# Patient Record
Sex: MALE | Race: WHITE | Employment: OTHER | URBAN - METROPOLITAN AREA
[De-identification: names, ages, dates, MRNs, and addresses within clinical notes are randomized per-mention and may not be internally consistent; named-entity substitution may affect disease eponyms.]

---

## 2021-01-01 ENCOUNTER — APPOINTMENT (OUTPATIENT)
Dept: GENERAL RADIOLOGY | Age: 76
DRG: 870 | End: 2021-01-01
Payer: MEDICARE

## 2021-01-01 ENCOUNTER — HOSPITAL ENCOUNTER (INPATIENT)
Age: 76
LOS: 18 days | DRG: 870 | End: 2021-01-26
Attending: EMERGENCY MEDICINE | Admitting: INTERNAL MEDICINE
Payer: MEDICARE

## 2021-01-01 ENCOUNTER — ANESTHESIA (OUTPATIENT)
Dept: INPATIENT UNIT | Age: 76
DRG: 870 | End: 2021-01-01
Payer: MEDICARE

## 2021-01-01 ENCOUNTER — APPOINTMENT (OUTPATIENT)
Dept: ULTRASOUND IMAGING | Age: 76
DRG: 870 | End: 2021-01-01
Payer: MEDICARE

## 2021-01-01 ENCOUNTER — APPOINTMENT (OUTPATIENT)
Dept: INTERVENTIONAL RADIOLOGY/VASCULAR | Age: 76
DRG: 870 | End: 2021-01-01
Payer: MEDICARE

## 2021-01-01 ENCOUNTER — APPOINTMENT (OUTPATIENT)
Dept: CT IMAGING | Age: 76
DRG: 870 | End: 2021-01-01
Payer: MEDICARE

## 2021-01-01 ENCOUNTER — ANESTHESIA EVENT (OUTPATIENT)
Dept: INPATIENT UNIT | Age: 76
DRG: 870 | End: 2021-01-01
Payer: MEDICARE

## 2021-01-01 VITALS
TEMPERATURE: 98.2 F | RESPIRATION RATE: 24 BRPM | HEIGHT: 72 IN | WEIGHT: 286.6 LBS | BODY MASS INDEX: 38.82 KG/M2 | OXYGEN SATURATION: 96 % | HEART RATE: 123 BPM | DIASTOLIC BLOOD PRESSURE: 47 MMHG | SYSTOLIC BLOOD PRESSURE: 75 MMHG

## 2021-01-01 DIAGNOSIS — U07.1 COVID-19: ICD-10-CM

## 2021-01-01 DIAGNOSIS — Z79.4 TYPE 2 DIABETES MELLITUS WITH HYPERGLYCEMIA, WITH LONG-TERM CURRENT USE OF INSULIN (HCC): ICD-10-CM

## 2021-01-01 DIAGNOSIS — E11.65 TYPE 2 DIABETES MELLITUS WITH HYPERGLYCEMIA, WITH LONG-TERM CURRENT USE OF INSULIN (HCC): ICD-10-CM

## 2021-01-01 DIAGNOSIS — N17.9 AKI (ACUTE KIDNEY INJURY) (HCC): ICD-10-CM

## 2021-01-01 DIAGNOSIS — R06.00 DYSPNEA, UNSPECIFIED TYPE: Primary | ICD-10-CM

## 2021-01-01 DIAGNOSIS — J96.01 ACUTE RESPIRATORY FAILURE WITH HYPOXIA (HCC): ICD-10-CM

## 2021-01-01 DIAGNOSIS — R79.89 ELEVATED D-DIMER: ICD-10-CM

## 2021-01-01 LAB
1,3 BETA-D-GLUCAN INTERP: NEGATIVE
1,3 BETA-D-GLUCAN: <31 PG/ML
ABO/RH: NORMAL
ALBUMIN SERPL-MCNC: 1.8 GM/DL (ref 3.4–5)
ALBUMIN SERPL-MCNC: 1.9 GM/DL (ref 3.4–5)
ALBUMIN SERPL-MCNC: 2.1 GM/DL (ref 3.4–5)
ALBUMIN SERPL-MCNC: 2.1 GM/DL (ref 3.4–5)
ALBUMIN SERPL-MCNC: 2.3 GM/DL
ALBUMIN SERPL-MCNC: 2.3 GM/DL (ref 3.4–5)
ALBUMIN SERPL-MCNC: 2.3 GM/DL (ref 3.4–5)
ALBUMIN SERPL-MCNC: 2.4 GM/DL (ref 3.4–5)
ALBUMIN SERPL-MCNC: 2.4 GM/DL (ref 3.4–5)
ALBUMIN SERPL-MCNC: 2.5 GM/DL (ref 3.4–5)
ALBUMIN SERPL-MCNC: 2.5 GM/DL (ref 3.4–5)
ALBUMIN SERPL-MCNC: 2.6 GM/DL (ref 3.4–5)
ALBUMIN SERPL-MCNC: 2.8 GM/DL (ref 3.4–5)
ALBUMIN SERPL-MCNC: 3.2 GM/DL (ref 3.4–5)
ALBUMIN SERPL-MCNC: 3.3 GM/DL (ref 3.4–5)
ALBUMIN SERPL-MCNC: 3.3 GM/DL (ref 3.4–5)
ALBUMIN SERPL-MCNC: 3.4 GM/DL (ref 3.4–5)
ALP BLD-CCNC: 25 IU/L (ref 40–128)
ALP BLD-CCNC: 25 IU/L (ref 40–129)
ALP BLD-CCNC: 31 IU/L (ref 40–128)
ALP BLD-CCNC: 33 IU/L (ref 40–128)
ALP BLD-CCNC: 34 IU/L (ref 40–128)
ALP BLD-CCNC: 37 IU/L (ref 40–129)
ALP BLD-CCNC: 41 IU/L (ref 40–128)
ALP BLD-CCNC: 42 IU/L (ref 40–129)
ALP BLD-CCNC: 44 IU/L (ref 40–128)
ALP BLD-CCNC: 44 IU/L (ref 40–129)
ALP BLD-CCNC: 46 IU/L (ref 40–129)
ALP BLD-CCNC: 49 IU/L
ALP BLD-CCNC: 50 IU/L (ref 40–128)
ALP BLD-CCNC: 50 IU/L (ref 40–129)
ALP BLD-CCNC: 55 IU/L (ref 40–128)
ALP BLD-CCNC: 55 IU/L (ref 40–128)
ALT SERPL-CCNC: 13 U/L (ref 10–40)
ALT SERPL-CCNC: 13 U/L (ref 10–40)
ALT SERPL-CCNC: 14 U/L (ref 10–40)
ALT SERPL-CCNC: 15 U/L (ref 10–40)
ALT SERPL-CCNC: 15 U/L (ref 10–40)
ALT SERPL-CCNC: 18 U/L (ref 10–40)
ALT SERPL-CCNC: 18 U/L (ref 10–40)
ALT SERPL-CCNC: 19 U/L (ref 10–40)
ALT SERPL-CCNC: 23 U/L (ref 10–40)
ALT SERPL-CCNC: 24 U/L (ref 10–40)
ALT SERPL-CCNC: 29 U/L (ref 10–40)
ALT SERPL-CCNC: 30 U/L (ref 10–40)
ALT SERPL-CCNC: 30 U/L (ref 10–40)
ALT SERPL-CCNC: 31 U/L (ref 10–40)
ALT SERPL-CCNC: 897 U/L (ref 10–40)
ANION GAP SERPL CALCULATED.3IONS-SCNC: 10 MMOL/L (ref 4–16)
ANION GAP SERPL CALCULATED.3IONS-SCNC: 12 MMOL/L (ref 4–16)
ANION GAP SERPL CALCULATED.3IONS-SCNC: 14 MMOL/L (ref 4–16)
ANION GAP SERPL CALCULATED.3IONS-SCNC: 14 MMOL/L (ref 4–16)
ANION GAP SERPL CALCULATED.3IONS-SCNC: 15 MMOL/L (ref 4–16)
ANION GAP SERPL CALCULATED.3IONS-SCNC: 16 MMOL/L (ref 4–16)
ANION GAP SERPL CALCULATED.3IONS-SCNC: 17 MMOL/L (ref 4–16)
ANION GAP SERPL CALCULATED.3IONS-SCNC: 18 MMOL/L (ref 4–16)
ANION GAP SERPL CALCULATED.3IONS-SCNC: 21 MMOL/L (ref 4–16)
ANION GAP SERPL CALCULATED.3IONS-SCNC: 22 MMOL/L (ref 4–16)
ANION GAP SERPL CALCULATED.3IONS-SCNC: 9 MMOL/L (ref 4–16)
ANION GAP SERPL CALCULATED.3IONS-SCNC: 9 MMOL/L (ref 4–16)
ANTIBODY SCREEN: NEGATIVE
APTT: 122.6 SECONDS (ref 25.1–37.1)
APTT: 40.5 SECONDS (ref 25.1–37.1)
APTT: 47.4 SECONDS (ref 25.1–37.1)
APTT: 99.5 SECONDS (ref 25.1–37.1)
ASPERGILLUS GALACTO AG: NEGATIVE
ASPERGILLUS GALACTO INDEX: 0.04
AST SERPL-CCNC: 1882 IU/L (ref 15–37)
AST SERPL-CCNC: 25 IU/L (ref 15–37)
AST SERPL-CCNC: 26 IU/L (ref 15–37)
AST SERPL-CCNC: 27 IU/L (ref 15–37)
AST SERPL-CCNC: 28 IU/L (ref 15–37)
AST SERPL-CCNC: 29 IU/L (ref 15–37)
AST SERPL-CCNC: 30 IU/L (ref 15–37)
AST SERPL-CCNC: 31 IU/L (ref 15–37)
AST SERPL-CCNC: 32 IU/L (ref 15–37)
AST SERPL-CCNC: 34 IU/L (ref 15–37)
AST SERPL-CCNC: 37 IU/L (ref 15–37)
AST SERPL-CCNC: 44 IU/L (ref 15–37)
AST SERPL-CCNC: 51 IU/L (ref 15–37)
AST SERPL-CCNC: 53 IU/L (ref 15–37)
BACTERIA: ABNORMAL /HPF
BACTERIA: ABNORMAL /HPF
BACTERIA: NEGATIVE /HPF
BASE EXCESS MIXED: 1 (ref 0–1.2)
BASE EXCESS MIXED: 1 (ref 0–1.2)
BASE EXCESS MIXED: 3.4 (ref 0–1.2)
BASE EXCESS MIXED: 3.7 (ref 0–1.2)
BASE EXCESS MIXED: 5 (ref 0–1.2)
BASE EXCESS MIXED: 5.8 (ref 0–1.2)
BASE EXCESS MIXED: 7.2 (ref 0–1.2)
BASE EXCESS: 0 (ref 0–3.3)
BASE EXCESS: 1 (ref 0–3.3)
BASE EXCESS: 1 (ref 0–3.3)
BASE EXCESS: 11 (ref 0–3.3)
BASE EXCESS: 2 (ref 0–3.3)
BASE EXCESS: 3 (ref 0–3.3)
BASE EXCESS: 4 (ref 0–3.3)
BASE EXCESS: 4 (ref 0–3.3)
BASE EXCESS: 5 (ref 0–3.3)
BASE EXCESS: 7 (ref 0–3.3)
BASE EXCESS: 7 (ref 0–3.3)
BASE EXCESS: 8 (ref 0–3.3)
BASE EXCESS: ABNORMAL (ref 0–3.3)
BASOPHILS ABSOLUTE: 0 K/CU MM
BASOPHILS RELATIVE PERCENT: 0.3 % (ref 0–1)
BILIRUB SERPL-MCNC: 0.5 MG/DL (ref 0–1)
BILIRUB SERPL-MCNC: 0.6 MG/DL (ref 0–1)
BILIRUB SERPL-MCNC: 0.7 MG/DL (ref 0–1)
BILIRUB SERPL-MCNC: 0.7 MG/DL (ref 0–1)
BILIRUB SERPL-MCNC: 0.8 MG/DL (ref 0–1)
BILIRUB SERPL-MCNC: 0.8 MG/DL (ref 0–1)
BILIRUB SERPL-MCNC: 0.9 MG/DL (ref 0–1)
BILIRUB SERPL-MCNC: 1 MG/DL (ref 0–1)
BILIRUB SERPL-MCNC: 1 MG/DL (ref 0–1)
BILIRUB SERPL-MCNC: 1.1 MG/DL (ref 0–1)
BILIRUB SERPL-MCNC: 1.2 MG/DL (ref 0–1)
BILIRUB SERPL-MCNC: 1.4 MG/DL (ref 0–1)
BILIRUB SERPL-MCNC: 1.5 MG/DL (ref 0–1)
BILIRUBIN URINE: NEGATIVE MG/DL
BLOOD, URINE: ABNORMAL
BLOOD, URINE: NEGATIVE
BLOOD, URINE: NEGATIVE
BUN BLDV-MCNC: 104 MG/DL (ref 6–23)
BUN BLDV-MCNC: 110 MG/DL (ref 6–23)
BUN BLDV-MCNC: 29 MG/DL (ref 6–23)
BUN BLDV-MCNC: 29 MG/DL (ref 6–23)
BUN BLDV-MCNC: 34 MG/DL (ref 6–23)
BUN BLDV-MCNC: 34 MG/DL (ref 6–23)
BUN BLDV-MCNC: 36 MG/DL (ref 6–23)
BUN BLDV-MCNC: 36 MG/DL (ref 6–23)
BUN BLDV-MCNC: 37 MG/DL (ref 6–23)
BUN BLDV-MCNC: 39 MG/DL (ref 6–23)
BUN BLDV-MCNC: 40 MG/DL (ref 6–23)
BUN BLDV-MCNC: 40 MG/DL (ref 6–23)
BUN BLDV-MCNC: 41 MG/DL (ref 6–23)
BUN BLDV-MCNC: 45 MG/DL (ref 6–23)
BUN BLDV-MCNC: 46 MG/DL (ref 6–23)
BUN BLDV-MCNC: 56 MG/DL (ref 6–23)
BUN BLDV-MCNC: 69 MG/DL (ref 6–23)
BUN BLDV-MCNC: 74 MG/DL (ref 6–23)
BUN BLDV-MCNC: 77 MG/DL (ref 6–23)
BUN BLDV-MCNC: 82 MG/DL (ref 6–23)
BUN BLDV-MCNC: 86 MG/DL (ref 6–23)
BUN BLDV-MCNC: 91 MG/DL (ref 6–23)
C-REACTIVE PROTEIN, HIGH SENSITIVITY: 187 MG/L
C-REACTIVE PROTEIN, HIGH SENSITIVITY: 188.7 MG/L
C-REACTIVE PROTEIN, HIGH SENSITIVITY: 348.7 MG/L
C-REACTIVE PROTEIN, HIGH SENSITIVITY: 391.6 MG/L
CALCIUM IONIZED: 2.16 MG/DL (ref 4.48–5.28)
CALCIUM IONIZED: 2.44 MG/DL (ref 4.48–5.28)
CALCIUM IONIZED: 2.68 MG/DL (ref 4.48–5.28)
CALCIUM IONIZED: 2.72 MG/DL (ref 4.48–5.28)
CALCIUM IONIZED: 2.88 MG/DL (ref 4.48–5.28)
CALCIUM IONIZED: 2.92 MG/DL (ref 4.48–5.28)
CALCIUM IONIZED: 3.32 MG/DL (ref 4.48–5.28)
CALCIUM IONIZED: 3.36 MG/DL (ref 4.48–5.28)
CALCIUM IONIZED: 3.4 MG/DL (ref 4.48–5.28)
CALCIUM IONIZED: 3.68 MG/DL (ref 4.48–5.28)
CALCIUM IONIZED: 3.76 MG/DL (ref 4.48–5.28)
CALCIUM IONIZED: 3.8 MG/DL (ref 4.48–5.28)
CALCIUM IONIZED: 3.8 MG/DL (ref 4.48–5.28)
CALCIUM IONIZED: 3.96 MG/DL (ref 4.48–5.28)
CALCIUM IONIZED: 4.04 MG/DL (ref 4.48–5.28)
CALCIUM IONIZED: 4.16 MG/DL (ref 4.48–5.28)
CALCIUM IONIZED: 4.24 MG/DL (ref 4.48–5.28)
CALCIUM IONIZED: 4.36 MG/DL (ref 4.48–5.28)
CALCIUM IONIZED: 4.44 MG/DL (ref 4.48–5.28)
CALCIUM IONIZED: 4.44 MG/DL (ref 4.48–5.28)
CALCIUM IONIZED: 4.64 MG/DL (ref 4.48–5.28)
CALCIUM OXALATE CRYSTALS: ABNORMAL /HPF
CALCIUM SERPL-MCNC: 4.7 MG/DL (ref 8.3–10.6)
CALCIUM SERPL-MCNC: 6.6 MG/DL (ref 8.3–10.6)
CALCIUM SERPL-MCNC: 7.3 MG/DL (ref 8.3–10.6)
CALCIUM SERPL-MCNC: 7.3 MG/DL (ref 8.3–10.6)
CALCIUM SERPL-MCNC: 7.4 MG/DL (ref 8.3–10.6)
CALCIUM SERPL-MCNC: 7.5 MG/DL (ref 8.3–10.6)
CALCIUM SERPL-MCNC: 7.6 MG/DL (ref 8.3–10.6)
CALCIUM SERPL-MCNC: 7.7 MG/DL (ref 8.3–10.6)
CALCIUM SERPL-MCNC: 7.8 MG/DL (ref 8.3–10.6)
CALCIUM SERPL-MCNC: 8 MG/DL (ref 8.3–10.6)
CALCIUM SERPL-MCNC: 8.1 MG/DL (ref 8.3–10.6)
CALCIUM SERPL-MCNC: 8.1 MG/DL (ref 8.3–10.6)
CALCIUM SERPL-MCNC: 8.2 MG/DL (ref 8.3–10.6)
CALCIUM SERPL-MCNC: 8.4 MG/DL (ref 8.3–10.6)
CALCIUM SERPL-MCNC: 8.8 MG/DL (ref 8.3–10.6)
CALCIUM SERPL-MCNC: 8.9 MG/DL (ref 8.3–10.6)
CARBON MONOXIDE, BLOOD: 1.4 % (ref 0–5)
CARBON MONOXIDE, BLOOD: 1.5 % (ref 0–5)
CARBON MONOXIDE, BLOOD: 1.6 % (ref 0–5)
CARBON MONOXIDE, BLOOD: 1.7 % (ref 0–5)
CARBON MONOXIDE, BLOOD: 1.8 % (ref 0–5)
CARBON MONOXIDE, BLOOD: 1.9 % (ref 0–5)
CARBON MONOXIDE, BLOOD: 1.9 % (ref 0–5)
CARBON MONOXIDE, BLOOD: 2 % (ref 0–5)
CARBON MONOXIDE, BLOOD: 2.1 % (ref 0–5)
CARBON MONOXIDE, BLOOD: 2.2 % (ref 0–5)
CARBON MONOXIDE, BLOOD: 2.2 % (ref 0–5)
CAST TYPE: ABNORMAL /HPF
CAST TYPE: ABNORMAL /HPF
CHLORIDE BLD-SCNC: 100 MMOL/L (ref 99–110)
CHLORIDE BLD-SCNC: 100 MMOL/L (ref 99–110)
CHLORIDE BLD-SCNC: 101 MMOL/L (ref 99–110)
CHLORIDE BLD-SCNC: 102 MMOL/L (ref 99–110)
CHLORIDE BLD-SCNC: 79 MMOL/L (ref 99–110)
CHLORIDE BLD-SCNC: 80 MMOL/L (ref 99–110)
CHLORIDE BLD-SCNC: 81 MMOL/L (ref 99–110)
CHLORIDE BLD-SCNC: 86 MMOL/L (ref 99–110)
CHLORIDE BLD-SCNC: 87 MMOL/L (ref 99–110)
CHLORIDE BLD-SCNC: 87 MMOL/L (ref 99–110)
CHLORIDE BLD-SCNC: 88 MMOL/L (ref 99–110)
CHLORIDE BLD-SCNC: 88 MMOL/L (ref 99–110)
CHLORIDE BLD-SCNC: 90 MMOL/L (ref 99–110)
CHLORIDE BLD-SCNC: 91 MMOL/L (ref 99–110)
CHLORIDE BLD-SCNC: 94 MMOL/L (ref 99–110)
CHLORIDE BLD-SCNC: 95 MMOL/L (ref 99–110)
CHLORIDE BLD-SCNC: 96 MMOL/L (ref 99–110)
CHLORIDE BLD-SCNC: 97 MMOL/L (ref 99–110)
CHLORIDE BLD-SCNC: 98 MMOL/L (ref 99–110)
CHLORIDE BLD-SCNC: 99 MMOL/L (ref 99–110)
CHP ED QC CHECK: YES
CHP ED QC CHECK: YES
CLARITY: ABNORMAL
CLARITY: ABNORMAL
CLARITY: CLEAR
CO2 CONTENT: 19.5 MMOL/L (ref 19–24)
CO2 CONTENT: 21 MMOL/L (ref 19–24)
CO2 CONTENT: 21.8 MMOL/L (ref 19–24)
CO2 CONTENT: 22 MMOL/L (ref 19–24)
CO2 CONTENT: 22.6 MMOL/L (ref 19–24)
CO2 CONTENT: 23.5 MMOL/L (ref 19–24)
CO2 CONTENT: 24 MMOL/L (ref 19–24)
CO2 CONTENT: 25.1 MMOL/L (ref 19–24)
CO2 CONTENT: 25.6 MMOL/L (ref 19–24)
CO2 CONTENT: 25.7 MMOL/L (ref 19–24)
CO2 CONTENT: 26.4 MMOL/L (ref 19–24)
CO2 CONTENT: 26.8 MMOL/L (ref 19–24)
CO2 CONTENT: 27.4 MMOL/L (ref 19–24)
CO2 CONTENT: 28.9 MMOL/L (ref 19–24)
CO2 CONTENT: 29.7 MMOL/L (ref 19–24)
CO2 CONTENT: 29.8 MMOL/L (ref 19–24)
CO2 CONTENT: 30.2 MMOL/L (ref 19–24)
CO2 CONTENT: 30.3 MMOL/L (ref 19–24)
CO2 CONTENT: 31.1 MMOL/L (ref 19–24)
CO2 CONTENT: 32.5 MMOL/L (ref 19–24)
CO2 CONTENT: 32.6 MMOL/L (ref 19–24)
CO2: 17 MMOL/L (ref 21–32)
CO2: 18 MMOL/L (ref 21–32)
CO2: 18 MMOL/L (ref 21–32)
CO2: 19 MMOL/L (ref 21–32)
CO2: 20 MMOL/L (ref 21–32)
CO2: 20 MMOL/L (ref 21–32)
CO2: 21 MMOL/L (ref 21–32)
CO2: 21 MMOL/L (ref 21–32)
CO2: 22 MMOL/L (ref 21–32)
CO2: 22 MMOL/L (ref 21–32)
CO2: 23 MMOL/L (ref 21–32)
CO2: 24 MMOL/L (ref 21–32)
CO2: 24 MMOL/L (ref 21–32)
CO2: 25 MMOL/L (ref 21–32)
CO2: 27 MMOL/L (ref 21–32)
CO2: 28 MMOL/L (ref 21–32)
COLOR: ABNORMAL
COLOR: YELLOW
COLOR: YELLOW
COMMENT: ABNORMAL
COMPONENT: NORMAL
CREAT SERPL-MCNC: 1.1 MG/DL (ref 0.9–1.3)
CREAT SERPL-MCNC: 1.2 MG/DL (ref 0.9–1.3)
CREAT SERPL-MCNC: 1.3 MG/DL (ref 0.9–1.3)
CREAT SERPL-MCNC: 1.3 MG/DL (ref 0.9–1.3)
CREAT SERPL-MCNC: 1.4 MG/DL (ref 0.9–1.3)
CREAT SERPL-MCNC: 1.6 MG/DL (ref 0.9–1.3)
CREAT SERPL-MCNC: 1.7 MG/DL (ref 0.9–1.3)
CREAT SERPL-MCNC: 1.7 MG/DL (ref 0.9–1.3)
CREAT SERPL-MCNC: 1.8 MG/DL (ref 0.9–1.3)
CREAT SERPL-MCNC: 1.8 MG/DL (ref 0.9–1.3)
CREAT SERPL-MCNC: 2 MG/DL (ref 0.9–1.3)
CREAT SERPL-MCNC: 2.2 MG/DL (ref 0.9–1.3)
CREAT SERPL-MCNC: 2.4 MG/DL (ref 0.9–1.3)
CREAT SERPL-MCNC: 2.6 MG/DL (ref 0.9–1.3)
CREAT SERPL-MCNC: 2.7 MG/DL (ref 0.9–1.3)
CREAT SERPL-MCNC: 2.9 MG/DL (ref 0.9–1.3)
CREAT SERPL-MCNC: 2.9 MG/DL (ref 0.9–1.3)
CREAT SERPL-MCNC: 3.3 MG/DL (ref 0.9–1.3)
CREAT SERPL-MCNC: 3.4 MG/DL (ref 0.9–1.3)
CREAT SERPL-MCNC: 3.9 MG/DL (ref 0.9–1.3)
CREATININE URINE: 91 MG/DL (ref 39–259)
CRYSTAL TYPE: NEGATIVE /HPF
CRYSTAL TYPE: NEGATIVE /HPF
CULTURE: ABNORMAL
CULTURE: ABNORMAL
CULTURE: NORMAL
D DIMER: 1004 NG/ML(DDU)
D DIMER: 1009 NG/ML(DDU)
D DIMER: 1863 NG/ML(DDU)
D DIMER: 2.26 UG/ML (FEU)
D DIMER: 492 NG/ML(DDU)
D DIMER: 496 NG/ML(DDU)
D DIMER: 681 NG/ML(DDU)
D DIMER: 683 NG/ML(DDU)
D DIMER: 689 NG/ML(DDU)
D DIMER: 748 NG/ML(DDU)
D DIMER: 853 NG/ML(DDU)
D DIMER: 859 NG/ML(DDU)
D DIMER: 865 NG/ML(DDU)
D DIMER: 882 NG/ML(DDU)
D DIMER: 929 NG/ML(DDU)
D DIMER: 956 NG/ML(DDU)
D DIMER: 970 NG/ML(DDU)
D DIMER: >5250 NG/ML(DDU)
DIFFERENTIAL TYPE: ABNORMAL
DOSE AMOUNT: ABNORMAL
DOSE AMOUNT: NORMAL
DOSE TIME: ABNORMAL
DOSE TIME: NORMAL
EKG ATRIAL RATE: 20 BPM
EKG ATRIAL RATE: 394 BPM
EKG DIAGNOSIS: NORMAL
EKG DIAGNOSIS: NORMAL
EKG Q-T INTERVAL: 456 MS
EKG Q-T INTERVAL: 514 MS
EKG QRS DURATION: 80 MS
EKG QRS DURATION: 84 MS
EKG QTC CALCULATION (BAZETT): 454 MS
EKG QTC CALCULATION (BAZETT): 488 MS
EKG R AXIS: -1 DEGREES
EKG R AXIS: -6 DEGREES
EKG T AXIS: 18 DEGREES
EKG T AXIS: 30 DEGREES
EKG VENTRICULAR RATE: 47 BPM
EKG VENTRICULAR RATE: 69 BPM
EOSINOPHILS ABSOLUTE: 0 K/CU MM
EOSINOPHILS RELATIVE PERCENT: 0 % (ref 0–3)
EPITHELIAL CELLS, UA: 1 /HPF
EPITHELIAL CELLS, UA: 3 /HPF
FERRITIN: 2370 NG/ML (ref 30–400)
FIBRINOGEN LEVEL: 282 MG/DL (ref 196.9–442.1)
FIBRINOGEN LEVEL: 312 MG/DL (ref 196.9–442.1)
FIBRINOGEN LEVEL: 403 MG/DL (ref 196.9–442.1)
FIBRINOGEN LEVEL: 403 MG/DL (ref 196.9–442.1)
FIBRINOGEN LEVEL: 429 MG/DL (ref 196.9–442.1)
FIBRINOGEN LEVEL: 438 MG/DL (ref 196.9–442.1)
FIBRINOGEN LEVEL: 531 MG/DL (ref 196.9–442.1)
FIBRINOGEN LEVEL: 548 MG/DL (ref 196.9–442.1)
FIBRINOGEN LEVEL: 560 MG/DL (ref 196.9–442.1)
FIBRINOGEN LEVEL: 563 MG/DL (ref 196.9–442.1)
FIBRINOGEN LEVEL: 576 MG/DL (ref 196.9–442.1)
FIBRINOGEN LEVEL: 653 MG/DL (ref 196.9–442.1)
FIBRINOGEN LEVEL: 669 MG/DL (ref 196.9–442.1)
FIBRINOGEN LEVEL: 675 MG/DL (ref 196.9–442.1)
FIBRINOGEN LEVEL: 748 MG/DL (ref 196.9–442.1)
FIBRINOGEN LEVEL: 767 MG/DL (ref 196.9–442.1)
FIBRINOGEN LEVEL: 908 MG/DL (ref 196.9–442.1)
GFR AFRICAN AMERICAN: 18 ML/MIN/1.73M2
GFR AFRICAN AMERICAN: 21 ML/MIN/1.73M2
GFR AFRICAN AMERICAN: 22 ML/MIN/1.73M2
GFR AFRICAN AMERICAN: 26 ML/MIN/1.73M2
GFR AFRICAN AMERICAN: 26 ML/MIN/1.73M2
GFR AFRICAN AMERICAN: 28 ML/MIN/1.73M2
GFR AFRICAN AMERICAN: 29 ML/MIN/1.73M2
GFR AFRICAN AMERICAN: 32 ML/MIN/1.73M2
GFR AFRICAN AMERICAN: 35 ML/MIN/1.73M2
GFR AFRICAN AMERICAN: 40 ML/MIN/1.73M2
GFR AFRICAN AMERICAN: 45 ML/MIN/1.73M2
GFR AFRICAN AMERICAN: 45 ML/MIN/1.73M2
GFR AFRICAN AMERICAN: 48 ML/MIN/1.73M2
GFR AFRICAN AMERICAN: 48 ML/MIN/1.73M2
GFR AFRICAN AMERICAN: 51 ML/MIN/1.73M2
GFR AFRICAN AMERICAN: 53 ML/MIN/1.73M2
GFR AFRICAN AMERICAN: 60 ML/MIN/1.73M2
GFR AFRICAN AMERICAN: >60 ML/MIN/1.73M2
GFR NON-AFRICAN AMERICAN: 15 ML/MIN/1.73M2
GFR NON-AFRICAN AMERICAN: 18 ML/MIN/1.73M2
GFR NON-AFRICAN AMERICAN: 18 ML/MIN/1.73M2
GFR NON-AFRICAN AMERICAN: 21 ML/MIN/1.73M2
GFR NON-AFRICAN AMERICAN: 21 ML/MIN/1.73M2
GFR NON-AFRICAN AMERICAN: 23 ML/MIN/1.73M2
GFR NON-AFRICAN AMERICAN: 24 ML/MIN/1.73M2
GFR NON-AFRICAN AMERICAN: 26 ML/MIN/1.73M2
GFR NON-AFRICAN AMERICAN: 29 ML/MIN/1.73M2
GFR NON-AFRICAN AMERICAN: 33 ML/MIN/1.73M2
GFR NON-AFRICAN AMERICAN: 37 ML/MIN/1.73M2
GFR NON-AFRICAN AMERICAN: 37 ML/MIN/1.73M2
GFR NON-AFRICAN AMERICAN: 39 ML/MIN/1.73M2
GFR NON-AFRICAN AMERICAN: 39 ML/MIN/1.73M2
GFR NON-AFRICAN AMERICAN: 42 ML/MIN/1.73M2
GFR NON-AFRICAN AMERICAN: 44 ML/MIN/1.73M2
GFR NON-AFRICAN AMERICAN: 49 ML/MIN/1.73M2
GFR NON-AFRICAN AMERICAN: 54 ML/MIN/1.73M2
GFR NON-AFRICAN AMERICAN: 54 ML/MIN/1.73M2
GFR NON-AFRICAN AMERICAN: 59 ML/MIN/1.73M2
GFR NON-AFRICAN AMERICAN: >60 ML/MIN/1.73M2
GLUCOSE BLD-MCNC: 107 MG/DL (ref 70–99)
GLUCOSE BLD-MCNC: 115 MG/DL (ref 70–99)
GLUCOSE BLD-MCNC: 117 MG/DL (ref 70–99)
GLUCOSE BLD-MCNC: 120 MG/DL (ref 70–99)
GLUCOSE BLD-MCNC: 124 MG/DL (ref 70–99)
GLUCOSE BLD-MCNC: 128 MG/DL (ref 70–99)
GLUCOSE BLD-MCNC: 138 MG/DL (ref 70–99)
GLUCOSE BLD-MCNC: 138 MG/DL (ref 70–99)
GLUCOSE BLD-MCNC: 139 MG/DL (ref 70–99)
GLUCOSE BLD-MCNC: 141 MG/DL (ref 70–99)
GLUCOSE BLD-MCNC: 143 MG/DL (ref 70–99)
GLUCOSE BLD-MCNC: 144 MG/DL (ref 70–99)
GLUCOSE BLD-MCNC: 145 MG/DL (ref 70–99)
GLUCOSE BLD-MCNC: 147 MG/DL (ref 70–99)
GLUCOSE BLD-MCNC: 149 MG/DL (ref 70–99)
GLUCOSE BLD-MCNC: 150 MG/DL (ref 70–99)
GLUCOSE BLD-MCNC: 151 MG/DL (ref 70–99)
GLUCOSE BLD-MCNC: 151 MG/DL (ref 70–99)
GLUCOSE BLD-MCNC: 152 MG/DL (ref 70–99)
GLUCOSE BLD-MCNC: 157 MG/DL (ref 70–99)
GLUCOSE BLD-MCNC: 158 MG/DL (ref 70–99)
GLUCOSE BLD-MCNC: 159 MG/DL (ref 70–99)
GLUCOSE BLD-MCNC: 160 MG/DL (ref 70–99)
GLUCOSE BLD-MCNC: 160 MG/DL (ref 70–99)
GLUCOSE BLD-MCNC: 161 MG/DL (ref 70–99)
GLUCOSE BLD-MCNC: 162 MG/DL (ref 70–99)
GLUCOSE BLD-MCNC: 162 MG/DL (ref 70–99)
GLUCOSE BLD-MCNC: 163 MG/DL (ref 70–99)
GLUCOSE BLD-MCNC: 164 MG/DL (ref 70–99)
GLUCOSE BLD-MCNC: 165 MG/DL (ref 70–99)
GLUCOSE BLD-MCNC: 166 MG/DL (ref 70–99)
GLUCOSE BLD-MCNC: 167 MG/DL (ref 70–99)
GLUCOSE BLD-MCNC: 167 MG/DL (ref 70–99)
GLUCOSE BLD-MCNC: 168 MG/DL (ref 70–99)
GLUCOSE BLD-MCNC: 168 MG/DL (ref 70–99)
GLUCOSE BLD-MCNC: 171 MG/DL (ref 70–99)
GLUCOSE BLD-MCNC: 174 MG/DL (ref 70–99)
GLUCOSE BLD-MCNC: 177 MG/DL (ref 70–99)
GLUCOSE BLD-MCNC: 179 MG/DL (ref 70–99)
GLUCOSE BLD-MCNC: 179 MG/DL (ref 70–99)
GLUCOSE BLD-MCNC: 180 MG/DL (ref 70–99)
GLUCOSE BLD-MCNC: 180 MG/DL (ref 70–99)
GLUCOSE BLD-MCNC: 183 MG/DL (ref 70–99)
GLUCOSE BLD-MCNC: 185 MG/DL (ref 70–99)
GLUCOSE BLD-MCNC: 186 MG/DL (ref 70–99)
GLUCOSE BLD-MCNC: 186 MG/DL (ref 70–99)
GLUCOSE BLD-MCNC: 187 MG/DL (ref 70–99)
GLUCOSE BLD-MCNC: 187 MG/DL (ref 70–99)
GLUCOSE BLD-MCNC: 188 MG/DL (ref 70–99)
GLUCOSE BLD-MCNC: 189 MG/DL (ref 70–99)
GLUCOSE BLD-MCNC: 190 MG/DL (ref 70–99)
GLUCOSE BLD-MCNC: 190 MG/DL (ref 70–99)
GLUCOSE BLD-MCNC: 191 MG/DL (ref 70–99)
GLUCOSE BLD-MCNC: 194 MG/DL (ref 70–99)
GLUCOSE BLD-MCNC: 195 MG/DL (ref 70–99)
GLUCOSE BLD-MCNC: 196 MG/DL (ref 70–99)
GLUCOSE BLD-MCNC: 197 MG/DL (ref 70–99)
GLUCOSE BLD-MCNC: 198 MG/DL (ref 70–99)
GLUCOSE BLD-MCNC: 199 MG/DL (ref 70–99)
GLUCOSE BLD-MCNC: 202 MG/DL (ref 70–99)
GLUCOSE BLD-MCNC: 206 MG/DL (ref 70–99)
GLUCOSE BLD-MCNC: 206 MG/DL (ref 70–99)
GLUCOSE BLD-MCNC: 212 MG/DL (ref 70–99)
GLUCOSE BLD-MCNC: 213 MG/DL (ref 70–99)
GLUCOSE BLD-MCNC: 216 MG/DL (ref 70–99)
GLUCOSE BLD-MCNC: 219 MG/DL (ref 70–99)
GLUCOSE BLD-MCNC: 219 MG/DL (ref 70–99)
GLUCOSE BLD-MCNC: 220 MG/DL (ref 70–99)
GLUCOSE BLD-MCNC: 222 MG/DL (ref 70–99)
GLUCOSE BLD-MCNC: 222 MG/DL (ref 70–99)
GLUCOSE BLD-MCNC: 223 MG/DL (ref 70–99)
GLUCOSE BLD-MCNC: 230 MG/DL (ref 70–99)
GLUCOSE BLD-MCNC: 231 MG/DL (ref 70–99)
GLUCOSE BLD-MCNC: 231 MG/DL (ref 70–99)
GLUCOSE BLD-MCNC: 234 MG/DL (ref 70–99)
GLUCOSE BLD-MCNC: 236 MG/DL (ref 70–99)
GLUCOSE BLD-MCNC: 236 MG/DL (ref 70–99)
GLUCOSE BLD-MCNC: 238 MG/DL (ref 70–99)
GLUCOSE BLD-MCNC: 242 MG/DL (ref 70–99)
GLUCOSE BLD-MCNC: 244 MG/DL (ref 70–99)
GLUCOSE BLD-MCNC: 245 MG/DL (ref 70–99)
GLUCOSE BLD-MCNC: 250 MG/DL (ref 70–99)
GLUCOSE BLD-MCNC: 252 MG/DL (ref 70–99)
GLUCOSE BLD-MCNC: 252 MG/DL (ref 70–99)
GLUCOSE BLD-MCNC: 256 MG/DL (ref 70–99)
GLUCOSE BLD-MCNC: 269 MG/DL (ref 70–99)
GLUCOSE BLD-MCNC: 270 MG/DL (ref 70–99)
GLUCOSE BLD-MCNC: 273 MG/DL (ref 70–99)
GLUCOSE BLD-MCNC: 273 MG/DL (ref 70–99)
GLUCOSE BLD-MCNC: 274 MG/DL (ref 70–99)
GLUCOSE BLD-MCNC: 274 MG/DL (ref 70–99)
GLUCOSE BLD-MCNC: 275 MG/DL
GLUCOSE BLD-MCNC: 275 MG/DL (ref 70–99)
GLUCOSE BLD-MCNC: 278 MG/DL (ref 70–99)
GLUCOSE BLD-MCNC: 279 MG/DL
GLUCOSE BLD-MCNC: 279 MG/DL (ref 70–99)
GLUCOSE BLD-MCNC: 279 MG/DL (ref 70–99)
GLUCOSE BLD-MCNC: 284 MG/DL
GLUCOSE BLD-MCNC: 284 MG/DL (ref 70–99)
GLUCOSE BLD-MCNC: 286 MG/DL (ref 70–99)
GLUCOSE BLD-MCNC: 288 MG/DL (ref 70–99)
GLUCOSE BLD-MCNC: 293 MG/DL (ref 70–99)
GLUCOSE BLD-MCNC: 296 MG/DL (ref 70–99)
GLUCOSE BLD-MCNC: 300 MG/DL (ref 70–99)
GLUCOSE BLD-MCNC: 307 MG/DL (ref 70–99)
GLUCOSE BLD-MCNC: 315 MG/DL (ref 70–99)
GLUCOSE BLD-MCNC: 322 MG/DL (ref 70–99)
GLUCOSE BLD-MCNC: 322 MG/DL (ref 70–99)
GLUCOSE BLD-MCNC: 328 MG/DL (ref 70–99)
GLUCOSE BLD-MCNC: 334 MG/DL (ref 70–99)
GLUCOSE BLD-MCNC: 335 MG/DL (ref 70–99)
GLUCOSE BLD-MCNC: 350 MG/DL (ref 70–99)
GLUCOSE BLD-MCNC: 367 MG/DL (ref 70–99)
GLUCOSE BLD-MCNC: 367 MG/DL (ref 70–99)
GLUCOSE BLD-MCNC: 37 MG/DL (ref 70–99)
GLUCOSE BLD-MCNC: 370 MG/DL (ref 70–99)
GLUCOSE BLD-MCNC: 384 MG/DL (ref 70–99)
GLUCOSE BLD-MCNC: 391 MG/DL (ref 70–99)
GLUCOSE BLD-MCNC: 394 MG/DL (ref 70–99)
GLUCOSE BLD-MCNC: 402 MG/DL (ref 70–99)
GLUCOSE BLD-MCNC: 403 MG/DL (ref 70–99)
GLUCOSE BLD-MCNC: 473 MG/DL (ref 70–99)
GLUCOSE BLD-MCNC: 48 MG/DL (ref 70–99)
GLUCOSE BLD-MCNC: 538 MG/DL (ref 70–99)
GLUCOSE BLD-MCNC: 58 MG/DL (ref 70–99)
GLUCOSE, URINE: NEGATIVE MG/DL
GRAM SMEAR: ABNORMAL
GRAM SMEAR: NORMAL
GRANULAR CASTS: 4 /LPF
HCO3 ARTERIAL: 18.3 MMOL/L (ref 18–23)
HCO3 ARTERIAL: 19.7 MMOL/L (ref 18–23)
HCO3 ARTERIAL: 20.4 MMOL/L (ref 18–23)
HCO3 ARTERIAL: 20.6 MMOL/L (ref 18–23)
HCO3 ARTERIAL: 20.6 MMOL/L (ref 18–23)
HCO3 ARTERIAL: 22.1 MMOL/L (ref 18–23)
HCO3 ARTERIAL: 22.5 MMOL/L (ref 18–23)
HCO3 ARTERIAL: 23.1 MMOL/L (ref 18–23)
HCO3 ARTERIAL: 23.7 MMOL/L (ref 18–23)
HCO3 ARTERIAL: 23.9 MMOL/L (ref 18–23)
HCO3 ARTERIAL: 24 MMOL/L (ref 18–23)
HCO3 ARTERIAL: 25.1 MMOL/L (ref 18–23)
HCO3 ARTERIAL: 25.2 MMOL/L (ref 18–23)
HCO3 ARTERIAL: 25.4 MMOL/L (ref 18–23)
HCO3 ARTERIAL: 27.2 MMOL/L (ref 18–23)
HCO3 ARTERIAL: 27.4 MMOL/L (ref 18–23)
HCO3 ARTERIAL: 27.8 MMOL/L (ref 18–23)
HCO3 ARTERIAL: 28.5 MMOL/L (ref 18–23)
HCO3 ARTERIAL: 29.1 MMOL/L (ref 18–23)
HCO3 ARTERIAL: 29.6 MMOL/L (ref 18–23)
HCO3 ARTERIAL: 31.2 MMOL/L (ref 18–23)
HCO3 ARTERIAL: 31.2 MMOL/L (ref 18–23)
HCT VFR BLD CALC: 22.2 % (ref 42–52)
HCT VFR BLD CALC: 23.2 % (ref 42–52)
HCT VFR BLD CALC: 24 % (ref 42–52)
HCT VFR BLD CALC: 26.7 % (ref 42–52)
HCT VFR BLD CALC: 27.1 % (ref 42–52)
HCT VFR BLD CALC: 27.4 % (ref 42–52)
HCT VFR BLD CALC: 29.2 % (ref 42–52)
HCT VFR BLD CALC: 29.7 % (ref 42–52)
HCT VFR BLD CALC: 30.9 % (ref 42–52)
HCT VFR BLD CALC: 31.3 % (ref 42–52)
HCT VFR BLD CALC: 32.2 % (ref 42–52)
HCT VFR BLD CALC: 32.7 % (ref 42–52)
HCT VFR BLD CALC: 34 % (ref 42–52)
HCT VFR BLD CALC: 34.6 % (ref 42–52)
HCT VFR BLD CALC: 35.4 % (ref 42–52)
HCT VFR BLD CALC: 35.8 % (ref 42–52)
HCT VFR BLD CALC: 37 % (ref 42–52)
HCT VFR BLD CALC: 37.2 % (ref 42–52)
HCT VFR BLD CALC: 37.2 % (ref 42–52)
HEMOGLOBIN: 10.2 GM/DL (ref 13.5–18)
HEMOGLOBIN: 10.3 GM/DL (ref 13.5–18)
HEMOGLOBIN: 10.3 GM/DL (ref 13.5–18)
HEMOGLOBIN: 10.6 GM/DL (ref 13.5–18)
HEMOGLOBIN: 10.7 GM/DL (ref 13.5–18)
HEMOGLOBIN: 10.8 GM/DL (ref 13.5–18)
HEMOGLOBIN: 11.3 GM/DL (ref 13.5–18)
HEMOGLOBIN: 11.7 GM/DL (ref 13.5–18)
HEMOGLOBIN: 11.8 GM/DL (ref 13.5–18)
HEMOGLOBIN: 11.9 GM/DL (ref 13.5–18)
HEMOGLOBIN: 12 GM/DL (ref 13.5–18)
HEMOGLOBIN: 12.3 GM/DL (ref 13.5–18)
HEMOGLOBIN: 12.5 GM/DL (ref 13.5–18)
HEMOGLOBIN: 7 GM/DL (ref 13.5–18)
HEMOGLOBIN: 7.4 GM/DL (ref 13.5–18)
HEMOGLOBIN: 7.7 GM/DL (ref 13.5–18)
HEMOGLOBIN: 8.1 GM/DL (ref 13.5–18)
HEMOGLOBIN: 9 GM/DL (ref 13.5–18)
HEMOGLOBIN: 9 GM/DL (ref 13.5–18)
HIGH SENSITIVE C-REACTIVE PROTEIN: 104.6 MG/L
HIGH SENSITIVE C-REACTIVE PROTEIN: 148.7 MG/L
HIGH SENSITIVE C-REACTIVE PROTEIN: 211.8 MG/L
HIGH SENSITIVE C-REACTIVE PROTEIN: 83.7 MG/L
HIGH SENSITIVE C-REACTIVE PROTEIN: >300 MG/L
HIV SCREEN: NON REACTIVE
IMMATURE NEUTROPHIL %: 1.1 % (ref 0–0.43)
INR BLD: 1.02 INDEX
IONIZED CA: 0.54 MMOL/L (ref 1.12–1.32)
IONIZED CA: 0.61 MMOL/L (ref 1.12–1.32)
IONIZED CA: 0.67 MMOL/L (ref 1.12–1.32)
IONIZED CA: 0.68 MMOL/L (ref 1.12–1.32)
IONIZED CA: 0.72 MMOL/L (ref 1.12–1.32)
IONIZED CA: 0.73 MMOL/L (ref 1.12–1.32)
IONIZED CA: 0.83 MMOL/L (ref 1.12–1.32)
IONIZED CA: 0.84 MMOL/L (ref 1.12–1.32)
IONIZED CA: 0.85 MMOL/L (ref 1.12–1.32)
IONIZED CA: 0.92 MMOL/L (ref 1.12–1.32)
IONIZED CA: 0.94 MMOL/L (ref 1.12–1.32)
IONIZED CA: 0.95 MMOL/L (ref 1.12–1.32)
IONIZED CA: 0.95 MMOL/L (ref 1.12–1.32)
IONIZED CA: 0.99 MMOL/L (ref 1.12–1.32)
IONIZED CA: 1.01 MMOL/L (ref 1.12–1.32)
IONIZED CA: 1.04 MMOL/L (ref 1.12–1.32)
IONIZED CA: 1.06 MMOL/L (ref 1.12–1.32)
IONIZED CA: 1.09 MMOL/L (ref 1.12–1.32)
IONIZED CA: 1.11 MMOL/L (ref 1.12–1.32)
IONIZED CA: 1.11 MMOL/L (ref 1.12–1.32)
IONIZED CA: 1.16 MMOL/L (ref 1.12–1.32)
KETONES, URINE: 15 MG/DL
KETONES, URINE: 5 MG/DL
KETONES, URINE: ABNORMAL MG/DL
LACTATE: 1 MMOL/L (ref 0.4–2)
LACTATE: 1.5 MMOL/L (ref 0.4–2)
LACTATE: 2.7 MMOL/L (ref 0.4–2)
LACTIC ACID, SEPSIS: 1.3 MMOL/L (ref 0.5–1.9)
LEUKOCYTE ESTERASE, URINE: NEGATIVE
LYMPHOCYTES ABSOLUTE: 0.7 K/CU MM
LYMPHOCYTES RELATIVE PERCENT: 17.9 % (ref 24–44)
Lab: 2
Lab: ABNORMAL
Lab: ABNORMAL
Lab: NORMAL
MAGNESIUM: 2 MG/DL (ref 1.8–2.4)
MAGNESIUM: 2.1 MG/DL (ref 1.8–2.4)
MAGNESIUM: 2.2 MG/DL (ref 1.8–2.4)
MAGNESIUM: 2.2 MG/DL (ref 1.8–2.4)
MAGNESIUM: 2.3 MG/DL (ref 1.8–2.4)
MAGNESIUM: 2.4 MG/DL (ref 1.8–2.4)
MAGNESIUM: 2.5 MG/DL (ref 1.8–2.4)
MAGNESIUM: 2.6 MG/DL (ref 1.8–2.4)
MAGNESIUM: 2.7 MG/DL (ref 1.8–2.4)
MAGNESIUM: 2.9 MG/DL (ref 1.8–2.4)
MAGNESIUM: 2.9 MG/DL (ref 1.8–2.4)
MAGNESIUM: 3 MG/DL (ref 1.8–2.4)
MCH RBC QN AUTO: 30.8 PG (ref 27–31)
MCH RBC QN AUTO: 30.9 PG (ref 27–31)
MCH RBC QN AUTO: 31 PG (ref 27–31)
MCH RBC QN AUTO: 31.1 PG (ref 27–31)
MCH RBC QN AUTO: 31.3 PG (ref 27–31)
MCH RBC QN AUTO: 31.4 PG (ref 27–31)
MCH RBC QN AUTO: 31.5 PG (ref 27–31)
MCH RBC QN AUTO: 31.6 PG (ref 27–31)
MCH RBC QN AUTO: 31.7 PG (ref 27–31)
MCH RBC QN AUTO: 31.7 PG (ref 27–31)
MCH RBC QN AUTO: 31.8 PG (ref 27–31)
MCH RBC QN AUTO: 31.9 PG (ref 27–31)
MCH RBC QN AUTO: 31.9 PG (ref 27–31)
MCHC RBC AUTO-ENTMCNC: 29.6 % (ref 32–36)
MCHC RBC AUTO-ENTMCNC: 30.8 % (ref 32–36)
MCHC RBC AUTO-ENTMCNC: 31.5 % (ref 32–36)
MCHC RBC AUTO-ENTMCNC: 32.3 % (ref 32–36)
MCHC RBC AUTO-ENTMCNC: 32.7 % (ref 32–36)
MCHC RBC AUTO-ENTMCNC: 33 % (ref 32–36)
MCHC RBC AUTO-ENTMCNC: 33.2 % (ref 32–36)
MCHC RBC AUTO-ENTMCNC: 33.3 % (ref 32–36)
MCHC RBC AUTO-ENTMCNC: 33.3 % (ref 32–36)
MCHC RBC AUTO-ENTMCNC: 33.6 % (ref 32–36)
MCHC RBC AUTO-ENTMCNC: 33.7 % (ref 32–36)
MCHC RBC AUTO-ENTMCNC: 33.9 % (ref 32–36)
MCHC RBC AUTO-ENTMCNC: 34.7 % (ref 32–36)
MCHC RBC AUTO-ENTMCNC: 34.9 % (ref 32–36)
MCV RBC AUTO: 101.7 FL (ref 78–100)
MCV RBC AUTO: 104.6 FL (ref 78–100)
MCV RBC AUTO: 90.7 FL (ref 78–100)
MCV RBC AUTO: 91.1 FL (ref 78–100)
MCV RBC AUTO: 92.5 FL (ref 78–100)
MCV RBC AUTO: 93.1 FL (ref 78–100)
MCV RBC AUTO: 93.2 FL (ref 78–100)
MCV RBC AUTO: 93.5 FL (ref 78–100)
MCV RBC AUTO: 93.6 FL (ref 78–100)
MCV RBC AUTO: 94.1 FL (ref 78–100)
MCV RBC AUTO: 94.3 FL (ref 78–100)
MCV RBC AUTO: 94.6 FL (ref 78–100)
MCV RBC AUTO: 94.7 FL (ref 78–100)
MCV RBC AUTO: 95.3 FL (ref 78–100)
MCV RBC AUTO: 95.9 FL (ref 78–100)
MCV RBC AUTO: 95.9 FL (ref 78–100)
MCV RBC AUTO: 96.4 FL (ref 78–100)
MCV RBC AUTO: 97.8 FL (ref 78–100)
METHEMOGLOBIN ARTERIAL: 1 %
METHEMOGLOBIN ARTERIAL: 1 %
METHEMOGLOBIN ARTERIAL: 1.1 %
METHEMOGLOBIN ARTERIAL: 1.2 %
METHEMOGLOBIN ARTERIAL: 1.3 %
METHEMOGLOBIN ARTERIAL: 1.3 %
METHEMOGLOBIN ARTERIAL: 1.4 %
METHEMOGLOBIN ARTERIAL: 1.5 %
METHEMOGLOBIN ARTERIAL: 1.7 %
METHEMOGLOBIN ARTERIAL: 1.9 %
METHEMOGLOBIN ARTERIAL: 2 %
MONOCYTES ABSOLUTE: 0.3 K/CU MM
MONOCYTES RELATIVE PERCENT: 8.2 % (ref 0–4)
MUCUS: ABNORMAL HPF
NITRITE URINE, QUANTITATIVE: NEGATIVE
O2 SATURATION: 58.9 % (ref 96–97)
O2 SATURATION: 81 % (ref 96–97)
O2 SATURATION: 86.2 % (ref 96–97)
O2 SATURATION: 87.5 % (ref 96–97)
O2 SATURATION: 88.1 % (ref 96–97)
O2 SATURATION: 88.8 % (ref 96–97)
O2 SATURATION: 88.9 % (ref 96–97)
O2 SATURATION: 89.2 % (ref 96–97)
O2 SATURATION: 90.1 % (ref 96–97)
O2 SATURATION: 90.5 % (ref 96–97)
O2 SATURATION: 90.8 % (ref 96–97)
O2 SATURATION: 91.1 % (ref 96–97)
O2 SATURATION: 92.4 % (ref 96–97)
O2 SATURATION: 92.8 % (ref 96–97)
O2 SATURATION: 93.7 % (ref 96–97)
O2 SATURATION: 93.9 % (ref 96–97)
O2 SATURATION: 94.2 % (ref 96–97)
O2 SATURATION: 94.2 % (ref 96–97)
O2 SATURATION: 94.6 % (ref 96–97)
O2 SATURATION: 95.2 % (ref 96–97)
O2 SATURATION: 96.2 % (ref 96–97)
O2 SATURATION: 96.7 % (ref 96–97)
PCO2 ARTERIAL: 35.7 MMHG (ref 32–45)
PCO2 ARTERIAL: 38 MMHG (ref 32–45)
PCO2 ARTERIAL: 39 MMHG (ref 32–45)
PCO2 ARTERIAL: 39 MMHG (ref 32–45)
PCO2 ARTERIAL: 40 MMHG (ref 32–45)
PCO2 ARTERIAL: 41 MMHG (ref 32–45)
PCO2 ARTERIAL: 41 MMHG (ref 32–45)
PCO2 ARTERIAL: 43 MMHG (ref 32–45)
PCO2 ARTERIAL: 44 MMHG (ref 32–45)
PCO2 ARTERIAL: 46 MMHG (ref 32–45)
PCO2 ARTERIAL: 47 MMHG (ref 32–45)
PCO2 ARTERIAL: 47 MMHG (ref 32–45)
PCO2 ARTERIAL: 49 MMHG (ref 32–45)
PCO2 ARTERIAL: 50 MMHG (ref 32–45)
PCO2 ARTERIAL: 54 MMHG (ref 32–45)
PCO2 ARTERIAL: 56 MMHG (ref 32–45)
PCO2 ARTERIAL: 56 MMHG (ref 32–45)
PCO2 ARTERIAL: 57 MMHG (ref 32–45)
PCO2 ARTERIAL: 65 MMHG (ref 32–45)
PCO2 ARTERIAL: 72 MMHG (ref 32–45)
PCO2 ARTERIAL: 75 MMHG (ref 32–45)
PCO2 ARTERIAL: 78 MMHG (ref 32–45)
PDW BLD-RTO: 13.2 % (ref 11.7–14.9)
PDW BLD-RTO: 13.3 % (ref 11.7–14.9)
PDW BLD-RTO: 13.4 % (ref 11.7–14.9)
PDW BLD-RTO: 13.5 % (ref 11.7–14.9)
PDW BLD-RTO: 13.7 % (ref 11.7–14.9)
PDW BLD-RTO: 13.8 % (ref 11.7–14.9)
PDW BLD-RTO: 14.3 % (ref 11.7–14.9)
PDW BLD-RTO: 14.6 % (ref 11.7–14.9)
PDW BLD-RTO: 14.7 % (ref 11.7–14.9)
PDW BLD-RTO: 15.2 % (ref 11.7–14.9)
PDW BLD-RTO: 15.6 % (ref 11.7–14.9)
PDW BLD-RTO: 15.7 % (ref 11.7–14.9)
PH BLOOD: 7.06 (ref 7.34–7.45)
PH BLOOD: 7.16 (ref 7.34–7.45)
PH BLOOD: 7.17 (ref 7.34–7.45)
PH BLOOD: 7.2 (ref 7.34–7.45)
PH BLOOD: 7.23 (ref 7.34–7.45)
PH BLOOD: 7.24 (ref 7.34–7.45)
PH BLOOD: 7.25 (ref 7.34–7.45)
PH BLOOD: 7.26 (ref 7.34–7.45)
PH BLOOD: 7.27 (ref 7.34–7.45)
PH BLOOD: 7.28 (ref 7.34–7.45)
PH BLOOD: 7.29 (ref 7.34–7.45)
PH BLOOD: 7.29 (ref 7.34–7.45)
PH BLOOD: 7.31 (ref 7.34–7.45)
PH BLOOD: 7.33 (ref 7.34–7.45)
PH BLOOD: 7.34 (ref 7.34–7.45)
PH BLOOD: 7.35 (ref 7.34–7.45)
PH BLOOD: 7.35 (ref 7.34–7.45)
PH BLOOD: 7.38 (ref 7.34–7.45)
PH BLOOD: 7.42 (ref 7.34–7.45)
PH BLOOD: 7.43 (ref 7.34–7.45)
PH BLOOD: 7.47 (ref 7.34–7.45)
PH BLOOD: 7.49 (ref 7.34–7.45)
PH, URINE: 5 (ref 5–8)
PHOSPHORUS: 10.4 MG/DL (ref 2.5–4.9)
PHOSPHORUS: 3.1 MG/DL (ref 2.5–4.9)
PHOSPHORUS: 3.7 MG/DL (ref 2.5–4.9)
PHOSPHORUS: 3.8 MG/DL (ref 2.5–4.9)
PHOSPHORUS: 3.9 MG/DL (ref 2.5–4.9)
PHOSPHORUS: 4.5 MG/DL (ref 2.5–4.9)
PHOSPHORUS: 4.5 MG/DL (ref 2.5–4.9)
PHOSPHORUS: 4.8 MG/DL (ref 2.5–4.9)
PHOSPHORUS: 4.8 MG/DL (ref 2.5–4.9)
PHOSPHORUS: 6.1 MG/DL (ref 2.5–4.9)
PHOSPHORUS: 6.7 MG/DL (ref 2.5–4.9)
PHOSPHORUS: 8.9 MG/DL (ref 2.5–4.9)
PHOSPHORUS: 9 MG/DL (ref 2.5–4.9)
PLATELET # BLD: 118 K/CU MM (ref 140–440)
PLATELET # BLD: 161 K/CU MM (ref 140–440)
PLATELET # BLD: 180 K/CU MM (ref 140–440)
PLATELET # BLD: 202 K/CU MM (ref 140–440)
PLATELET # BLD: 211 K/CU MM (ref 140–440)
PLATELET # BLD: 221 K/CU MM (ref 140–440)
PLATELET # BLD: 228 K/CU MM (ref 140–440)
PLATELET # BLD: 245 K/CU MM (ref 140–440)
PLATELET # BLD: 251 K/CU MM (ref 140–440)
PLATELET # BLD: 254 K/CU MM (ref 140–440)
PLATELET # BLD: 262 K/CU MM (ref 140–440)
PLATELET # BLD: 271 K/CU MM (ref 140–440)
PLATELET # BLD: 282 K/CU MM (ref 140–440)
PLATELET # BLD: 286 K/CU MM (ref 140–440)
PLATELET # BLD: 288 K/CU MM (ref 140–440)
PLATELET # BLD: 291 K/CU MM (ref 140–440)
PLATELET # BLD: 310 K/CU MM (ref 140–440)
PLATELET # BLD: 321 K/CU MM (ref 140–440)
PMV BLD AUTO: 10 FL (ref 7.5–11.1)
PMV BLD AUTO: 10 FL (ref 7.5–11.1)
PMV BLD AUTO: 10.2 FL (ref 7.5–11.1)
PMV BLD AUTO: 10.3 FL (ref 7.5–11.1)
PMV BLD AUTO: 10.3 FL (ref 7.5–11.1)
PMV BLD AUTO: 10.5 FL (ref 7.5–11.1)
PMV BLD AUTO: 10.8 FL (ref 7.5–11.1)
PMV BLD AUTO: 10.9 FL (ref 7.5–11.1)
PMV BLD AUTO: 11 FL (ref 7.5–11.1)
PMV BLD AUTO: 11 FL (ref 7.5–11.1)
PMV BLD AUTO: 11.4 FL (ref 7.5–11.1)
PMV BLD AUTO: 11.6 FL (ref 7.5–11.1)
PMV BLD AUTO: 11.8 FL (ref 7.5–11.1)
PMV BLD AUTO: 11.9 FL (ref 7.5–11.1)
PMV BLD AUTO: 9.8 FL (ref 7.5–11.1)
PMV BLD AUTO: 9.8 FL (ref 7.5–11.1)
PMV BLD AUTO: 9.9 FL (ref 7.5–11.1)
PMV BLD AUTO: 9.9 FL (ref 7.5–11.1)
PO2 ARTERIAL: 113 MMHG (ref 75–100)
PO2 ARTERIAL: 146 MMHG (ref 75–100)
PO2 ARTERIAL: 33 MMHG (ref 75–100)
PO2 ARTERIAL: 49 MMHG (ref 75–100)
PO2 ARTERIAL: 51 MMHG (ref 75–100)
PO2 ARTERIAL: 53.4 MMHG (ref 75–100)
PO2 ARTERIAL: 58 MMHG (ref 75–100)
PO2 ARTERIAL: 59 MMHG (ref 75–100)
PO2 ARTERIAL: 59 MMHG (ref 75–100)
PO2 ARTERIAL: 61 MMHG (ref 75–100)
PO2 ARTERIAL: 64 MMHG (ref 75–100)
PO2 ARTERIAL: 66 MMHG (ref 75–100)
PO2 ARTERIAL: 67 MMHG (ref 75–100)
PO2 ARTERIAL: 67 MMHG (ref 75–100)
PO2 ARTERIAL: 72 MMHG (ref 75–100)
PO2 ARTERIAL: 72 MMHG (ref 75–100)
PO2 ARTERIAL: 73 MMHG (ref 75–100)
PO2 ARTERIAL: 74 MMHG (ref 75–100)
PO2 ARTERIAL: 80 MMHG (ref 75–100)
PO2 ARTERIAL: 82 MMHG (ref 75–100)
PO2 ARTERIAL: 84 MMHG (ref 75–100)
PO2 ARTERIAL: 84 MMHG (ref 75–100)
PO2 ARTERIAL: 86 MMHG (ref 75–100)
PO2 ARTERIAL: 86 MMHG (ref 75–100)
POC CALCIUM: 1.09 MMOL/L (ref 1.12–1.32)
POC CHLORIDE: 104 MMOL/L (ref 98–109)
POC CREATININE: 1.6 MG/DL (ref 0.9–1.3)
POTASSIUM SERPL-SCNC: 3.9 MMOL/L (ref 3.5–5.1)
POTASSIUM SERPL-SCNC: 4.1 MMOL/L (ref 3.5–5.1)
POTASSIUM SERPL-SCNC: 4.4 MMOL/L (ref 3.5–4.5)
POTASSIUM SERPL-SCNC: 4.4 MMOL/L (ref 3.5–5.1)
POTASSIUM SERPL-SCNC: 4.5 MMOL/L (ref 3.5–5.1)
POTASSIUM SERPL-SCNC: 4.6 MMOL/L (ref 3.5–5.1)
POTASSIUM SERPL-SCNC: 4.7 MMOL/L (ref 3.5–5.1)
POTASSIUM SERPL-SCNC: 4.8 MMOL/L (ref 3.5–5.1)
POTASSIUM SERPL-SCNC: 5 MMOL/L (ref 3.5–5.1)
POTASSIUM SERPL-SCNC: 5.1 MMOL/L (ref 3.5–5.1)
POTASSIUM SERPL-SCNC: 5.2 MMOL/L (ref 3.5–5.1)
POTASSIUM SERPL-SCNC: 5.3 MMOL/L (ref 3.5–5.1)
POTASSIUM SERPL-SCNC: 5.4 MMOL/L (ref 3.5–5.1)
POTASSIUM SERPL-SCNC: 5.5 MMOL/L (ref 3.5–5.1)
POTASSIUM SERPL-SCNC: 5.6 MMOL/L (ref 3.5–5.1)
POTASSIUM SERPL-SCNC: 5.6 MMOL/L (ref 3.5–5.1)
POTASSIUM SERPL-SCNC: 5.7 MMOL/L (ref 3.5–5.1)
POTASSIUM SERPL-SCNC: 5.7 MMOL/L (ref 3.5–5.1)
POTASSIUM SERPL-SCNC: 6.2 MMOL/L (ref 3.5–5.1)
POTASSIUM SERPL-SCNC: 6.7 MMOL/L (ref 3.5–5.1)
PRO-BNP: 4182 PG/ML
PRO-BNP: 5607 PG/ML
PROCALCITONIN: 0.26
PROCALCITONIN: 1.06
PROCALCITONIN: 1.3
PROCALCITONIN: 1.69
PROCALCITONIN: 1.7
PROCALCITONIN: 1.87
PROCALCITONIN: 2.57
PROCALCITONIN: 2.92
PROCALCITONIN: 5.2
PROT/CREAT RATIO, UR: 1.8
PROTEIN UA: 100 MG/DL
PROTEIN UA: ABNORMAL MG/DL
PROTEIN UA: NEGATIVE MG/DL
PROTHROMBIN TIME: 13.3 SECONDS (ref 11.7–14.5)
RBC # BLD: 2.27 M/CU MM (ref 4.6–6.2)
RBC # BLD: 2.36 M/CU MM (ref 4.6–6.2)
RBC # BLD: 2.42 M/CU MM (ref 4.6–6.2)
RBC # BLD: 2.62 M/CU MM (ref 4.6–6.2)
RBC # BLD: 2.82 M/CU MM (ref 4.6–6.2)
RBC # BLD: 2.88 M/CU MM (ref 4.6–6.2)
RBC # BLD: 3.22 M/CU MM (ref 4.6–6.2)
RBC # BLD: 3.26 M/CU MM (ref 4.6–6.2)
RBC # BLD: 3.32 M/CU MM (ref 4.6–6.2)
RBC # BLD: 3.32 M/CU MM (ref 4.6–6.2)
RBC # BLD: 3.41 M/CU MM (ref 4.6–6.2)
RBC # BLD: 3.44 M/CU MM (ref 4.6–6.2)
RBC # BLD: 3.63 M/CU MM (ref 4.6–6.2)
RBC # BLD: 3.8 M/CU MM (ref 4.6–6.2)
RBC # BLD: 3.83 M/CU MM (ref 4.6–6.2)
RBC # BLD: 3.86 M/CU MM (ref 4.6–6.2)
RBC # BLD: 3.91 M/CU MM (ref 4.6–6.2)
RBC # BLD: 4.02 M/CU MM (ref 4.6–6.2)
RBC URINE: 1 /HPF (ref 0–3)
RBC URINE: 29 /HPF (ref 0–3)
RBC URINE: ABNORMAL /HPF (ref 0–3)
SARS-COV-2, NAAT: DETECTED
SEGMENTED NEUTROPHILS ABSOLUTE COUNT: 2.7 K/CU MM
SEGMENTED NEUTROPHILS RELATIVE PERCENT: 72.5 % (ref 36–66)
SODIUM BLD-SCNC: 115 MMOL/L (ref 135–145)
SODIUM BLD-SCNC: 119 MMOL/L (ref 135–145)
SODIUM BLD-SCNC: 120 MMOL/L (ref 135–145)
SODIUM BLD-SCNC: 122 MMOL/L (ref 135–145)
SODIUM BLD-SCNC: 123 MMOL/L (ref 135–145)
SODIUM BLD-SCNC: 124 MMOL/L (ref 135–145)
SODIUM BLD-SCNC: 126 MMOL/L (ref 135–145)
SODIUM BLD-SCNC: 126 MMOL/L (ref 135–145)
SODIUM BLD-SCNC: 127 MMOL/L (ref 135–145)
SODIUM BLD-SCNC: 129 MMOL/L (ref 135–145)
SODIUM BLD-SCNC: 132 MMOL/L (ref 135–145)
SODIUM BLD-SCNC: 133 MMOL/L (ref 135–145)
SODIUM BLD-SCNC: 134 MMOL/L (ref 135–145)
SODIUM BLD-SCNC: 135 MMOL/L (ref 135–145)
SODIUM BLD-SCNC: 136 MMOL/L (ref 135–145)
SODIUM BLD-SCNC: 137 MMOL/L (ref 135–145)
SODIUM BLD-SCNC: 137 MMOL/L (ref 135–145)
SODIUM BLD-SCNC: 137 MMOL/L (ref 138–146)
SODIUM BLD-SCNC: 138 MMOL/L
SODIUM BLD-SCNC: 138 MMOL/L (ref 135–145)
SODIUM BLD-SCNC: 139 MMOL/L (ref 135–145)
SOURCE, BLOOD GAS: ABNORMAL
SOURCE: ABNORMAL
SPECIFIC GRAVITY UA: 1.02 (ref 1–1.03)
SPECIFIC GRAVITY UA: 1.03 (ref 1–1.03)
SPECIFIC GRAVITY UA: 1.03 (ref 1–1.03)
SPECIMEN: ABNORMAL
SPECIMEN: NORMAL
SQUAMOUS EPITHELIAL: <1 /HPF
STATUS: NORMAL
STATUS: NORMAL
T4 FREE: 0.39 NG/DL (ref 0.9–1.8)
TEST NAME: ABNORMAL
TOTAL IMMATURE NEUTOROPHIL: 0.04 K/CU MM
TOTAL PROTEIN: 4.2 GM/DL (ref 6.4–8.2)
TOTAL PROTEIN: 4.3 GM/DL (ref 6.4–8.2)
TOTAL PROTEIN: 4.7 GM/DL (ref 6.4–8.2)
TOTAL PROTEIN: 5.1 GM/DL (ref 6.4–8.2)
TOTAL PROTEIN: 5.2 GM/DL (ref 6.4–8.2)
TOTAL PROTEIN: 5.3 GM/DL (ref 6.4–8.2)
TOTAL PROTEIN: 5.4 GM/DL (ref 6.4–8.2)
TOTAL PROTEIN: 5.4 GM/DL (ref 6.4–8.2)
TOTAL PROTEIN: 5.5 GM/DL (ref 6.4–8.2)
TOTAL PROTEIN: 5.5 GM/DL (ref 6.4–8.2)
TOTAL PROTEIN: 5.6 GM/DL (ref 6.4–8.2)
TOTAL PROTEIN: 5.6 GM/DL (ref 6.4–8.2)
TOTAL PROTEIN: 5.7 GM/DL (ref 6.4–8.2)
TOTAL PROTEIN: 5.8 GM/DL (ref 6.4–8.2)
TOTAL PROTEIN: 6 GM/DL (ref 6.4–8.2)
TOTAL PROTEIN: 6 GM/DL (ref 6.4–8.2)
TOTAL PROTEIN: 6.1 GM/DL (ref 6.4–8.2)
TOTAL PROTEIN: 6.2 GM/DL (ref 6.4–8.2)
TRANSFUSION STATUS: NORMAL
TRANSFUSION STATUS: NORMAL
TRICHOMONAS: ABNORMAL /HPF
TRIGL SERPL-MCNC: 352 MG/DL
TROPONIN T: <0.01 NG/ML
TROPONIN T: <0.01 NG/ML
UNIT DIVISION: NORMAL
UNIT DIVISION: NORMAL
UNIT NUMBER: NORMAL
UNIT NUMBER: NORMAL
URINE TOTAL PROTEIN: 164.1 MG/DL
UROBILINOGEN, URINE: 0.2 MG/DL (ref 0.2–1)
UROBILINOGEN, URINE: 0.2 MG/DL (ref 0.2–1)
UROBILINOGEN, URINE: NORMAL MG/DL (ref 0.2–1)
VANCOMYCIN RANDOM: 15.4 UG/ML
VANCOMYCIN RANDOM: 20.5 UG/ML
VANCOMYCIN RANDOM: 22.8 UG/ML
VANCOMYCIN RANDOM: 27.5 UG/ML
VANCOMYCIN TROUGH: 9.7 UG/ML (ref 10–20)
WBC # BLD: 10.2 K/CU MM (ref 4–10.5)
WBC # BLD: 10.3 K/CU MM (ref 4–10.5)
WBC # BLD: 11 K/CU MM (ref 4–10.5)
WBC # BLD: 11.4 K/CU MM (ref 4–10.5)
WBC # BLD: 15.3 K/CU MM (ref 4–10.5)
WBC # BLD: 20 K/CU MM (ref 4–10.5)
WBC # BLD: 20.4 K/CU MM (ref 4–10.5)
WBC # BLD: 20.9 K/CU MM (ref 4–10.5)
WBC # BLD: 22.1 K/CU MM (ref 4–10.5)
WBC # BLD: 24.6 K/CU MM (ref 4–10.5)
WBC # BLD: 3.7 K/CU MM (ref 4–10.5)
WBC # BLD: 39.6 K/CU MM (ref 4–10.5)
WBC # BLD: 5.4 K/CU MM (ref 4–10.5)
WBC # BLD: 5.6 K/CU MM (ref 4–10.5)
WBC # BLD: 6.1 K/CU MM (ref 4–10.5)
WBC # BLD: 6.8 K/CU MM (ref 4–10.5)
WBC # BLD: 8.1 K/CU MM (ref 4–10.5)
WBC # BLD: 8.9 K/CU MM (ref 4–10.5)
WBC UA: 5 /HPF (ref 0–2)
WBC UA: ABNORMAL /HPF (ref 0–2)
WBC UA: ABNORMAL /HPF (ref 0–2)

## 2021-01-01 PROCEDURE — 94660 CPAP INITIATION&MGMT: CPT

## 2021-01-01 PROCEDURE — 6360000002 HC RX W HCPCS: Performed by: INTERNAL MEDICINE

## 2021-01-01 PROCEDURE — 85027 COMPLETE CBC AUTOMATED: CPT

## 2021-01-01 PROCEDURE — 6360000002 HC RX W HCPCS

## 2021-01-01 PROCEDURE — 80048 BASIC METABOLIC PNL TOTAL CA: CPT

## 2021-01-01 PROCEDURE — 36569 INSJ PICC 5 YR+ W/O IMAGING: CPT

## 2021-01-01 PROCEDURE — 84145 PROCALCITONIN (PCT): CPT

## 2021-01-01 PROCEDURE — 2500000003 HC RX 250 WO HCPCS: Performed by: INTERNAL MEDICINE

## 2021-01-01 PROCEDURE — 93005 ELECTROCARDIOGRAM TRACING: CPT | Performed by: INTERNAL MEDICINE

## 2021-01-01 PROCEDURE — 6370000000 HC RX 637 (ALT 250 FOR IP): Performed by: INTERNAL MEDICINE

## 2021-01-01 PROCEDURE — 99232 SBSQ HOSP IP/OBS MODERATE 35: CPT | Performed by: INTERNAL MEDICINE

## 2021-01-01 PROCEDURE — 94761 N-INVAS EAR/PLS OXIMETRY MLT: CPT

## 2021-01-01 PROCEDURE — 36592 COLLECT BLOOD FROM PICC: CPT

## 2021-01-01 PROCEDURE — 2580000003 HC RX 258: Performed by: INTERNAL MEDICINE

## 2021-01-01 PROCEDURE — 2700000000 HC OXYGEN THERAPY PER DAY

## 2021-01-01 PROCEDURE — 71045 X-RAY EXAM CHEST 1 VIEW: CPT

## 2021-01-01 PROCEDURE — 87040 BLOOD CULTURE FOR BACTERIA: CPT

## 2021-01-01 PROCEDURE — 80053 COMPREHEN METABOLIC PANEL: CPT

## 2021-01-01 PROCEDURE — 36600 WITHDRAWAL OF ARTERIAL BLOOD: CPT

## 2021-01-01 PROCEDURE — 89220 SPUTUM SPECIMEN COLLECTION: CPT

## 2021-01-01 PROCEDURE — 94640 AIRWAY INHALATION TREATMENT: CPT

## 2021-01-01 PROCEDURE — 87305 ASPERGILLUS AG IA: CPT

## 2021-01-01 PROCEDURE — 82330 ASSAY OF CALCIUM: CPT

## 2021-01-01 PROCEDURE — 85384 FIBRINOGEN ACTIVITY: CPT

## 2021-01-01 PROCEDURE — 84484 ASSAY OF TROPONIN QUANT: CPT

## 2021-01-01 PROCEDURE — 84100 ASSAY OF PHOSPHORUS: CPT

## 2021-01-01 PROCEDURE — 51702 INSERT TEMP BLADDER CATH: CPT

## 2021-01-01 PROCEDURE — 94003 VENT MGMT INPAT SUBQ DAY: CPT

## 2021-01-01 PROCEDURE — 2720000010 HC SURG SUPPLY STERILE

## 2021-01-01 PROCEDURE — 84478 ASSAY OF TRIGLYCERIDES: CPT

## 2021-01-01 PROCEDURE — 2500000003 HC RX 250 WO HCPCS: Performed by: STUDENT IN AN ORGANIZED HEALTH CARE EDUCATION/TRAINING PROGRAM

## 2021-01-01 PROCEDURE — 31500 INSERT EMERGENCY AIRWAY: CPT

## 2021-01-01 PROCEDURE — 2000000000 HC ICU R&B

## 2021-01-01 PROCEDURE — 6360000002 HC RX W HCPCS: Performed by: STUDENT IN AN ORGANIZED HEALTH CARE EDUCATION/TRAINING PROGRAM

## 2021-01-01 PROCEDURE — 82962 GLUCOSE BLOOD TEST: CPT

## 2021-01-01 PROCEDURE — 82803 BLOOD GASES ANY COMBINATION: CPT

## 2021-01-01 PROCEDURE — B5181ZA FLUOROSCOPY OF SUPERIOR VENA CAVA USING LOW OSMOLAR CONTRAST, GUIDANCE: ICD-10-PCS | Performed by: INTERNAL MEDICINE

## 2021-01-01 PROCEDURE — 83735 ASSAY OF MAGNESIUM: CPT

## 2021-01-01 PROCEDURE — 85379 FIBRIN DEGRADATION QUANT: CPT

## 2021-01-01 PROCEDURE — 86141 C-REACTIVE PROTEIN HS: CPT

## 2021-01-01 PROCEDURE — 93308 TTE F-UP OR LMTD: CPT

## 2021-01-01 PROCEDURE — 2500000003 HC RX 250 WO HCPCS: Performed by: NURSE PRACTITIONER

## 2021-01-01 PROCEDURE — 6360000002 HC RX W HCPCS: Performed by: PHYSICIAN ASSISTANT

## 2021-01-01 PROCEDURE — 99221 1ST HOSP IP/OBS SF/LOW 40: CPT | Performed by: INTERNAL MEDICINE

## 2021-01-01 PROCEDURE — 90945 DIALYSIS ONE EVALUATION: CPT

## 2021-01-01 PROCEDURE — 86140 C-REACTIVE PROTEIN: CPT

## 2021-01-01 PROCEDURE — 87070 CULTURE OTHR SPECIMN AEROBIC: CPT

## 2021-01-01 PROCEDURE — 80202 ASSAY OF VANCOMYCIN: CPT

## 2021-01-01 PROCEDURE — 2580000003 HC RX 258: Performed by: PHYSICIAN ASSISTANT

## 2021-01-01 PROCEDURE — 96374 THER/PROPH/DIAG INJ IV PUSH: CPT

## 2021-01-01 PROCEDURE — 2580000003 HC RX 258: Performed by: EMERGENCY MEDICINE

## 2021-01-01 PROCEDURE — 6360000002 HC RX W HCPCS: Performed by: EMERGENCY MEDICINE

## 2021-01-01 PROCEDURE — 87081 CULTURE SCREEN ONLY: CPT

## 2021-01-01 PROCEDURE — 87449 NOS EACH ORGANISM AG IA: CPT

## 2021-01-01 PROCEDURE — 83605 ASSAY OF LACTIC ACID: CPT

## 2021-01-01 PROCEDURE — 6370000000 HC RX 637 (ALT 250 FOR IP): Performed by: PHYSICIAN ASSISTANT

## 2021-01-01 PROCEDURE — 85610 PROTHROMBIN TIME: CPT

## 2021-01-01 PROCEDURE — 74018 RADEX ABDOMEN 1 VIEW: CPT

## 2021-01-01 PROCEDURE — 99233 SBSQ HOSP IP/OBS HIGH 50: CPT | Performed by: INTERNAL MEDICINE

## 2021-01-01 PROCEDURE — 36556 INSERT NON-TUNNEL CV CATH: CPT

## 2021-01-01 PROCEDURE — 85025 COMPLETE CBC W/AUTO DIFF WBC: CPT

## 2021-01-01 PROCEDURE — 31500 INSERT EMERGENCY AIRWAY: CPT | Performed by: ANESTHESIOLOGY

## 2021-01-01 PROCEDURE — 84156 ASSAY OF PROTEIN URINE: CPT

## 2021-01-01 PROCEDURE — C9488 CONIVAPTAN HCL: HCPCS | Performed by: INTERNAL MEDICINE

## 2021-01-01 PROCEDURE — 84132 ASSAY OF SERUM POTASSIUM: CPT

## 2021-01-01 PROCEDURE — 2709999900 HC NON-CHARGEABLE SUPPLY

## 2021-01-01 PROCEDURE — 83036 HEMOGLOBIN GLYCOSYLATED A1C: CPT

## 2021-01-01 PROCEDURE — 83880 ASSAY OF NATRIURETIC PEPTIDE: CPT

## 2021-01-01 PROCEDURE — C1752 CATH,HEMODIALYSIS,SHORT-TERM: HCPCS

## 2021-01-01 PROCEDURE — 36591 DRAW BLOOD OFF VENOUS DEVICE: CPT

## 2021-01-01 PROCEDURE — C1751 CATH, INF, PER/CENT/MIDLINE: HCPCS

## 2021-01-01 PROCEDURE — 2060000000 HC ICU INTERMEDIATE R&B

## 2021-01-01 PROCEDURE — P9047 ALBUMIN (HUMAN), 25%, 50ML: HCPCS | Performed by: INTERNAL MEDICINE

## 2021-01-01 PROCEDURE — 82570 ASSAY OF URINE CREATININE: CPT

## 2021-01-01 PROCEDURE — XW13325 TRANSFUSION OF CONVALESCENT PLASMA (NONAUTOLOGOUS) INTO PERIPHERAL VEIN, PERCUTANEOUS APPROACH, NEW TECHNOLOGY GROUP 5: ICD-10-PCS | Performed by: INTERNAL MEDICINE

## 2021-01-01 PROCEDURE — 76937 US GUIDE VASCULAR ACCESS: CPT

## 2021-01-01 PROCEDURE — 82728 ASSAY OF FERRITIN: CPT

## 2021-01-01 PROCEDURE — 02HV33Z INSERTION OF INFUSION DEVICE INTO SUPERIOR VENA CAVA, PERCUTANEOUS APPROACH: ICD-10-PCS | Performed by: INTERNAL MEDICINE

## 2021-01-01 PROCEDURE — 6360000004 HC RX CONTRAST MEDICATION: Performed by: INTERNAL MEDICINE

## 2021-01-01 PROCEDURE — 99223 1ST HOSP IP/OBS HIGH 75: CPT | Performed by: INTERNAL MEDICINE

## 2021-01-01 PROCEDURE — 85730 THROMBOPLASTIN TIME PARTIAL: CPT

## 2021-01-01 PROCEDURE — 31720 CLEARANCE OF AIRWAYS: CPT

## 2021-01-01 PROCEDURE — 84439 ASSAY OF FREE THYROXINE: CPT

## 2021-01-01 PROCEDURE — 5A1955Z RESPIRATORY VENTILATION, GREATER THAN 96 CONSECUTIVE HOURS: ICD-10-PCS | Performed by: INTERNAL MEDICINE

## 2021-01-01 PROCEDURE — 84443 ASSAY THYROID STIM HORMONE: CPT

## 2021-01-01 PROCEDURE — 1200000000 HC SEMI PRIVATE

## 2021-01-01 PROCEDURE — XW033E5 INTRODUCTION OF REMDESIVIR ANTI-INFECTIVE INTO PERIPHERAL VEIN, PERCUTANEOUS APPROACH, NEW TECHNOLOGY GROUP 5: ICD-10-PCS | Performed by: INTERNAL MEDICINE

## 2021-01-01 PROCEDURE — 99285 EMERGENCY DEPT VISIT HI MDM: CPT

## 2021-01-01 PROCEDURE — 6370000000 HC RX 637 (ALT 250 FOR IP): Performed by: EMERGENCY MEDICINE

## 2021-01-01 PROCEDURE — 82040 ASSAY OF SERUM ALBUMIN: CPT

## 2021-01-01 PROCEDURE — 99221 1ST HOSP IP/OBS SF/LOW 40: CPT | Performed by: NURSE PRACTITIONER

## 2021-01-01 PROCEDURE — 87205 SMEAR GRAM STAIN: CPT

## 2021-01-01 PROCEDURE — 87389 HIV-1 AG W/HIV-1&-2 AB AG IA: CPT

## 2021-01-01 PROCEDURE — P9047 ALBUMIN (HUMAN), 25%, 50ML: HCPCS

## 2021-01-01 PROCEDURE — 93010 ELECTROCARDIOGRAM REPORT: CPT | Performed by: INTERNAL MEDICINE

## 2021-01-01 PROCEDURE — 86850 RBC ANTIBODY SCREEN: CPT

## 2021-01-01 PROCEDURE — 86901 BLOOD TYPING SEROLOGIC RH(D): CPT

## 2021-01-01 PROCEDURE — 2500000003 HC RX 250 WO HCPCS

## 2021-01-01 PROCEDURE — 2580000003 HC RX 258: Performed by: NURSE PRACTITIONER

## 2021-01-01 PROCEDURE — 82565 ASSAY OF CREATININE: CPT

## 2021-01-01 PROCEDURE — 81001 URINALYSIS AUTO W/SCOPE: CPT

## 2021-01-01 PROCEDURE — 83520 IMMUNOASSAY QUANT NOS NONAB: CPT

## 2021-01-01 PROCEDURE — 2580000003 HC RX 258

## 2021-01-01 PROCEDURE — 0BH17EZ INSERTION OF ENDOTRACHEAL AIRWAY INTO TRACHEA, VIA NATURAL OR ARTIFICIAL OPENING: ICD-10-PCS | Performed by: INTERNAL MEDICINE

## 2021-01-01 PROCEDURE — 5A1D70Z PERFORMANCE OF URINARY FILTRATION, INTERMITTENT, LESS THAN 6 HOURS PER DAY: ICD-10-PCS | Performed by: INTERNAL MEDICINE

## 2021-01-01 PROCEDURE — 94002 VENT MGMT INPAT INIT DAY: CPT

## 2021-01-01 PROCEDURE — 93970 EXTREMITY STUDY: CPT

## 2021-01-01 PROCEDURE — U0002 COVID-19 LAB TEST NON-CDC: HCPCS

## 2021-01-01 PROCEDURE — 86900 BLOOD TYPING SEROLOGIC ABO: CPT

## 2021-01-01 PROCEDURE — 71260 CT THORAX DX C+: CPT

## 2021-01-01 PROCEDURE — 93005 ELECTROCARDIOGRAM TRACING: CPT | Performed by: EMERGENCY MEDICINE

## 2021-01-01 RX ORDER — INSULIN GLARGINE 100 [IU]/ML
70 INJECTION, SOLUTION SUBCUTANEOUS DAILY
Status: DISCONTINUED | OUTPATIENT
Start: 2021-01-01 | End: 2021-01-01

## 2021-01-01 RX ORDER — SODIUM CHLORIDE 0.9 % (FLUSH) 0.9 %
10 SYRINGE (ML) INJECTION PRN
Status: CANCELLED | OUTPATIENT
Start: 2021-01-01

## 2021-01-01 RX ORDER — GUAIFENESIN/DEXTROMETHORPHAN 100-10MG/5
5 SYRUP ORAL EVERY 4 HOURS PRN
Status: CANCELLED | OUTPATIENT
Start: 2021-01-01

## 2021-01-01 RX ORDER — POLYVINYL ALCOHOL 14 MG/ML
1 SOLUTION/ DROPS OPHTHALMIC EVERY 4 HOURS PRN
Status: DISCONTINUED | OUTPATIENT
Start: 2021-01-01 | End: 2021-01-01 | Stop reason: HOSPADM

## 2021-01-01 RX ORDER — INSULIN GLARGINE 100 [IU]/ML
60 INJECTION, SOLUTION SUBCUTANEOUS NIGHTLY
Status: DISCONTINUED | OUTPATIENT
Start: 2021-01-01 | End: 2021-01-01

## 2021-01-01 RX ORDER — INSULIN GLARGINE 100 [IU]/ML
20 INJECTION, SOLUTION SUBCUTANEOUS ONCE
Status: COMPLETED | OUTPATIENT
Start: 2021-01-01 | End: 2021-01-01

## 2021-01-01 RX ORDER — MAGNESIUM SULFATE 1 G/100ML
1000 INJECTION INTRAVENOUS PRN
Status: DISCONTINUED | OUTPATIENT
Start: 2021-01-01 | End: 2021-01-01 | Stop reason: HOSPADM

## 2021-01-01 RX ORDER — POLYETHYLENE GLYCOL 3350 17 G/17G
17 POWDER, FOR SOLUTION ORAL DAILY PRN
Status: DISCONTINUED | OUTPATIENT
Start: 2021-01-01 | End: 2021-01-01 | Stop reason: HOSPADM

## 2021-01-01 RX ORDER — FOLIC ACID 1 MG/1
1 TABLET ORAL DAILY
Status: DISCONTINUED | OUTPATIENT
Start: 2021-01-01 | End: 2021-01-01 | Stop reason: HOSPADM

## 2021-01-01 RX ORDER — CHLORHEXIDINE GLUCONATE 0.12 MG/ML
15 RINSE ORAL 2 TIMES DAILY
Status: DISCONTINUED | OUTPATIENT
Start: 2021-01-01 | End: 2021-01-01 | Stop reason: HOSPADM

## 2021-01-01 RX ORDER — HEPARIN SODIUM 1000 [USP'U]/ML
2000 INJECTION, SOLUTION INTRAVENOUS; SUBCUTANEOUS
Status: ACTIVE | OUTPATIENT
Start: 2021-01-01 | End: 2021-01-01

## 2021-01-01 RX ORDER — FENOFIBRATE 160 MG/1
160 TABLET ORAL DAILY
Status: DISCONTINUED | OUTPATIENT
Start: 2021-01-01 | End: 2021-01-01

## 2021-01-01 RX ORDER — ALBUMIN (HUMAN) 12.5 G/50ML
SOLUTION INTRAVENOUS
Status: COMPLETED
Start: 2021-01-01 | End: 2021-01-01

## 2021-01-01 RX ORDER — CHLORTHALIDONE 25 MG/1
25 TABLET ORAL DAILY
Status: DISCONTINUED | OUTPATIENT
Start: 2021-01-01 | End: 2021-01-01

## 2021-01-01 RX ORDER — 0.9 % SODIUM CHLORIDE 0.9 %
250 INTRAVENOUS SOLUTION INTRAVENOUS ONCE
Status: COMPLETED | OUTPATIENT
Start: 2021-01-01 | End: 2021-01-01

## 2021-01-01 RX ORDER — GLIPIZIDE 10 MG/1
10 TABLET, FILM COATED, EXTENDED RELEASE ORAL DAILY
COMMUNITY

## 2021-01-01 RX ORDER — ALBUMIN (HUMAN) 12.5 G/50ML
25 SOLUTION INTRAVENOUS ONCE
Status: COMPLETED | OUTPATIENT
Start: 2021-01-01 | End: 2021-01-01

## 2021-01-01 RX ORDER — METHYLPREDNISOLONE SODIUM SUCCINATE 40 MG/ML
40 INJECTION, POWDER, LYOPHILIZED, FOR SOLUTION INTRAMUSCULAR; INTRAVENOUS EVERY 6 HOURS
Status: DISCONTINUED | OUTPATIENT
Start: 2021-01-01 | End: 2021-01-01

## 2021-01-01 RX ORDER — PIOGLITAZONEHYDROCHLORIDE 30 MG/1
30 TABLET ORAL DAILY
Status: DISCONTINUED | OUTPATIENT
Start: 2021-01-01 | End: 2021-01-01

## 2021-01-01 RX ORDER — INSULIN GLARGINE 100 [IU]/ML
30 INJECTION, SOLUTION SUBCUTANEOUS NIGHTLY
Status: DISCONTINUED | OUTPATIENT
Start: 2021-01-01 | End: 2021-01-01

## 2021-01-01 RX ORDER — ASPIRIN 81 MG/1
81 TABLET, CHEWABLE ORAL DAILY
Status: DISCONTINUED | OUTPATIENT
Start: 2021-01-01 | End: 2021-01-01

## 2021-01-01 RX ORDER — EZETIMIBE 10 MG/1
10 TABLET ORAL DAILY
COMMUNITY

## 2021-01-01 RX ORDER — ATORVASTATIN CALCIUM 10 MG/1
10 TABLET, FILM COATED ORAL DAILY
COMMUNITY

## 2021-01-01 RX ORDER — LORAZEPAM 2 MG/ML
0.5 INJECTION INTRAMUSCULAR ONCE
Status: DISCONTINUED | OUTPATIENT
Start: 2021-01-01 | End: 2021-01-01

## 2021-01-01 RX ORDER — LOSARTAN POTASSIUM 50 MG/1
100 TABLET ORAL DAILY
Status: DISCONTINUED | OUTPATIENT
Start: 2021-01-01 | End: 2021-01-01

## 2021-01-01 RX ORDER — FAMOTIDINE 20 MG/1
20 TABLET, FILM COATED ORAL 2 TIMES DAILY
Status: DISCONTINUED | OUTPATIENT
Start: 2021-01-01 | End: 2021-01-01

## 2021-01-01 RX ORDER — HEPARIN SODIUM 1000 [USP'U]/ML
5000 INJECTION, SOLUTION INTRAVENOUS; SUBCUTANEOUS PRN
Status: DISCONTINUED | OUTPATIENT
Start: 2021-01-01 | End: 2021-01-01 | Stop reason: ALTCHOICE

## 2021-01-01 RX ORDER — DEXTROSE MONOHYDRATE 100 MG/ML
INJECTION, SOLUTION INTRAVENOUS CONTINUOUS
Status: DISPENSED | OUTPATIENT
Start: 2021-01-01 | End: 2021-01-01

## 2021-01-01 RX ORDER — PROMETHAZINE HYDROCHLORIDE 12.5 MG/1
12.5 TABLET ORAL EVERY 6 HOURS PRN
Status: CANCELLED | OUTPATIENT
Start: 2021-01-01

## 2021-01-01 RX ORDER — INSULIN GLARGINE 100 [IU]/ML
35 INJECTION, SOLUTION SUBCUTANEOUS NIGHTLY
Status: DISCONTINUED | OUTPATIENT
Start: 2021-01-01 | End: 2021-01-01

## 2021-01-01 RX ORDER — DOPAMINE HYDROCHLORIDE 160 MG/100ML
INJECTION, SOLUTION INTRAVENOUS
Status: DISPENSED
Start: 2021-01-01 | End: 2021-01-01

## 2021-01-01 RX ORDER — MORPHINE SULFATE 4 MG/ML
1 INJECTION, SOLUTION INTRAMUSCULAR; INTRAVENOUS
Status: DISCONTINUED | OUTPATIENT
Start: 2021-01-01 | End: 2021-01-01 | Stop reason: HOSPADM

## 2021-01-01 RX ORDER — DEXAMETHASONE SODIUM PHOSPHATE 4 MG/ML
10 INJECTION, SOLUTION INTRA-ARTICULAR; INTRALESIONAL; INTRAMUSCULAR; INTRAVENOUS; SOFT TISSUE EVERY 6 HOURS
Status: DISCONTINUED | OUTPATIENT
Start: 2021-01-01 | End: 2021-01-01

## 2021-01-01 RX ORDER — DIAZEPAM 5 MG/1
5 TABLET ORAL 3 TIMES DAILY PRN
COMMUNITY

## 2021-01-01 RX ORDER — FUROSEMIDE 10 MG/ML
40 INJECTION INTRAMUSCULAR; INTRAVENOUS ONCE
Status: COMPLETED | OUTPATIENT
Start: 2021-01-01 | End: 2021-01-01

## 2021-01-01 RX ORDER — LORAZEPAM 2 MG/ML
1 INJECTION INTRAMUSCULAR
Status: DISCONTINUED | OUTPATIENT
Start: 2021-01-01 | End: 2021-01-01 | Stop reason: HOSPADM

## 2021-01-01 RX ORDER — AMLODIPINE BESYLATE 10 MG/1
10 TABLET ORAL DAILY
Status: DISCONTINUED | OUTPATIENT
Start: 2021-01-01 | End: 2021-01-01

## 2021-01-01 RX ORDER — CHLORTHALIDONE 25 MG/1
25 TABLET ORAL DAILY
COMMUNITY

## 2021-01-01 RX ORDER — METOCLOPRAMIDE HYDROCHLORIDE 5 MG/ML
10 INJECTION INTRAMUSCULAR; INTRAVENOUS 3 TIMES DAILY
Status: DISCONTINUED | OUTPATIENT
Start: 2021-01-01 | End: 2021-01-01

## 2021-01-01 RX ORDER — 0.9 % SODIUM CHLORIDE 0.9 %
500 INTRAVENOUS SOLUTION INTRAVENOUS ONCE
Status: COMPLETED | OUTPATIENT
Start: 2021-01-01 | End: 2021-01-01

## 2021-01-01 RX ORDER — ACETAMINOPHEN 325 MG/1
650 TABLET ORAL EVERY 6 HOURS PRN
Status: DISCONTINUED | OUTPATIENT
Start: 2021-01-01 | End: 2021-01-01

## 2021-01-01 RX ORDER — ACETAMINOPHEN 325 MG/1
650 TABLET ORAL EVERY 6 HOURS PRN
Status: CANCELLED | OUTPATIENT
Start: 2021-01-01

## 2021-01-01 RX ORDER — HEPARIN SODIUM 5000 [USP'U]/ML
5000 INJECTION, SOLUTION INTRAVENOUS; SUBCUTANEOUS EVERY 8 HOURS SCHEDULED
Status: DISCONTINUED | OUTPATIENT
Start: 2021-01-01 | End: 2021-01-01 | Stop reason: HOSPADM

## 2021-01-01 RX ORDER — FUROSEMIDE 10 MG/ML
40 INJECTION INTRAMUSCULAR; INTRAVENOUS ONCE
Status: DISCONTINUED | OUTPATIENT
Start: 2021-01-01 | End: 2021-01-01

## 2021-01-01 RX ORDER — ALBUTEROL SULFATE 90 UG/1
4 AEROSOL, METERED RESPIRATORY (INHALATION) EVERY 4 HOURS
Status: DISCONTINUED | OUTPATIENT
Start: 2021-01-01 | End: 2021-01-01 | Stop reason: HOSPADM

## 2021-01-01 RX ORDER — AMLODIPINE BESYLATE 10 MG/1
10 TABLET ORAL DAILY
COMMUNITY

## 2021-01-01 RX ORDER — FAMOTIDINE 20 MG/1
20 TABLET, FILM COATED ORAL 2 TIMES DAILY
Status: CANCELLED | OUTPATIENT
Start: 2021-01-01

## 2021-01-01 RX ORDER — OLMESARTAN MEDOXOMIL 40 MG/1
40 TABLET ORAL DAILY
COMMUNITY

## 2021-01-01 RX ORDER — SODIUM CHLORIDE 9 MG/ML
INJECTION, SOLUTION INTRAVENOUS PRN
Status: COMPLETED | OUTPATIENT
Start: 2021-01-01 | End: 2021-01-01

## 2021-01-01 RX ORDER — DEXTROSE MONOHYDRATE 50 MG/ML
100 INJECTION, SOLUTION INTRAVENOUS PRN
Status: DISCONTINUED | OUTPATIENT
Start: 2021-01-01 | End: 2021-01-01 | Stop reason: HOSPADM

## 2021-01-01 RX ORDER — DEXTROSE MONOHYDRATE 25 G/50ML
12.5 INJECTION, SOLUTION INTRAVENOUS PRN
Status: DISCONTINUED | OUTPATIENT
Start: 2021-01-01 | End: 2021-01-01 | Stop reason: SDUPTHER

## 2021-01-01 RX ORDER — DEXAMETHASONE SODIUM PHOSPHATE 4 MG/ML
6 INJECTION, SOLUTION INTRA-ARTICULAR; INTRALESIONAL; INTRAMUSCULAR; INTRAVENOUS; SOFT TISSUE EVERY 6 HOURS
Status: DISCONTINUED | OUTPATIENT
Start: 2021-01-01 | End: 2021-01-01

## 2021-01-01 RX ORDER — SODIUM CHLORIDE, SODIUM LACTATE, POTASSIUM CHLORIDE, CALCIUM CHLORIDE 600; 310; 30; 20 MG/100ML; MG/100ML; MG/100ML; MG/100ML
INJECTION, SOLUTION INTRAVENOUS CONTINUOUS
Status: DISCONTINUED | OUTPATIENT
Start: 2021-01-01 | End: 2021-01-01

## 2021-01-01 RX ORDER — POLYETHYLENE GLYCOL 3350 17 G/17G
17 POWDER, FOR SOLUTION ORAL DAILY PRN
Status: CANCELLED | OUTPATIENT
Start: 2021-01-01

## 2021-01-01 RX ORDER — FOLIC ACID 1 MG/1
1 TABLET ORAL DAILY
COMMUNITY

## 2021-01-01 RX ORDER — PIOGLITAZONE HCL AND METFORMIN HCL 850; 15 MG/1; MG/1
1 TABLET ORAL 2 TIMES DAILY WITH MEALS
COMMUNITY

## 2021-01-01 RX ORDER — HEPARIN SODIUM 1000 [USP'U]/ML
2600 INJECTION, SOLUTION INTRAVENOUS; SUBCUTANEOUS PRN
Status: DISCONTINUED | OUTPATIENT
Start: 2021-01-01 | End: 2021-01-01 | Stop reason: HOSPADM

## 2021-01-01 RX ORDER — POTASSIUM CHLORIDE 29.8 MG/ML
20 INJECTION INTRAVENOUS PRN
Status: DISCONTINUED | OUTPATIENT
Start: 2021-01-01 | End: 2021-01-01 | Stop reason: HOSPADM

## 2021-01-01 RX ORDER — SODIUM CHLORIDE 0.9 % (FLUSH) 0.9 %
10 SYRINGE (ML) INJECTION PRN
Status: DISCONTINUED | OUTPATIENT
Start: 2021-01-01 | End: 2021-01-01 | Stop reason: HOSPADM

## 2021-01-01 RX ORDER — NALOXONE HYDROCHLORIDE 0.4 MG/ML
0.4 INJECTION, SOLUTION INTRAMUSCULAR; INTRAVENOUS; SUBCUTANEOUS PRN
Status: DISCONTINUED | OUTPATIENT
Start: 2021-01-01 | End: 2021-01-01 | Stop reason: HOSPADM

## 2021-01-01 RX ORDER — DIAZEPAM 5 MG/1
5 TABLET ORAL 3 TIMES DAILY PRN
Status: DISCONTINUED | OUTPATIENT
Start: 2021-01-01 | End: 2021-01-01 | Stop reason: HOSPADM

## 2021-01-01 RX ORDER — DOPAMINE HYDROCHLORIDE 160 MG/100ML
2.5 INJECTION, SOLUTION INTRAVENOUS CONTINUOUS
Status: DISCONTINUED | OUTPATIENT
Start: 2021-01-01 | End: 2021-01-01 | Stop reason: ALTCHOICE

## 2021-01-01 RX ORDER — PROMETHAZINE HYDROCHLORIDE 12.5 MG/1
12.5 TABLET ORAL EVERY 6 HOURS PRN
Status: DISCONTINUED | OUTPATIENT
Start: 2021-01-01 | End: 2021-01-01 | Stop reason: HOSPADM

## 2021-01-01 RX ORDER — 0.9 % SODIUM CHLORIDE 0.9 %
30 INTRAVENOUS SOLUTION INTRAVENOUS PRN
Status: DISCONTINUED | OUTPATIENT
Start: 2021-01-01 | End: 2021-01-01 | Stop reason: HOSPADM

## 2021-01-01 RX ORDER — DEXTROSE MONOHYDRATE 25 G/50ML
12.5 INJECTION, SOLUTION INTRAVENOUS PRN
Status: DISCONTINUED | OUTPATIENT
Start: 2021-01-01 | End: 2021-01-01 | Stop reason: HOSPADM

## 2021-01-01 RX ORDER — INSULIN GLARGINE 100 [IU]/ML
70 INJECTION, SOLUTION SUBCUTANEOUS NIGHTLY
Status: DISCONTINUED | OUTPATIENT
Start: 2021-01-01 | End: 2021-01-01

## 2021-01-01 RX ORDER — METHYLPREDNISOLONE SODIUM SUCCINATE 40 MG/ML
40 INJECTION, POWDER, LYOPHILIZED, FOR SOLUTION INTRAMUSCULAR; INTRAVENOUS EVERY 6 HOURS
Status: COMPLETED | OUTPATIENT
Start: 2021-01-01 | End: 2021-01-01

## 2021-01-01 RX ORDER — ASPIRIN 81 MG/1
324 TABLET, CHEWABLE ORAL ONCE
Status: COMPLETED | OUTPATIENT
Start: 2021-01-01 | End: 2021-01-01

## 2021-01-01 RX ORDER — ALBUTEROL SULFATE 90 UG/1
4 AEROSOL, METERED RESPIRATORY (INHALATION) EVERY 4 HOURS
Status: DISCONTINUED | OUTPATIENT
Start: 2021-01-01 | End: 2021-01-01

## 2021-01-01 RX ORDER — MORPHINE SULFATE/0.9% NACL/PF 1 MG/ML
SYRINGE (ML) INJECTION CONTINUOUS
Status: DISCONTINUED | OUTPATIENT
Start: 2021-01-01 | End: 2021-01-01 | Stop reason: HOSPADM

## 2021-01-01 RX ORDER — HYDROCODONE BITARTRATE AND ACETAMINOPHEN 5; 325 MG/1; MG/1
1 TABLET ORAL EVERY 6 HOURS PRN
COMMUNITY

## 2021-01-01 RX ORDER — LEVOTHYROXINE SODIUM 0.07 MG/1
75 TABLET ORAL DAILY
Status: DISCONTINUED | OUTPATIENT
Start: 2021-01-01 | End: 2021-01-01

## 2021-01-01 RX ORDER — CALCIUM GLUCONATE 20 MG/ML
1 INJECTION, SOLUTION INTRAVENOUS PRN
Status: DISCONTINUED | OUTPATIENT
Start: 2021-01-01 | End: 2021-01-01 | Stop reason: HOSPADM

## 2021-01-01 RX ORDER — FENOFIBRATE 200 MG/1
200 CAPSULE ORAL
COMMUNITY

## 2021-01-01 RX ORDER — SCOLOPAMINE TRANSDERMAL SYSTEM 1 MG/1
1 PATCH, EXTENDED RELEASE TRANSDERMAL
Status: DISCONTINUED | OUTPATIENT
Start: 2021-01-01 | End: 2021-01-01 | Stop reason: HOSPADM

## 2021-01-01 RX ORDER — SODIUM CHLORIDE 0.9 % (FLUSH) 0.9 %
10 SYRINGE (ML) INJECTION EVERY 12 HOURS SCHEDULED
Status: DISCONTINUED | OUTPATIENT
Start: 2021-01-01 | End: 2021-01-01 | Stop reason: HOSPADM

## 2021-01-01 RX ORDER — INSULIN GLARGINE 300 U/ML
70 INJECTION, SOLUTION SUBCUTANEOUS DAILY
COMMUNITY

## 2021-01-01 RX ORDER — ONDANSETRON 2 MG/ML
4 INJECTION INTRAMUSCULAR; INTRAVENOUS EVERY 6 HOURS PRN
Status: CANCELLED | OUTPATIENT
Start: 2021-01-01

## 2021-01-01 RX ORDER — MINOCYCLINE HYDROCHLORIDE 100 MG/1
100 CAPSULE ORAL 2 TIMES DAILY
Status: DISCONTINUED | OUTPATIENT
Start: 2021-01-01 | End: 2021-01-01

## 2021-01-01 RX ORDER — HEPARIN SODIUM 1000 [USP'U]/ML
10000 INJECTION, SOLUTION INTRAVENOUS; SUBCUTANEOUS PRN
Status: DISCONTINUED | OUTPATIENT
Start: 2021-01-01 | End: 2021-01-01 | Stop reason: ALTCHOICE

## 2021-01-01 RX ORDER — GUAIFENESIN/DEXTROMETHORPHAN 100-10MG/5
5 SYRUP ORAL EVERY 4 HOURS PRN
Status: DISCONTINUED | OUTPATIENT
Start: 2021-01-01 | End: 2021-01-01 | Stop reason: HOSPADM

## 2021-01-01 RX ORDER — FUROSEMIDE 10 MG/ML
40 INJECTION INTRAMUSCULAR; INTRAVENOUS 3 TIMES DAILY
Status: DISCONTINUED | OUTPATIENT
Start: 2021-01-01 | End: 2021-01-01

## 2021-01-01 RX ORDER — LEVOTHYROXINE SODIUM 0.07 MG/1
75 TABLET ORAL DAILY
COMMUNITY

## 2021-01-01 RX ORDER — TRAMADOL HYDROCHLORIDE 50 MG/1
50 TABLET ORAL ONCE
Status: COMPLETED | OUTPATIENT
Start: 2021-01-01 | End: 2021-01-01

## 2021-01-01 RX ORDER — DEXAMETHASONE 4 MG/1
6 TABLET ORAL DAILY
Status: CANCELLED | OUTPATIENT
Start: 2021-01-01 | End: 2021-01-01

## 2021-01-01 RX ORDER — DEXTROSE MONOHYDRATE 50 MG/ML
100 INJECTION, SOLUTION INTRAVENOUS PRN
Status: DISCONTINUED | OUTPATIENT
Start: 2021-01-01 | End: 2021-01-01 | Stop reason: SDUPTHER

## 2021-01-01 RX ORDER — SODIUM CHLORIDE 0.9 % (FLUSH) 0.9 %
10 SYRINGE (ML) INJECTION EVERY 12 HOURS SCHEDULED
Status: CANCELLED | OUTPATIENT
Start: 2021-01-01

## 2021-01-01 RX ORDER — ACETAMINOPHEN 650 MG/1
650 SUPPOSITORY RECTAL EVERY 6 HOURS PRN
Status: DISCONTINUED | OUTPATIENT
Start: 2021-01-01 | End: 2021-01-01 | Stop reason: HOSPADM

## 2021-01-01 RX ORDER — ACETAMINOPHEN 650 MG/1
650 SUPPOSITORY RECTAL EVERY 6 HOURS PRN
Status: DISCONTINUED | OUTPATIENT
Start: 2021-01-01 | End: 2021-01-01

## 2021-01-01 RX ORDER — MESALAMINE 1.2 G/1
2400 TABLET, DELAYED RELEASE ORAL
COMMUNITY

## 2021-01-01 RX ORDER — ACETAMINOPHEN 500 MG
1000 TABLET ORAL ONCE
Status: COMPLETED | OUTPATIENT
Start: 2021-01-01 | End: 2021-01-01

## 2021-01-01 RX ORDER — PANTOPRAZOLE SODIUM 40 MG/1
40 TABLET, DELAYED RELEASE ORAL
Status: DISCONTINUED | OUTPATIENT
Start: 2021-01-01 | End: 2021-01-01 | Stop reason: HOSPADM

## 2021-01-01 RX ORDER — INSULIN GLARGINE 100 [IU]/ML
50 INJECTION, SOLUTION SUBCUTANEOUS NIGHTLY
Status: DISCONTINUED | OUTPATIENT
Start: 2021-01-01 | End: 2021-01-01

## 2021-01-01 RX ORDER — EZETIMIBE 10 MG/1
10 TABLET ORAL DAILY
Status: DISCONTINUED | OUTPATIENT
Start: 2021-01-01 | End: 2021-01-01

## 2021-01-01 RX ORDER — ACETAMINOPHEN 325 MG/1
650 TABLET ORAL EVERY 6 HOURS PRN
Status: DISCONTINUED | OUTPATIENT
Start: 2021-01-01 | End: 2021-01-01 | Stop reason: HOSPADM

## 2021-01-01 RX ORDER — HEPARIN SODIUM 10000 [USP'U]/100ML
2100 INJECTION, SOLUTION INTRAVENOUS CONTINUOUS
Status: DISCONTINUED | OUTPATIENT
Start: 2021-01-01 | End: 2021-01-01

## 2021-01-01 RX ORDER — LIDOCAINE HYDROCHLORIDE 10 MG/ML
5 INJECTION, SOLUTION EPIDURAL; INFILTRATION; INTRACAUDAL; PERINEURAL ONCE
Status: COMPLETED | OUTPATIENT
Start: 2021-01-01 | End: 2021-01-01

## 2021-01-01 RX ORDER — ALBUTEROL SULFATE 90 UG/1
2 AEROSOL, METERED RESPIRATORY (INHALATION) EVERY 6 HOURS PRN
Status: DISCONTINUED | OUTPATIENT
Start: 2021-01-01 | End: 2021-01-01 | Stop reason: HOSPADM

## 2021-01-01 RX ORDER — MINERAL OIL AND WHITE PETROLATUM 150; 830 MG/G; MG/G
OINTMENT OPHTHALMIC EVERY 4 HOURS PRN
Status: DISCONTINUED | OUTPATIENT
Start: 2021-01-01 | End: 2021-01-01 | Stop reason: HOSPADM

## 2021-01-01 RX ORDER — PROPOFOL 10 MG/ML
10 INJECTION, EMULSION INTRAVENOUS CONTINUOUS
Status: DISCONTINUED | OUTPATIENT
Start: 2021-01-01 | End: 2021-01-01 | Stop reason: ALTCHOICE

## 2021-01-01 RX ORDER — MESALAMINE 400 MG/1
800 CAPSULE, DELAYED RELEASE ORAL 3 TIMES DAILY
Status: DISCONTINUED | OUTPATIENT
Start: 2021-01-01 | End: 2021-01-01 | Stop reason: HOSPADM

## 2021-01-01 RX ORDER — PROPOFOL 10 MG/ML
10 INJECTION, EMULSION INTRAVENOUS CONTINUOUS
Status: DISCONTINUED | OUTPATIENT
Start: 2021-01-01 | End: 2021-01-01

## 2021-01-01 RX ORDER — CALCIUM GLUCONATE 20 MG/ML
2 INJECTION, SOLUTION INTRAVENOUS PRN
Status: DISCONTINUED | OUTPATIENT
Start: 2021-01-01 | End: 2021-01-01 | Stop reason: HOSPADM

## 2021-01-01 RX ORDER — HEPARIN SODIUM 1000 [USP'U]/ML
10000 INJECTION, SOLUTION INTRAVENOUS; SUBCUTANEOUS ONCE
Status: COMPLETED | OUTPATIENT
Start: 2021-01-01 | End: 2021-01-01

## 2021-01-01 RX ORDER — OMEPRAZOLE 20 MG/1
20 CAPSULE, DELAYED RELEASE ORAL DAILY
COMMUNITY

## 2021-01-01 RX ORDER — ONDANSETRON 2 MG/ML
4 INJECTION INTRAMUSCULAR; INTRAVENOUS EVERY 6 HOURS PRN
Status: DISCONTINUED | OUTPATIENT
Start: 2021-01-01 | End: 2021-01-01 | Stop reason: HOSPADM

## 2021-01-01 RX ORDER — NICOTINE POLACRILEX 4 MG
15 LOZENGE BUCCAL PRN
Status: DISCONTINUED | OUTPATIENT
Start: 2021-01-01 | End: 2021-01-01 | Stop reason: HOSPADM

## 2021-01-01 RX ORDER — NICOTINE POLACRILEX 4 MG
15 LOZENGE BUCCAL PRN
Status: DISCONTINUED | OUTPATIENT
Start: 2021-01-01 | End: 2021-01-01 | Stop reason: SDUPTHER

## 2021-01-01 RX ORDER — ATORVASTATIN CALCIUM 10 MG/1
10 TABLET, FILM COATED ORAL DAILY
Status: DISCONTINUED | OUTPATIENT
Start: 2021-01-01 | End: 2021-01-01 | Stop reason: HOSPADM

## 2021-01-01 RX ORDER — DEXAMETHASONE 4 MG/1
6 TABLET ORAL DAILY
Status: DISPENSED | OUTPATIENT
Start: 2021-01-01 | End: 2021-01-01

## 2021-01-01 RX ADMIN — ASPIRIN 81 MG CHEWABLE TABLET 81 MG: 81 TABLET CHEWABLE at 08:04

## 2021-01-01 RX ADMIN — ACETAMINOPHEN 650 MG: 650 SUPPOSITORY RECTAL at 08:41

## 2021-01-01 RX ADMIN — Medication 1600 ML/HR: at 02:49

## 2021-01-01 RX ADMIN — IPRATROPIUM BROMIDE 4 PUFF: 17 AEROSOL, METERED RESPIRATORY (INHALATION) at 15:56

## 2021-01-01 RX ADMIN — MEROPENEM 1000 MG: 1 INJECTION, POWDER, FOR SOLUTION INTRAVENOUS at 02:32

## 2021-01-01 RX ADMIN — ASPIRIN 81 MG CHEWABLE TABLET 81 MG: 81 TABLET CHEWABLE at 09:18

## 2021-01-01 RX ADMIN — CHLORHEXIDINE GLUCONATE 0.12% ORAL RINSE 15 ML: 1.2 LIQUID ORAL at 21:22

## 2021-01-01 RX ADMIN — ATORVASTATIN CALCIUM 10 MG: 10 TABLET, FILM COATED ORAL at 09:18

## 2021-01-01 RX ADMIN — SODIUM CHLORIDE 3 MCG/KG/MIN: 9 INJECTION, SOLUTION INTRAVENOUS at 00:30

## 2021-01-01 RX ADMIN — MESALAMINE 800 MG: 400 CAPSULE, DELAYED RELEASE ORAL at 15:04

## 2021-01-01 RX ADMIN — GUAIFENESIN AND DEXTROMETHORPHAN 5 ML: 100; 10 SYRUP ORAL at 10:22

## 2021-01-01 RX ADMIN — EZETIMIBE 10 MG: 10 TABLET ORAL at 08:09

## 2021-01-01 RX ADMIN — ASPIRIN 81 MG CHEWABLE TABLET 81 MG: 81 TABLET CHEWABLE at 09:58

## 2021-01-01 RX ADMIN — Medication 4 PUFF: at 20:34

## 2021-01-01 RX ADMIN — PROPOFOL 60 MCG/KG/MIN: 10 INJECTION, EMULSION INTRAVENOUS at 13:40

## 2021-01-01 RX ADMIN — INSULIN LISPRO 6 UNITS: 100 INJECTION, SOLUTION INTRAVENOUS; SUBCUTANEOUS at 04:12

## 2021-01-01 RX ADMIN — CEFEPIME 2000 MG: 2 INJECTION, POWDER, FOR SOLUTION INTRAVENOUS at 11:03

## 2021-01-01 RX ADMIN — MICONAZOLE NITRATE: 20 POWDER TOPICAL at 21:10

## 2021-01-01 RX ADMIN — PROPOFOL 50 MCG/KG/MIN: 10 INJECTION, EMULSION INTRAVENOUS at 12:54

## 2021-01-01 RX ADMIN — IPRATROPIUM BROMIDE 4 PUFF: 17 AEROSOL, METERED RESPIRATORY (INHALATION) at 08:23

## 2021-01-01 RX ADMIN — DEXTROSE 15 G: 15 GEL ORAL at 15:27

## 2021-01-01 RX ADMIN — METOCLOPRAMIDE 10 MG: 5 INJECTION, SOLUTION INTRAMUSCULAR; INTRAVENOUS at 06:33

## 2021-01-01 RX ADMIN — Medication 1600 ML/HR: at 05:45

## 2021-01-01 RX ADMIN — ALBUTEROL SULFATE 4 PUFF: 90 AEROSOL, METERED RESPIRATORY (INHALATION) at 05:25

## 2021-01-01 RX ADMIN — Medication: at 22:22

## 2021-01-01 RX ADMIN — CALCIUM GLUCONATE 1000 MG: 20 INJECTION, SOLUTION INTRAVENOUS at 16:40

## 2021-01-01 RX ADMIN — GUAIFENESIN AND DEXTROMETHORPHAN 5 ML: 100; 10 SYRUP ORAL at 20:58

## 2021-01-01 RX ADMIN — CALCIUM GLUCONATE 1000 MG: 20 INJECTION, SOLUTION INTRAVENOUS at 08:47

## 2021-01-01 RX ADMIN — Medication 4 PUFF: at 13:31

## 2021-01-01 RX ADMIN — ASPIRIN 81 MG CHEWABLE TABLET 81 MG: 81 TABLET CHEWABLE at 08:56

## 2021-01-01 RX ADMIN — MINOCYCLINE HYDROCHLORIDE 100 MG: 100 CAPSULE ORAL at 21:19

## 2021-01-01 RX ADMIN — Medication: at 14:05

## 2021-01-01 RX ADMIN — MINOCYCLINE HYDROCHLORIDE 100 MG: 100 CAPSULE ORAL at 09:58

## 2021-01-01 RX ADMIN — PROPOFOL 20 MCG/KG/MIN: 10 INJECTION, EMULSION INTRAVENOUS at 05:41

## 2021-01-01 RX ADMIN — Medication 4 PUFF: at 00:26

## 2021-01-01 RX ADMIN — MEROPENEM 1000 MG: 1 INJECTION, POWDER, FOR SOLUTION INTRAVENOUS at 02:08

## 2021-01-01 RX ADMIN — ALBUTEROL SULFATE 4 PUFF: 90 AEROSOL, METERED RESPIRATORY (INHALATION) at 20:45

## 2021-01-01 RX ADMIN — LEVOTHYROXINE SODIUM 75 MCG: 75 TABLET ORAL at 06:48

## 2021-01-01 RX ADMIN — DOPAMINE HYDROCHLORIDE 11 MCG/KG/MIN: 160 INJECTION, SOLUTION INTRAVENOUS at 09:17

## 2021-01-01 RX ADMIN — INSULIN LISPRO 6 UNITS: 100 INJECTION, SOLUTION INTRAVENOUS; SUBCUTANEOUS at 18:24

## 2021-01-01 RX ADMIN — DOPAMINE HYDROCHLORIDE 9 MCG/KG/MIN: 160 INJECTION, SOLUTION INTRAVENOUS at 13:00

## 2021-01-01 RX ADMIN — FAMOTIDINE 20 MG: 10 INJECTION, SOLUTION INTRAVENOUS at 21:20

## 2021-01-01 RX ADMIN — VASOPRESSIN 0.04 UNITS/MIN: 20 INJECTION INTRAVENOUS at 13:14

## 2021-01-01 RX ADMIN — CEFEPIME 2 G: 2 INJECTION, POWDER, FOR SOLUTION INTRAVENOUS at 09:17

## 2021-01-01 RX ADMIN — INSULIN GLARGINE 50 UNITS: 100 INJECTION, SOLUTION SUBCUTANEOUS at 21:01

## 2021-01-01 RX ADMIN — ENOXAPARIN SODIUM 30 MG: 30 INJECTION SUBCUTANEOUS at 09:41

## 2021-01-01 RX ADMIN — INSULIN LISPRO 6 UNITS: 100 INJECTION, SOLUTION INTRAVENOUS; SUBCUTANEOUS at 06:52

## 2021-01-01 RX ADMIN — CALCIUM GLUCONATE 2000 MG: 98 INJECTION, SOLUTION INTRAVENOUS at 20:39

## 2021-01-01 RX ADMIN — CEFEPIME 2 G: 2 INJECTION, POWDER, FOR SOLUTION INTRAVENOUS at 21:01

## 2021-01-01 RX ADMIN — Medication 200 MCG/HR: at 11:30

## 2021-01-01 RX ADMIN — CHLORTHALIDONE 25 MG: 25 TABLET ORAL at 17:58

## 2021-01-01 RX ADMIN — IPRATROPIUM BROMIDE 4 PUFF: 17 AEROSOL, METERED RESPIRATORY (INHALATION) at 00:06

## 2021-01-01 RX ADMIN — INSULIN HUMAN 30 UNITS: 100 INJECTION, SUSPENSION SUBCUTANEOUS at 09:32

## 2021-01-01 RX ADMIN — ALBUTEROL SULFATE 4 PUFF: 90 AEROSOL, METERED RESPIRATORY (INHALATION) at 08:51

## 2021-01-01 RX ADMIN — DOPAMINE HYDROCHLORIDE 10 MCG/KG/MIN: 160 INJECTION, SOLUTION INTRAVENOUS at 18:18

## 2021-01-01 RX ADMIN — INSULIN LISPRO 6 UNITS: 100 INJECTION, SOLUTION INTRAVENOUS; SUBCUTANEOUS at 21:20

## 2021-01-01 RX ADMIN — ALBUTEROL SULFATE 4 PUFF: 90 AEROSOL, METERED RESPIRATORY (INHALATION) at 20:00

## 2021-01-01 RX ADMIN — ALBUTEROL SULFATE 4 PUFF: 90 AEROSOL, METERED RESPIRATORY (INHALATION) at 23:30

## 2021-01-01 RX ADMIN — MICONAZOLE NITRATE: 20 POWDER TOPICAL at 10:10

## 2021-01-01 RX ADMIN — INSULIN GLARGINE 60 UNITS: 100 INJECTION, SOLUTION SUBCUTANEOUS at 21:30

## 2021-01-01 RX ADMIN — INSULIN LISPRO 16 UNITS: 100 INJECTION, SOLUTION INTRAVENOUS; SUBCUTANEOUS at 14:13

## 2021-01-01 RX ADMIN — INSULIN GLARGINE 70 UNITS: 100 INJECTION, SOLUTION SUBCUTANEOUS at 22:33

## 2021-01-01 RX ADMIN — SODIUM CHLORIDE, PRESERVATIVE FREE 10 ML: 5 INJECTION INTRAVENOUS at 14:09

## 2021-01-01 RX ADMIN — ALBUTEROL SULFATE 4 PUFF: 90 AEROSOL, METERED RESPIRATORY (INHALATION) at 05:41

## 2021-01-01 RX ADMIN — EZETIMIBE 10 MG: 10 TABLET ORAL at 17:57

## 2021-01-01 RX ADMIN — CEFEPIME 2 G: 2 INJECTION, POWDER, FOR SOLUTION INTRAVENOUS at 21:21

## 2021-01-01 RX ADMIN — INSULIN LISPRO 20 UNITS: 100 INJECTION, SOLUTION INTRAVENOUS; SUBCUTANEOUS at 06:20

## 2021-01-01 RX ADMIN — MICONAZOLE NITRATE: 20 POWDER TOPICAL at 21:12

## 2021-01-01 RX ADMIN — ALBUTEROL SULFATE 4 PUFF: 90 AEROSOL, METERED RESPIRATORY (INHALATION) at 19:32

## 2021-01-01 RX ADMIN — Medication: at 01:49

## 2021-01-01 RX ADMIN — METOCLOPRAMIDE 10 MG: 5 INJECTION, SOLUTION INTRAMUSCULAR; INTRAVENOUS at 15:11

## 2021-01-01 RX ADMIN — EZETIMIBE 10 MG: 10 TABLET ORAL at 08:04

## 2021-01-01 RX ADMIN — METOCLOPRAMIDE 10 MG: 5 INJECTION, SOLUTION INTRAMUSCULAR; INTRAVENOUS at 06:38

## 2021-01-01 RX ADMIN — ACETAMINOPHEN 650 MG: 325 TABLET ORAL at 08:36

## 2021-01-01 RX ADMIN — PROPOFOL 60 MCG/KG/MIN: 10 INJECTION, EMULSION INTRAVENOUS at 06:53

## 2021-01-01 RX ADMIN — NOREPINEPHRINE BITARTRATE 20 MCG/MIN: 1 INJECTION, SOLUTION, CONCENTRATE INTRAVENOUS at 17:39

## 2021-01-01 RX ADMIN — CEFEPIME 2000 MG: 2 INJECTION, POWDER, FOR SOLUTION INTRAVENOUS at 18:40

## 2021-01-01 RX ADMIN — FUROSEMIDE 40 MG: 10 INJECTION, SOLUTION INTRAVENOUS at 20:19

## 2021-01-01 RX ADMIN — DOPAMINE HYDROCHLORIDE 6 MCG/KG/MIN: 160 INJECTION, SOLUTION INTRAVENOUS at 06:15

## 2021-01-01 RX ADMIN — CEFEPIME 2 G: 2 INJECTION, POWDER, FOR SOLUTION INTRAVENOUS at 09:38

## 2021-01-01 RX ADMIN — FENOFIBRATE 160 MG: 160 TABLET ORAL at 09:18

## 2021-01-01 RX ADMIN — ASPIRIN 81 MG CHEWABLE TABLET 81 MG: 81 TABLET CHEWABLE at 08:24

## 2021-01-01 RX ADMIN — CEFEPIME 2 G: 2 INJECTION, POWDER, FOR SOLUTION INTRAVENOUS at 21:07

## 2021-01-01 RX ADMIN — Medication: at 09:58

## 2021-01-01 RX ADMIN — METOCLOPRAMIDE 10 MG: 5 INJECTION, SOLUTION INTRAMUSCULAR; INTRAVENOUS at 12:28

## 2021-01-01 RX ADMIN — METHYLPREDNISOLONE SODIUM SUCCINATE 40 MG: 40 INJECTION, POWDER, FOR SOLUTION INTRAMUSCULAR; INTRAVENOUS at 10:22

## 2021-01-01 RX ADMIN — Medication 4 PUFF: at 08:15

## 2021-01-01 RX ADMIN — ATORVASTATIN CALCIUM 10 MG: 10 TABLET, FILM COATED ORAL at 10:39

## 2021-01-01 RX ADMIN — DEXAMETHASONE 6 MG: 4 TABLET ORAL at 17:58

## 2021-01-01 RX ADMIN — IPRATROPIUM BROMIDE 4 PUFF: 17 AEROSOL, METERED RESPIRATORY (INHALATION) at 12:09

## 2021-01-01 RX ADMIN — CEFEPIME 2 G: 2 INJECTION, POWDER, FOR SOLUTION INTRAVENOUS at 21:00

## 2021-01-01 RX ADMIN — ALBUTEROL SULFATE 4 PUFF: 90 AEROSOL, METERED RESPIRATORY (INHALATION) at 00:12

## 2021-01-01 RX ADMIN — PROPOFOL 30 MCG/KG/MIN: 10 INJECTION, EMULSION INTRAVENOUS at 00:43

## 2021-01-01 RX ADMIN — IPRATROPIUM BROMIDE 4 PUFF: 17 AEROSOL, METERED RESPIRATORY (INHALATION) at 15:40

## 2021-01-01 RX ADMIN — METHYLPREDNISOLONE SODIUM SUCCINATE 40 MG: 40 INJECTION, POWDER, FOR SOLUTION INTRAMUSCULAR; INTRAVENOUS at 16:09

## 2021-01-01 RX ADMIN — ASPIRIN 81 MG CHEWABLE TABLET 81 MG: 81 TABLET CHEWABLE at 09:23

## 2021-01-01 RX ADMIN — Medication 200 MCG/HR: at 22:20

## 2021-01-01 RX ADMIN — PROPOFOL 35 MCG/KG/MIN: 10 INJECTION, EMULSION INTRAVENOUS at 21:41

## 2021-01-01 RX ADMIN — PROPOFOL 80 MCG/KG/MIN: 10 INJECTION, EMULSION INTRAVENOUS at 03:01

## 2021-01-01 RX ADMIN — Medication 4 PUFF: at 19:36

## 2021-01-01 RX ADMIN — Medication 4 PUFF: at 03:47

## 2021-01-01 RX ADMIN — Medication 4 PUFF: at 15:47

## 2021-01-01 RX ADMIN — INSULIN LISPRO 6 UNITS: 100 INJECTION, SOLUTION INTRAVENOUS; SUBCUTANEOUS at 17:15

## 2021-01-01 RX ADMIN — Medication 1600 ML/HR: at 22:22

## 2021-01-01 RX ADMIN — REMDESIVIR 100 MG: 100 INJECTION, POWDER, LYOPHILIZED, FOR SOLUTION INTRAVENOUS at 01:05

## 2021-01-01 RX ADMIN — HEPARIN SODIUM 2600 UNITS: 1000 INJECTION INTRAVENOUS; SUBCUTANEOUS at 15:30

## 2021-01-01 RX ADMIN — INSULIN LISPRO 12 UNITS: 100 INJECTION, SOLUTION INTRAVENOUS; SUBCUTANEOUS at 00:47

## 2021-01-01 RX ADMIN — Medication 1600 ML/HR: at 16:00

## 2021-01-01 RX ADMIN — FAMOTIDINE 20 MG: 10 INJECTION, SOLUTION INTRAVENOUS at 20:19

## 2021-01-01 RX ADMIN — INSULIN LISPRO 6 UNITS: 100 INJECTION, SOLUTION INTRAVENOUS; SUBCUTANEOUS at 09:30

## 2021-01-01 RX ADMIN — IPRATROPIUM BROMIDE 4 PUFF: 17 AEROSOL, METERED RESPIRATORY (INHALATION) at 19:38

## 2021-01-01 RX ADMIN — APIXABAN 5 MG: 5 TABLET, FILM COATED ORAL at 10:47

## 2021-01-01 RX ADMIN — INSULIN LISPRO 6 UNITS: 100 INJECTION, SOLUTION INTRAVENOUS; SUBCUTANEOUS at 00:35

## 2021-01-01 RX ADMIN — GUAIFENESIN AND DEXTROMETHORPHAN 5 ML: 100; 10 SYRUP ORAL at 00:25

## 2021-01-01 RX ADMIN — DOPAMINE HYDROCHLORIDE 8 MCG/KG/MIN: 160 INJECTION, SOLUTION INTRAVENOUS at 15:42

## 2021-01-01 RX ADMIN — EPINEPHRINE 10 MCG/MIN: 1 INJECTION, SOLUTION, CONCENTRATE INTRAVENOUS at 05:58

## 2021-01-01 RX ADMIN — LEVOTHYROXINE SODIUM 75 MCG: 75 TABLET ORAL at 08:09

## 2021-01-01 RX ADMIN — Medication 25 MCG/HR: at 12:55

## 2021-01-01 RX ADMIN — ALBUTEROL SULFATE 4 PUFF: 90 AEROSOL, METERED RESPIRATORY (INHALATION) at 08:21

## 2021-01-01 RX ADMIN — ALBUTEROL SULFATE 4 PUFF: 90 AEROSOL, METERED RESPIRATORY (INHALATION) at 11:23

## 2021-01-01 RX ADMIN — ALBUMIN (HUMAN) 12.5 G: 0.25 INJECTION, SOLUTION INTRAVENOUS at 05:30

## 2021-01-01 RX ADMIN — LEVOTHYROXINE SODIUM 75 MCG: 75 TABLET ORAL at 06:33

## 2021-01-01 RX ADMIN — PANTOPRAZOLE SODIUM 40 MG: 40 TABLET, DELAYED RELEASE ORAL at 05:10

## 2021-01-01 RX ADMIN — PROPOFOL 60 MCG/KG/MIN: 10 INJECTION, EMULSION INTRAVENOUS at 03:48

## 2021-01-01 RX ADMIN — INSULIN GLARGINE 50 UNITS: 100 INJECTION, SOLUTION SUBCUTANEOUS at 23:03

## 2021-01-01 RX ADMIN — DOPAMINE HYDROCHLORIDE 13 MCG/KG/MIN: 160 INJECTION, SOLUTION INTRAVENOUS at 15:06

## 2021-01-01 RX ADMIN — PROPOFOL 50 MCG/KG/MIN: 10 INJECTION, EMULSION INTRAVENOUS at 10:57

## 2021-01-01 RX ADMIN — METHYLPREDNISOLONE SODIUM SUCCINATE 40 MG: 40 INJECTION, POWDER, FOR SOLUTION INTRAMUSCULAR; INTRAVENOUS at 20:35

## 2021-01-01 RX ADMIN — EZETIMIBE 10 MG: 10 TABLET ORAL at 09:41

## 2021-01-01 RX ADMIN — METHYLPREDNISOLONE SODIUM SUCCINATE 40 MG: 40 INJECTION, POWDER, FOR SOLUTION INTRAMUSCULAR; INTRAVENOUS at 16:05

## 2021-01-01 RX ADMIN — TRAMADOL HYDROCHLORIDE 50 MG: 50 TABLET, FILM COATED ORAL at 05:06

## 2021-01-01 RX ADMIN — ALBUTEROL SULFATE 4 PUFF: 90 AEROSOL, METERED RESPIRATORY (INHALATION) at 07:25

## 2021-01-01 RX ADMIN — INSULIN LISPRO 6 UNITS: 100 INJECTION, SOLUTION INTRAVENOUS; SUBCUTANEOUS at 21:04

## 2021-01-01 RX ADMIN — INSULIN LISPRO 12 UNITS: 100 INJECTION, SOLUTION INTRAVENOUS; SUBCUTANEOUS at 16:30

## 2021-01-01 RX ADMIN — PROPOFOL 55 MCG/KG/MIN: 10 INJECTION, EMULSION INTRAVENOUS at 20:05

## 2021-01-01 RX ADMIN — ASPIRIN 81 MG CHEWABLE TABLET 81 MG: 81 TABLET CHEWABLE at 08:45

## 2021-01-01 RX ADMIN — HEPARIN SODIUM 5000 UNITS: 5000 INJECTION INTRAVENOUS; SUBCUTANEOUS at 05:53

## 2021-01-01 RX ADMIN — DEXAMETHASONE 6 MG: 4 TABLET ORAL at 09:23

## 2021-01-01 RX ADMIN — MIDAZOLAM HYDROCHLORIDE 10 MG/HR: 5 INJECTION, SOLUTION INTRAMUSCULAR; INTRAVENOUS at 15:22

## 2021-01-01 RX ADMIN — MIDAZOLAM HYDROCHLORIDE 10 MG/HR: 5 INJECTION, SOLUTION INTRAMUSCULAR; INTRAVENOUS at 05:40

## 2021-01-01 RX ADMIN — ALBUTEROL SULFATE 4 PUFF: 90 AEROSOL, METERED RESPIRATORY (INHALATION) at 16:30

## 2021-01-01 RX ADMIN — PROPOFOL 59.96 MCG/KG/MIN: 10 INJECTION, EMULSION INTRAVENOUS at 14:30

## 2021-01-01 RX ADMIN — ALBUTEROL SULFATE 4 PUFF: 90 AEROSOL, METERED RESPIRATORY (INHALATION) at 15:21

## 2021-01-01 RX ADMIN — DOPAMINE HYDROCHLORIDE 12.5 MCG/KG/MIN: 160 INJECTION, SOLUTION INTRAVENOUS at 08:16

## 2021-01-01 RX ADMIN — MESALAMINE 800 MG: 400 CAPSULE, DELAYED RELEASE ORAL at 14:25

## 2021-01-01 RX ADMIN — Medication 4 PUFF: at 17:06

## 2021-01-01 RX ADMIN — CHLORHEXIDINE GLUCONATE 0.12% ORAL RINSE 15 ML: 1.2 LIQUID ORAL at 09:21

## 2021-01-01 RX ADMIN — METOCLOPRAMIDE 10 MG: 5 INJECTION, SOLUTION INTRAMUSCULAR; INTRAVENOUS at 21:06

## 2021-01-01 RX ADMIN — Medication 75 MCG/HR: at 14:40

## 2021-01-01 RX ADMIN — APIXABAN 5 MG: 5 TABLET, FILM COATED ORAL at 08:09

## 2021-01-01 RX ADMIN — DOPAMINE HYDROCHLORIDE 7.5 MCG/KG/MIN: 160 INJECTION, SOLUTION INTRAVENOUS at 08:18

## 2021-01-01 RX ADMIN — Medication: at 15:08

## 2021-01-01 RX ADMIN — DOPAMINE HYDROCHLORIDE 8 MCG/KG/MIN: 160 INJECTION, SOLUTION INTRAVENOUS at 19:42

## 2021-01-01 RX ADMIN — SODIUM CHLORIDE, PRESERVATIVE FREE 10 ML: 5 INJECTION INTRAVENOUS at 09:30

## 2021-01-01 RX ADMIN — Medication 4 PUFF: at 07:50

## 2021-01-01 RX ADMIN — Medication 100 MCG/HR: at 16:50

## 2021-01-01 RX ADMIN — ALBUTEROL SULFATE 4 PUFF: 90 AEROSOL, METERED RESPIRATORY (INHALATION) at 00:48

## 2021-01-01 RX ADMIN — PROPOFOL 25 MCG/KG/MIN: 10 INJECTION, EMULSION INTRAVENOUS at 11:28

## 2021-01-01 RX ADMIN — FAMOTIDINE 20 MG: 10 INJECTION, SOLUTION INTRAVENOUS at 08:45

## 2021-01-01 RX ADMIN — CHLORHEXIDINE GLUCONATE 0.12% ORAL RINSE 15 ML: 1.2 LIQUID ORAL at 09:36

## 2021-01-01 RX ADMIN — Medication: at 02:19

## 2021-01-01 RX ADMIN — IPRATROPIUM BROMIDE 4 PUFF: 17 AEROSOL, METERED RESPIRATORY (INHALATION) at 08:05

## 2021-01-01 RX ADMIN — APIXABAN 5 MG: 5 TABLET, FILM COATED ORAL at 21:00

## 2021-01-01 RX ADMIN — REMDESIVIR 100 MG: 100 INJECTION, POWDER, LYOPHILIZED, FOR SOLUTION INTRAVENOUS at 01:59

## 2021-01-01 RX ADMIN — CHLORHEXIDINE GLUCONATE 0.12% ORAL RINSE 15 ML: 1.2 LIQUID ORAL at 21:00

## 2021-01-01 RX ADMIN — PROPOFOL 40 MCG/KG/MIN: 10 INJECTION, EMULSION INTRAVENOUS at 23:41

## 2021-01-01 RX ADMIN — FAMOTIDINE 20 MG: 10 INJECTION, SOLUTION INTRAVENOUS at 09:31

## 2021-01-01 RX ADMIN — ACETAMINOPHEN 650 MG: 325 TABLET ORAL at 02:21

## 2021-01-01 RX ADMIN — METOCLOPRAMIDE 10 MG: 5 INJECTION, SOLUTION INTRAMUSCULAR; INTRAVENOUS at 07:00

## 2021-01-01 RX ADMIN — IPRATROPIUM BROMIDE 4 PUFF: 17 AEROSOL, METERED RESPIRATORY (INHALATION) at 16:18

## 2021-01-01 RX ADMIN — ALBUTEROL SULFATE 4 PUFF: 90 AEROSOL, METERED RESPIRATORY (INHALATION) at 00:22

## 2021-01-01 RX ADMIN — PROPOFOL 60 MCG/KG/MIN: 10 INJECTION, EMULSION INTRAVENOUS at 09:56

## 2021-01-01 RX ADMIN — Medication: at 00:36

## 2021-01-01 RX ADMIN — METOCLOPRAMIDE 10 MG: 5 INJECTION, SOLUTION INTRAMUSCULAR; INTRAVENOUS at 06:30

## 2021-01-01 RX ADMIN — Medication: at 01:15

## 2021-01-01 RX ADMIN — Medication 50 MCG/HR: at 01:22

## 2021-01-01 RX ADMIN — NOREPINEPHRINE BITARTRATE 30 MCG/MIN: 1 INJECTION, SOLUTION, CONCENTRATE INTRAVENOUS at 11:14

## 2021-01-01 RX ADMIN — PROPOFOL 60 MCG/KG/MIN: 10 INJECTION, EMULSION INTRAVENOUS at 15:45

## 2021-01-01 RX ADMIN — SODIUM CHLORIDE, PRESERVATIVE FREE 10 ML: 5 INJECTION INTRAVENOUS at 10:12

## 2021-01-01 RX ADMIN — METHYLPREDNISOLONE SODIUM SUCCINATE 40 MG: 40 INJECTION, POWDER, FOR SOLUTION INTRAMUSCULAR; INTRAVENOUS at 20:20

## 2021-01-01 RX ADMIN — MEROPENEM 1000 MG: 1 INJECTION, POWDER, FOR SOLUTION INTRAVENOUS at 02:12

## 2021-01-01 RX ADMIN — SODIUM CHLORIDE, PRESERVATIVE FREE 10 ML: 5 INJECTION INTRAVENOUS at 10:11

## 2021-01-01 RX ADMIN — FAMOTIDINE 20 MG: 10 INJECTION, SOLUTION INTRAVENOUS at 20:36

## 2021-01-01 RX ADMIN — CHLORHEXIDINE GLUCONATE 0.12% ORAL RINSE 15 ML: 1.2 LIQUID ORAL at 09:54

## 2021-01-01 RX ADMIN — PROPOFOL 25 MCG/KG/MIN: 10 INJECTION, EMULSION INTRAVENOUS at 23:35

## 2021-01-01 RX ADMIN — ALBUTEROL SULFATE 4 PUFF: 90 AEROSOL, METERED RESPIRATORY (INHALATION) at 04:12

## 2021-01-01 RX ADMIN — VASOPRESSIN 0.03 UNITS/MIN: 20 INJECTION INTRAVENOUS at 05:00

## 2021-01-01 RX ADMIN — PROPOFOL 70 MCG/KG/MIN: 10 INJECTION, EMULSION INTRAVENOUS at 15:48

## 2021-01-01 RX ADMIN — MEROPENEM 1000 MG: 1 INJECTION, POWDER, FOR SOLUTION INTRAVENOUS at 15:11

## 2021-01-01 RX ADMIN — SODIUM CHLORIDE, PRESERVATIVE FREE 10 ML: 5 INJECTION INTRAVENOUS at 09:58

## 2021-01-01 RX ADMIN — ALBUTEROL SULFATE 4 PUFF: 90 AEROSOL, METERED RESPIRATORY (INHALATION) at 16:50

## 2021-01-01 RX ADMIN — FAMOTIDINE 20 MG: 10 INJECTION, SOLUTION INTRAVENOUS at 21:00

## 2021-01-01 RX ADMIN — FAMOTIDINE 20 MG: 10 INJECTION, SOLUTION INTRAVENOUS at 20:57

## 2021-01-01 RX ADMIN — CEFEPIME 2 G: 2 INJECTION, POWDER, FOR SOLUTION INTRAVENOUS at 09:54

## 2021-01-01 RX ADMIN — SODIUM CHLORIDE, PRESERVATIVE FREE 10 ML: 5 INJECTION INTRAVENOUS at 20:35

## 2021-01-01 RX ADMIN — PROPOFOL 60 MCG/KG/MIN: 10 INJECTION, EMULSION INTRAVENOUS at 12:26

## 2021-01-01 RX ADMIN — Medication 4 PUFF: at 04:12

## 2021-01-01 RX ADMIN — METOCLOPRAMIDE 10 MG: 5 INJECTION, SOLUTION INTRAMUSCULAR; INTRAVENOUS at 16:09

## 2021-01-01 RX ADMIN — SODIUM CHLORIDE, PRESERVATIVE FREE 10 ML: 5 INJECTION INTRAVENOUS at 10:06

## 2021-01-01 RX ADMIN — INSULIN LISPRO 6 UNITS: 100 INJECTION, SOLUTION INTRAVENOUS; SUBCUTANEOUS at 10:00

## 2021-01-01 RX ADMIN — Medication 1600 ML/HR: at 09:52

## 2021-01-01 RX ADMIN — METOCLOPRAMIDE 10 MG: 5 INJECTION, SOLUTION INTRAMUSCULAR; INTRAVENOUS at 15:04

## 2021-01-01 RX ADMIN — SODIUM CHLORIDE, PRESERVATIVE FREE 10 ML: 5 INJECTION INTRAVENOUS at 14:41

## 2021-01-01 RX ADMIN — Medication 1600 ML/HR: at 20:22

## 2021-01-01 RX ADMIN — HEPARIN SODIUM 5000 UNITS: 5000 INJECTION INTRAVENOUS; SUBCUTANEOUS at 21:20

## 2021-01-01 RX ADMIN — ALBUTEROL SULFATE 4 PUFF: 90 AEROSOL, METERED RESPIRATORY (INHALATION) at 19:36

## 2021-01-01 RX ADMIN — Medication 1600 ML/HR: at 20:45

## 2021-01-01 RX ADMIN — DOPAMINE HYDROCHLORIDE 10 MCG/KG/MIN: 160 INJECTION, SOLUTION INTRAVENOUS at 05:51

## 2021-01-01 RX ADMIN — IPRATROPIUM BROMIDE 4 PUFF: 17 AEROSOL, METERED RESPIRATORY (INHALATION) at 05:41

## 2021-01-01 RX ADMIN — VANCOMYCIN HYDROCHLORIDE 2000 MG: 1 INJECTION, POWDER, LYOPHILIZED, FOR SOLUTION INTRAVENOUS at 13:30

## 2021-01-01 RX ADMIN — DEXAMETHASONE 6 MG: 4 TABLET ORAL at 09:42

## 2021-01-01 RX ADMIN — Medication 5 MCG/HR: at 01:28

## 2021-01-01 RX ADMIN — INSULIN LISPRO 12 UNITS: 100 INJECTION, SOLUTION INTRAVENOUS; SUBCUTANEOUS at 10:27

## 2021-01-01 RX ADMIN — INSULIN GLARGINE 35 UNITS: 100 INJECTION, SOLUTION SUBCUTANEOUS at 20:50

## 2021-01-01 RX ADMIN — ALBUTEROL SULFATE 4 PUFF: 90 AEROSOL, METERED RESPIRATORY (INHALATION) at 20:09

## 2021-01-01 RX ADMIN — DEXAMETHASONE SODIUM PHOSPHATE 6 MG: 4 INJECTION, SOLUTION INTRAMUSCULAR; INTRAVENOUS at 02:35

## 2021-01-01 RX ADMIN — Medication: at 19:23

## 2021-01-01 RX ADMIN — Medication 100 MCG/HR: at 17:26

## 2021-01-01 RX ADMIN — Medication 1600 ML/HR: at 02:20

## 2021-01-01 RX ADMIN — Medication 4 PUFF: at 11:06

## 2021-01-01 RX ADMIN — ALBUTEROL SULFATE 4 PUFF: 90 AEROSOL, METERED RESPIRATORY (INHALATION) at 08:30

## 2021-01-01 RX ADMIN — METHYLPREDNISOLONE SODIUM SUCCINATE 40 MG: 40 INJECTION, POWDER, FOR SOLUTION INTRAMUSCULAR; INTRAVENOUS at 20:30

## 2021-01-01 RX ADMIN — DEXAMETHASONE 6 MG: 4 TABLET ORAL at 09:18

## 2021-01-01 RX ADMIN — MEROPENEM 1000 MG: 1 INJECTION, POWDER, FOR SOLUTION INTRAVENOUS at 10:25

## 2021-01-01 RX ADMIN — PROPOFOL 60 MCG/KG/MIN: 10 INJECTION, EMULSION INTRAVENOUS at 00:38

## 2021-01-01 RX ADMIN — FOLIC ACID 1 MG: 1 TABLET ORAL at 08:09

## 2021-01-01 RX ADMIN — INSULIN LISPRO 12 UNITS: 100 INJECTION, SOLUTION INTRAVENOUS; SUBCUTANEOUS at 05:03

## 2021-01-01 RX ADMIN — SODIUM CHLORIDE, PRESERVATIVE FREE 10 ML: 5 INJECTION INTRAVENOUS at 11:58

## 2021-01-01 RX ADMIN — MICONAZOLE NITRATE: 20 POWDER TOPICAL at 14:25

## 2021-01-01 RX ADMIN — MICONAZOLE NITRATE: 20 POWDER TOPICAL at 08:25

## 2021-01-01 RX ADMIN — METOCLOPRAMIDE 10 MG: 5 INJECTION, SOLUTION INTRAMUSCULAR; INTRAVENOUS at 06:54

## 2021-01-01 RX ADMIN — SODIUM CHLORIDE, PRESERVATIVE FREE 10 ML: 5 INJECTION INTRAVENOUS at 08:56

## 2021-01-01 RX ADMIN — MIDAZOLAM HYDROCHLORIDE 10 MG/HR: 5 INJECTION, SOLUTION INTRAMUSCULAR; INTRAVENOUS at 02:23

## 2021-01-01 RX ADMIN — LEVOTHYROXINE SODIUM 75 MCG: 75 TABLET ORAL at 07:00

## 2021-01-01 RX ADMIN — FOLIC ACID 1 MG: 1 TABLET ORAL at 09:25

## 2021-01-01 RX ADMIN — Medication 4 PUFF: at 12:05

## 2021-01-01 RX ADMIN — SODIUM CHLORIDE, PRESERVATIVE FREE 10 ML: 5 INJECTION INTRAVENOUS at 08:10

## 2021-01-01 RX ADMIN — Medication: at 05:30

## 2021-01-01 RX ADMIN — CHLORHEXIDINE GLUCONATE 0.12% ORAL RINSE 15 ML: 1.2 LIQUID ORAL at 10:22

## 2021-01-01 RX ADMIN — PROPOFOL 50 MCG/KG/MIN: 10 INJECTION, EMULSION INTRAVENOUS at 13:58

## 2021-01-01 RX ADMIN — NOREPINEPHRINE BITARTRATE 2 MCG/MIN: 1 INJECTION, SOLUTION, CONCENTRATE INTRAVENOUS at 16:34

## 2021-01-01 RX ADMIN — DOPAMINE HYDROCHLORIDE 8 MCG/KG/MIN: 160 INJECTION, SOLUTION INTRAVENOUS at 13:56

## 2021-01-01 RX ADMIN — NOREPINEPHRINE BITARTRATE 21 MCG/MIN: 1 INJECTION, SOLUTION, CONCENTRATE INTRAVENOUS at 12:28

## 2021-01-01 RX ADMIN — ALBUTEROL SULFATE 4 PUFF: 90 AEROSOL, METERED RESPIRATORY (INHALATION) at 23:59

## 2021-01-01 RX ADMIN — SODIUM CHLORIDE, PRESERVATIVE FREE 10 ML: 5 INJECTION INTRAVENOUS at 02:10

## 2021-01-01 RX ADMIN — PROPOFOL 60 MCG/KG/MIN: 10 INJECTION, EMULSION INTRAVENOUS at 02:37

## 2021-01-01 RX ADMIN — METOCLOPRAMIDE 10 MG: 5 INJECTION, SOLUTION INTRAMUSCULAR; INTRAVENOUS at 21:33

## 2021-01-01 RX ADMIN — INSULIN LISPRO 8 UNITS: 100 INJECTION, SOLUTION INTRAVENOUS; SUBCUTANEOUS at 05:13

## 2021-01-01 RX ADMIN — HEPARIN SODIUM 5000 UNITS: 5000 INJECTION INTRAVENOUS; SUBCUTANEOUS at 06:21

## 2021-01-01 RX ADMIN — Medication 1600 ML/HR: at 13:00

## 2021-01-01 RX ADMIN — ALBUTEROL SULFATE 4 PUFF: 90 AEROSOL, METERED RESPIRATORY (INHALATION) at 19:33

## 2021-01-01 RX ADMIN — Medication 200 MCG/HR: at 04:04

## 2021-01-01 RX ADMIN — INSULIN LISPRO 6 UNITS: 100 INJECTION, SOLUTION INTRAVENOUS; SUBCUTANEOUS at 01:23

## 2021-01-01 RX ADMIN — MINOCYCLINE HYDROCHLORIDE 100 MG: 100 CAPSULE ORAL at 08:24

## 2021-01-01 RX ADMIN — MORPHINE SULFATE 1 MG: 4 INJECTION, SOLUTION INTRAMUSCULAR; INTRAVENOUS at 16:07

## 2021-01-01 RX ADMIN — DOPAMINE HYDROCHLORIDE 11.5 MCG/KG/MIN: 160 INJECTION, SOLUTION INTRAVENOUS at 05:10

## 2021-01-01 RX ADMIN — PROPOFOL 30 MCG/KG/MIN: 10 INJECTION, EMULSION INTRAVENOUS at 09:40

## 2021-01-01 RX ADMIN — HEPARIN SODIUM 5000 UNITS: 5000 INJECTION INTRAVENOUS; SUBCUTANEOUS at 14:39

## 2021-01-01 RX ADMIN — MICONAZOLE NITRATE: 20 POWDER TOPICAL at 10:13

## 2021-01-01 RX ADMIN — SODIUM CHLORIDE, PRESERVATIVE FREE 10 ML: 5 INJECTION INTRAVENOUS at 21:33

## 2021-01-01 RX ADMIN — HEPARIN SODIUM: 5000 INJECTION, SOLUTION INTRAVENOUS; SUBCUTANEOUS at 19:24

## 2021-01-01 RX ADMIN — CHLORHEXIDINE GLUCONATE 0.12% ORAL RINSE 15 ML: 1.2 LIQUID ORAL at 20:18

## 2021-01-01 RX ADMIN — SODIUM CHLORIDE 2 MCG/KG/MIN: 9 INJECTION, SOLUTION INTRAVENOUS at 17:05

## 2021-01-01 RX ADMIN — Medication 4 PUFF: at 15:20

## 2021-01-01 RX ADMIN — METOCLOPRAMIDE 10 MG: 5 INJECTION, SOLUTION INTRAMUSCULAR; INTRAVENOUS at 14:45

## 2021-01-01 RX ADMIN — ASPIRIN 324 MG: 81 TABLET, CHEWABLE ORAL at 19:54

## 2021-01-01 RX ADMIN — DOPAMINE HYDROCHLORIDE 10 MCG/KG/MIN: 160 INJECTION, SOLUTION INTRAVENOUS at 19:00

## 2021-01-01 RX ADMIN — INSULIN LISPRO 6 UNITS: 100 INJECTION, SOLUTION INTRAVENOUS; SUBCUTANEOUS at 05:48

## 2021-01-01 RX ADMIN — METHYLPREDNISOLONE SODIUM SUCCINATE 40 MG: 40 INJECTION, POWDER, FOR SOLUTION INTRAMUSCULAR; INTRAVENOUS at 14:09

## 2021-01-01 RX ADMIN — ALBUTEROL SULFATE 4 PUFF: 90 AEROSOL, METERED RESPIRATORY (INHALATION) at 00:05

## 2021-01-01 RX ADMIN — Medication: at 22:07

## 2021-01-01 RX ADMIN — DOPAMINE HYDROCHLORIDE 13 MCG/KG/MIN: 160 INJECTION, SOLUTION INTRAVENOUS at 13:24

## 2021-01-01 RX ADMIN — Medication 1600 ML/HR: at 23:36

## 2021-01-01 RX ADMIN — LOSARTAN POTASSIUM 100 MG: 50 TABLET, FILM COATED ORAL at 09:42

## 2021-01-01 RX ADMIN — SODIUM CHLORIDE, PRESERVATIVE FREE 10 ML: 5 INJECTION INTRAVENOUS at 09:23

## 2021-01-01 RX ADMIN — PROPOFOL 60 MCG/KG/MIN: 10 INJECTION, EMULSION INTRAVENOUS at 15:34

## 2021-01-01 RX ADMIN — SODIUM CHLORIDE 3 MCG/KG/MIN: 9 INJECTION, SOLUTION INTRAVENOUS at 01:45

## 2021-01-01 RX ADMIN — ALBUTEROL SULFATE 4 PUFF: 90 AEROSOL, METERED RESPIRATORY (INHALATION) at 13:15

## 2021-01-01 RX ADMIN — Medication: at 22:33

## 2021-01-01 RX ADMIN — INSULIN LISPRO 18 UNITS: 100 INJECTION, SOLUTION INTRAVENOUS; SUBCUTANEOUS at 00:21

## 2021-01-01 RX ADMIN — SODIUM CHLORIDE, PRESERVATIVE FREE 10 ML: 5 INJECTION INTRAVENOUS at 08:37

## 2021-01-01 RX ADMIN — PROPOFOL 20 MCG/KG/MIN: 10 INJECTION, EMULSION INTRAVENOUS at 14:55

## 2021-01-01 RX ADMIN — DOPAMINE HYDROCHLORIDE 10 MCG/KG/MIN: 160 INJECTION, SOLUTION INTRAVENOUS at 08:46

## 2021-01-01 RX ADMIN — CALCIUM GLUCONATE 2000 MG: 98 INJECTION, SOLUTION INTRAVENOUS at 20:29

## 2021-01-01 RX ADMIN — PROPOFOL 25 MCG/KG/MIN: 10 INJECTION, EMULSION INTRAVENOUS at 18:29

## 2021-01-01 RX ADMIN — CEFEPIME 2 G: 2 INJECTION, POWDER, FOR SOLUTION INTRAVENOUS at 13:15

## 2021-01-01 RX ADMIN — Medication 200 MCG/HR: at 16:51

## 2021-01-01 RX ADMIN — MICONAZOLE NITRATE: 20 POWDER TOPICAL at 08:57

## 2021-01-01 RX ADMIN — DOPAMINE HYDROCHLORIDE 10 MCG/KG/MIN: 160 INJECTION, SOLUTION INTRAVENOUS at 23:27

## 2021-01-01 RX ADMIN — CHLORHEXIDINE GLUCONATE 0.12% ORAL RINSE 15 ML: 1.2 LIQUID ORAL at 21:31

## 2021-01-01 RX ADMIN — SODIUM CHLORIDE, PRESERVATIVE FREE 10 ML: 5 INJECTION INTRAVENOUS at 21:23

## 2021-01-01 RX ADMIN — Medication 1600 ML/HR: at 08:54

## 2021-01-01 RX ADMIN — SODIUM CHLORIDE 3 MCG/KG/MIN: 9 INJECTION, SOLUTION INTRAVENOUS at 02:16

## 2021-01-01 RX ADMIN — METHYLPREDNISOLONE SODIUM SUCCINATE 40 MG: 40 INJECTION, POWDER, FOR SOLUTION INTRAMUSCULAR; INTRAVENOUS at 12:28

## 2021-01-01 RX ADMIN — PROPOFOL 30 MCG/KG/MIN: 10 INJECTION, EMULSION INTRAVENOUS at 04:49

## 2021-01-01 RX ADMIN — METHYLPREDNISOLONE SODIUM SUCCINATE 40 MG: 40 INJECTION, POWDER, FOR SOLUTION INTRAMUSCULAR; INTRAVENOUS at 14:39

## 2021-01-01 RX ADMIN — INSULIN GLARGINE 50 UNITS: 100 INJECTION, SOLUTION SUBCUTANEOUS at 21:43

## 2021-01-01 RX ADMIN — FAMOTIDINE 20 MG: 10 INJECTION, SOLUTION INTRAVENOUS at 21:22

## 2021-01-01 RX ADMIN — Medication 200 MCG/HR: at 13:37

## 2021-01-01 RX ADMIN — INSULIN LISPRO 18 UNITS: 100 INJECTION, SOLUTION INTRAVENOUS; SUBCUTANEOUS at 17:13

## 2021-01-01 RX ADMIN — SODIUM CHLORIDE, PRESERVATIVE FREE 10 ML: 5 INJECTION INTRAVENOUS at 21:32

## 2021-01-01 RX ADMIN — HEPARIN SODIUM 5000 UNITS: 5000 INJECTION INTRAVENOUS; SUBCUTANEOUS at 21:45

## 2021-01-01 RX ADMIN — SODIUM CHLORIDE, PRESERVATIVE FREE 10 ML: 5 INJECTION INTRAVENOUS at 08:05

## 2021-01-01 RX ADMIN — HEPARIN SODIUM 5000 UNITS: 5000 INJECTION INTRAVENOUS; SUBCUTANEOUS at 22:18

## 2021-01-01 RX ADMIN — FAMOTIDINE 20 MG: 10 INJECTION, SOLUTION INTRAVENOUS at 08:10

## 2021-01-01 RX ADMIN — Medication 1600 ML/HR: at 09:55

## 2021-01-01 RX ADMIN — IOPAMIDOL 100 ML: 755 INJECTION, SOLUTION INTRAVENOUS at 16:02

## 2021-01-01 RX ADMIN — Medication 75 MCG/HR: at 08:28

## 2021-01-01 RX ADMIN — INSULIN LISPRO 24 UNITS: 100 INJECTION, SOLUTION INTRAVENOUS; SUBCUTANEOUS at 14:46

## 2021-01-01 RX ADMIN — Medication 100 MCG/HR: at 05:23

## 2021-01-01 RX ADMIN — SODIUM CHLORIDE, PRESERVATIVE FREE 10 ML: 5 INJECTION INTRAVENOUS at 21:16

## 2021-01-01 RX ADMIN — FOLIC ACID 1 MG: 1 TABLET ORAL at 09:42

## 2021-01-01 RX ADMIN — PROPOFOL 60 MCG/KG/MIN: 10 INJECTION, EMULSION INTRAVENOUS at 06:15

## 2021-01-01 RX ADMIN — ALBUTEROL SULFATE 4 PUFF: 90 AEROSOL, METERED RESPIRATORY (INHALATION) at 20:16

## 2021-01-01 RX ADMIN — FAMOTIDINE 20 MG: 10 INJECTION, SOLUTION INTRAVENOUS at 09:59

## 2021-01-01 RX ADMIN — FAMOTIDINE 20 MG: 10 INJECTION, SOLUTION INTRAVENOUS at 20:35

## 2021-01-01 RX ADMIN — ACETAMINOPHEN 650 MG: 650 SUPPOSITORY RECTAL at 04:55

## 2021-01-01 RX ADMIN — FAMOTIDINE 20 MG: 10 INJECTION, SOLUTION INTRAVENOUS at 08:57

## 2021-01-01 RX ADMIN — METOCLOPRAMIDE 10 MG: 5 INJECTION, SOLUTION INTRAMUSCULAR; INTRAVENOUS at 21:20

## 2021-01-01 RX ADMIN — DOPAMINE HYDROCHLORIDE 8 MCG/KG/MIN: 160 INJECTION, SOLUTION INTRAVENOUS at 15:45

## 2021-01-01 RX ADMIN — DOPAMINE HYDROCHLORIDE 8 MCG/KG/MIN: 160 INJECTION, SOLUTION INTRAVENOUS at 21:37

## 2021-01-01 RX ADMIN — MINOCYCLINE HYDROCHLORIDE 100 MG: 100 CAPSULE ORAL at 14:37

## 2021-01-01 RX ADMIN — FUROSEMIDE 40 MG: 10 INJECTION, SOLUTION INTRAVENOUS at 10:22

## 2021-01-01 RX ADMIN — DOPAMINE HYDROCHLORIDE 8 MCG/KG/MIN: 160 INJECTION, SOLUTION INTRAVENOUS at 03:15

## 2021-01-01 RX ADMIN — SODIUM CHLORIDE, PRESERVATIVE FREE 10 ML: 5 INJECTION INTRAVENOUS at 22:00

## 2021-01-01 RX ADMIN — ATORVASTATIN CALCIUM 10 MG: 10 TABLET, FILM COATED ORAL at 08:09

## 2021-01-01 RX ADMIN — METHYLPREDNISOLONE SODIUM SUCCINATE 40 MG: 40 INJECTION, POWDER, FOR SOLUTION INTRAMUSCULAR; INTRAVENOUS at 21:06

## 2021-01-01 RX ADMIN — FOLIC ACID 1 MG: 1 TABLET ORAL at 17:58

## 2021-01-01 RX ADMIN — SODIUM CHLORIDE, PRESERVATIVE FREE 10 ML: 5 INJECTION INTRAVENOUS at 08:27

## 2021-01-01 RX ADMIN — EZETIMIBE 10 MG: 10 TABLET ORAL at 10:39

## 2021-01-01 RX ADMIN — AMLODIPINE BESYLATE 10 MG: 10 TABLET ORAL at 17:57

## 2021-01-01 RX ADMIN — Medication 4 PUFF: at 19:32

## 2021-01-01 RX ADMIN — Medication 4 PUFF: at 16:51

## 2021-01-01 RX ADMIN — CEFEPIME 2000 MG: 2 INJECTION, POWDER, FOR SOLUTION INTRAVENOUS at 10:58

## 2021-01-01 RX ADMIN — PANTOPRAZOLE SODIUM 40 MG: 40 TABLET, DELAYED RELEASE ORAL at 06:17

## 2021-01-01 RX ADMIN — ALBUTEROL SULFATE 4 PUFF: 90 AEROSOL, METERED RESPIRATORY (INHALATION) at 20:27

## 2021-01-01 RX ADMIN — CHLORHEXIDINE GLUCONATE 0.12% ORAL RINSE 15 ML: 1.2 LIQUID ORAL at 10:01

## 2021-01-01 RX ADMIN — HEPARIN SODIUM 13.5 UNITS/KG/HR: 10000 INJECTION, SOLUTION INTRAVENOUS at 14:55

## 2021-01-01 RX ADMIN — Medication 4 PUFF: at 03:58

## 2021-01-01 RX ADMIN — ALBUTEROL SULFATE 4 PUFF: 90 AEROSOL, METERED RESPIRATORY (INHALATION) at 04:26

## 2021-01-01 RX ADMIN — ALBUTEROL SULFATE 4 PUFF: 90 AEROSOL, METERED RESPIRATORY (INHALATION) at 08:14

## 2021-01-01 RX ADMIN — FOLIC ACID 1 MG: 1 TABLET ORAL at 09:18

## 2021-01-01 RX ADMIN — ACETAMINOPHEN 650 MG: 650 SUPPOSITORY RECTAL at 22:32

## 2021-01-01 RX ADMIN — Medication: at 16:00

## 2021-01-01 RX ADMIN — HEPARIN SODIUM 2600 UNITS: 1000 INJECTION INTRAVENOUS; SUBCUTANEOUS at 18:05

## 2021-01-01 RX ADMIN — INSULIN LISPRO 12 UNITS: 100 INJECTION, SOLUTION INTRAVENOUS; SUBCUTANEOUS at 01:32

## 2021-01-01 RX ADMIN — Medication: at 09:18

## 2021-01-01 RX ADMIN — CHLORHEXIDINE GLUCONATE 0.12% ORAL RINSE 15 ML: 1.2 LIQUID ORAL at 20:36

## 2021-01-01 RX ADMIN — Medication 4 PUFF: at 12:23

## 2021-01-01 RX ADMIN — Medication 4 PUFF: at 13:15

## 2021-01-01 RX ADMIN — FAMOTIDINE 20 MG: 10 INJECTION, SOLUTION INTRAVENOUS at 20:54

## 2021-01-01 RX ADMIN — MICONAZOLE NITRATE: 20 POWDER TOPICAL at 20:55

## 2021-01-01 RX ADMIN — ALBUTEROL SULFATE 4 PUFF: 90 AEROSOL, METERED RESPIRATORY (INHALATION) at 08:05

## 2021-01-01 RX ADMIN — SODIUM CHLORIDE, PRESERVATIVE FREE 10 ML: 5 INJECTION INTRAVENOUS at 20:37

## 2021-01-01 RX ADMIN — FAMOTIDINE 20 MG: 10 INJECTION, SOLUTION INTRAVENOUS at 21:32

## 2021-01-01 RX ADMIN — ENOXAPARIN SODIUM 40 MG: 40 INJECTION SUBCUTANEOUS at 09:20

## 2021-01-01 RX ADMIN — ACETAMINOPHEN 1000 MG: 500 TABLET ORAL at 17:47

## 2021-01-01 RX ADMIN — CHLORHEXIDINE GLUCONATE 0.12% ORAL RINSE 15 ML: 1.2 LIQUID ORAL at 20:58

## 2021-01-01 RX ADMIN — ACETAMINOPHEN 650 MG: 650 SUPPOSITORY RECTAL at 15:15

## 2021-01-01 RX ADMIN — INSULIN GLARGINE 50 UNITS: 100 INJECTION, SOLUTION SUBCUTANEOUS at 21:33

## 2021-01-01 RX ADMIN — PROPOFOL 45 MCG/KG/MIN: 10 INJECTION, EMULSION INTRAVENOUS at 15:08

## 2021-01-01 RX ADMIN — ALBUTEROL SULFATE 4 PUFF: 90 AEROSOL, METERED RESPIRATORY (INHALATION) at 00:26

## 2021-01-01 RX ADMIN — NOREPINEPHRINE BITARTRATE 2 MCG/MIN: 1 INJECTION, SOLUTION, CONCENTRATE INTRAVENOUS at 07:54

## 2021-01-01 RX ADMIN — LEVOTHYROXINE SODIUM 75 MCG: 75 TABLET ORAL at 07:19

## 2021-01-01 RX ADMIN — MEROPENEM 1000 MG: 1 INJECTION, POWDER, FOR SOLUTION INTRAVENOUS at 02:20

## 2021-01-01 RX ADMIN — VANCOMYCIN HYDROCHLORIDE 1750 MG: 5 INJECTION, POWDER, LYOPHILIZED, FOR SOLUTION INTRAVENOUS at 09:59

## 2021-01-01 RX ADMIN — PROPOFOL 30 MCG/KG/MIN: 10 INJECTION, EMULSION INTRAVENOUS at 15:43

## 2021-01-01 RX ADMIN — DIAZEPAM 5 MG: 5 TABLET ORAL at 08:09

## 2021-01-01 RX ADMIN — METOCLOPRAMIDE 10 MG: 5 INJECTION, SOLUTION INTRAMUSCULAR; INTRAVENOUS at 21:31

## 2021-01-01 RX ADMIN — Medication 4 PUFF: at 00:12

## 2021-01-01 RX ADMIN — PROPOFOL 20 MCG/KG/MIN: 10 INJECTION, EMULSION INTRAVENOUS at 20:45

## 2021-01-01 RX ADMIN — INSULIN LISPRO 12 UNITS: 100 INJECTION, SOLUTION INTRAVENOUS; SUBCUTANEOUS at 01:30

## 2021-01-01 RX ADMIN — FOLIC ACID 1 MG: 1 TABLET ORAL at 10:39

## 2021-01-01 RX ADMIN — CALCIUM GLUCONATE 2000 MG: 98 INJECTION, SOLUTION INTRAVENOUS at 08:24

## 2021-01-01 RX ADMIN — Medication 4 PUFF: at 00:46

## 2021-01-01 RX ADMIN — INSULIN LISPRO 12 UNITS: 100 INJECTION, SOLUTION INTRAVENOUS; SUBCUTANEOUS at 23:03

## 2021-01-01 RX ADMIN — METHYLPREDNISOLONE SODIUM SUCCINATE 40 MG: 40 INJECTION, POWDER, FOR SOLUTION INTRAMUSCULAR; INTRAVENOUS at 10:20

## 2021-01-01 RX ADMIN — FAMOTIDINE 20 MG: 10 INJECTION, SOLUTION INTRAVENOUS at 15:33

## 2021-01-01 RX ADMIN — PROPOFOL 20 MCG/KG/MIN: 10 INJECTION, EMULSION INTRAVENOUS at 13:04

## 2021-01-01 RX ADMIN — REMDESIVIR 200 MG: 100 INJECTION, POWDER, LYOPHILIZED, FOR SOLUTION INTRAVENOUS at 22:33

## 2021-01-01 RX ADMIN — Medication 4 PUFF: at 11:37

## 2021-01-01 RX ADMIN — PROPOFOL 20 MCG/KG/MIN: 10 INJECTION, EMULSION INTRAVENOUS at 06:10

## 2021-01-01 RX ADMIN — MEROPENEM 1000 MG: 1 INJECTION, POWDER, FOR SOLUTION INTRAVENOUS at 14:09

## 2021-01-01 RX ADMIN — Medication 1600 ML/HR: at 22:40

## 2021-01-01 RX ADMIN — PROPOFOL 80 MCG/KG/MIN: 10 INJECTION, EMULSION INTRAVENOUS at 21:20

## 2021-01-01 RX ADMIN — METOCLOPRAMIDE 10 MG: 5 INJECTION, SOLUTION INTRAMUSCULAR; INTRAVENOUS at 21:42

## 2021-01-01 RX ADMIN — Medication 4 PUFF: at 23:30

## 2021-01-01 RX ADMIN — Medication 4 PUFF: at 07:44

## 2021-01-01 RX ADMIN — SODIUM CHLORIDE, PRESERVATIVE FREE 10 ML: 5 INJECTION INTRAVENOUS at 20:58

## 2021-01-01 RX ADMIN — INSULIN LISPRO 6 UNITS: 100 INJECTION, SOLUTION INTRAVENOUS; SUBCUTANEOUS at 21:31

## 2021-01-01 RX ADMIN — INSULIN LISPRO 6 UNITS: 100 INJECTION, SOLUTION INTRAVENOUS; SUBCUTANEOUS at 03:29

## 2021-01-01 RX ADMIN — INSULIN GLARGINE 60 UNITS: 100 INJECTION, SOLUTION SUBCUTANEOUS at 21:33

## 2021-01-01 RX ADMIN — HEPARIN SODIUM 5000 UNITS: 5000 INJECTION INTRAVENOUS; SUBCUTANEOUS at 12:28

## 2021-01-01 RX ADMIN — HEPARIN SODIUM 5000 UNITS: 5000 INJECTION INTRAVENOUS; SUBCUTANEOUS at 21:33

## 2021-01-01 RX ADMIN — Medication 1600 ML/HR: at 19:23

## 2021-01-01 RX ADMIN — Medication: at 19:26

## 2021-01-01 RX ADMIN — METHYLPREDNISOLONE SODIUM SUCCINATE 40 MG: 40 INJECTION, POWDER, FOR SOLUTION INTRAMUSCULAR; INTRAVENOUS at 21:00

## 2021-01-01 RX ADMIN — EPINEPHRINE 17 MCG/MIN: 1 INJECTION, SOLUTION, CONCENTRATE INTRAVENOUS at 11:52

## 2021-01-01 RX ADMIN — MICONAZOLE NITRATE: 20 POWDER TOPICAL at 21:30

## 2021-01-01 RX ADMIN — DOPAMINE HYDROCHLORIDE 11 MCG/KG/MIN: 160 INJECTION, SOLUTION INTRAVENOUS at 12:39

## 2021-01-01 RX ADMIN — ASPIRIN 81 MG CHEWABLE TABLET 81 MG: 81 TABLET CHEWABLE at 08:36

## 2021-01-01 RX ADMIN — PROPOFOL 60 MCG/KG/MIN: 10 INJECTION, EMULSION INTRAVENOUS at 08:55

## 2021-01-01 RX ADMIN — POLYVINYL ALCOHOL 1 DROP: 14 SOLUTION/ DROPS OPHTHALMIC at 06:22

## 2021-01-01 RX ADMIN — ALBUTEROL SULFATE 4 PUFF: 90 AEROSOL, METERED RESPIRATORY (INHALATION) at 12:06

## 2021-01-01 RX ADMIN — MESALAMINE 800 MG: 400 CAPSULE, DELAYED RELEASE ORAL at 14:18

## 2021-01-01 RX ADMIN — APIXABAN 5 MG: 5 TABLET, FILM COATED ORAL at 20:35

## 2021-01-01 RX ADMIN — METOCLOPRAMIDE 10 MG: 5 INJECTION, SOLUTION INTRAMUSCULAR; INTRAVENOUS at 20:19

## 2021-01-01 RX ADMIN — FENOFIBRATE 160 MG: 160 TABLET ORAL at 10:39

## 2021-01-01 RX ADMIN — ALBUTEROL SULFATE 4 PUFF: 90 AEROSOL, METERED RESPIRATORY (INHALATION) at 10:48

## 2021-01-01 RX ADMIN — ALBUTEROL SULFATE 4 PUFF: 90 AEROSOL, METERED RESPIRATORY (INHALATION) at 00:45

## 2021-01-01 RX ADMIN — INSULIN LISPRO 6 UNITS: 100 INJECTION, SOLUTION INTRAVENOUS; SUBCUTANEOUS at 04:55

## 2021-01-01 RX ADMIN — Medication 200 MCG/HR: at 07:49

## 2021-01-01 RX ADMIN — Medication 4 PUFF: at 20:01

## 2021-01-01 RX ADMIN — ENOXAPARIN SODIUM 40 MG: 40 INJECTION SUBCUTANEOUS at 13:59

## 2021-01-01 RX ADMIN — MEROPENEM 1000 MG: 1 INJECTION, POWDER, FOR SOLUTION INTRAVENOUS at 16:09

## 2021-01-01 RX ADMIN — IPRATROPIUM BROMIDE 4 PUFF: 17 AEROSOL, METERED RESPIRATORY (INHALATION) at 11:55

## 2021-01-01 RX ADMIN — METHYLPREDNISOLONE SODIUM SUCCINATE 40 MG: 40 INJECTION, POWDER, FOR SOLUTION INTRAMUSCULAR; INTRAVENOUS at 02:33

## 2021-01-01 RX ADMIN — CALCIUM GLUCONATE 2000 MG: 98 INJECTION, SOLUTION INTRAVENOUS at 15:33

## 2021-01-01 RX ADMIN — CALCIUM GLUCONATE 1000 MG: 20 INJECTION, SOLUTION INTRAVENOUS at 17:15

## 2021-01-01 RX ADMIN — PROPOFOL 20 MCG/KG/MIN: 10 INJECTION, EMULSION INTRAVENOUS at 10:57

## 2021-01-01 RX ADMIN — MEROPENEM 1000 MG: 1 INJECTION, POWDER, FOR SOLUTION INTRAVENOUS at 18:22

## 2021-01-01 RX ADMIN — PROPOFOL 55 MCG/KG/MIN: 10 INJECTION, EMULSION INTRAVENOUS at 21:22

## 2021-01-01 RX ADMIN — ALBUTEROL SULFATE 4 PUFF: 90 AEROSOL, METERED RESPIRATORY (INHALATION) at 00:34

## 2021-01-01 RX ADMIN — Medication 4 PUFF: at 04:34

## 2021-01-01 RX ADMIN — DOPAMINE HYDROCHLORIDE 16.5 MCG/KG/MIN: 160 INJECTION, SOLUTION INTRAVENOUS at 17:54

## 2021-01-01 RX ADMIN — SODIUM CHLORIDE, PRESERVATIVE FREE 10 ML: 5 INJECTION INTRAVENOUS at 09:34

## 2021-01-01 RX ADMIN — HEPARIN SODIUM: 5000 INJECTION, SOLUTION INTRAVENOUS; SUBCUTANEOUS at 09:04

## 2021-01-01 RX ADMIN — Medication 1600 ML/HR: at 03:17

## 2021-01-01 RX ADMIN — IPRATROPIUM BROMIDE 4 PUFF: 17 AEROSOL, METERED RESPIRATORY (INHALATION) at 05:26

## 2021-01-01 RX ADMIN — PROPOFOL 59.96 MCG/KG/MIN: 10 INJECTION, EMULSION INTRAVENOUS at 18:05

## 2021-01-01 RX ADMIN — SODIUM CHLORIDE 3 MCG/KG/MIN: 9 INJECTION, SOLUTION INTRAVENOUS at 17:15

## 2021-01-01 RX ADMIN — SODIUM CHLORIDE, PRESERVATIVE FREE 10 ML: 5 INJECTION INTRAVENOUS at 07:00

## 2021-01-01 RX ADMIN — FENOFIBRATE 160 MG: 160 TABLET ORAL at 08:04

## 2021-01-01 RX ADMIN — PROPOFOL 60 MCG/KG/MIN: 10 INJECTION, EMULSION INTRAVENOUS at 02:45

## 2021-01-01 RX ADMIN — INSULIN LISPRO 6 UNITS: 100 INJECTION, SOLUTION INTRAVENOUS; SUBCUTANEOUS at 06:04

## 2021-01-01 RX ADMIN — SODIUM CHLORIDE, PRESERVATIVE FREE 10 ML: 5 INJECTION INTRAVENOUS at 02:08

## 2021-01-01 RX ADMIN — MINOCYCLINE HYDROCHLORIDE 100 MG: 100 CAPSULE ORAL at 10:02

## 2021-01-01 RX ADMIN — INSULIN LISPRO 6 UNITS: 100 INJECTION, SOLUTION INTRAVENOUS; SUBCUTANEOUS at 00:45

## 2021-01-01 RX ADMIN — CHLORHEXIDINE GLUCONATE 0.12% ORAL RINSE 15 ML: 1.2 LIQUID ORAL at 08:07

## 2021-01-01 RX ADMIN — Medication 200 MCG/HR: at 06:43

## 2021-01-01 RX ADMIN — CEFEPIME 2 G: 2 INJECTION, POWDER, FOR SOLUTION INTRAVENOUS at 21:38

## 2021-01-01 RX ADMIN — LORAZEPAM 1 MG: 2 INJECTION INTRAMUSCULAR; INTRAVENOUS at 16:04

## 2021-01-01 RX ADMIN — Medication 200 MCG/HR: at 08:52

## 2021-01-01 RX ADMIN — DOPAMINE HYDROCHLORIDE 8 MCG/KG/MIN: 160 INJECTION, SOLUTION INTRAVENOUS at 03:00

## 2021-01-01 RX ADMIN — Medication: at 13:00

## 2021-01-01 RX ADMIN — HEPARIN SODIUM: 5000 INJECTION, SOLUTION INTRAVENOUS; SUBCUTANEOUS at 22:00

## 2021-01-01 RX ADMIN — MESALAMINE 800 MG: 400 CAPSULE, DELAYED RELEASE ORAL at 21:31

## 2021-01-01 RX ADMIN — METHYLPREDNISOLONE SODIUM SUCCINATE 40 MG: 40 INJECTION, POWDER, FOR SOLUTION INTRAMUSCULAR; INTRAVENOUS at 15:11

## 2021-01-01 RX ADMIN — MINOCYCLINE HYDROCHLORIDE 100 MG: 100 CAPSULE ORAL at 08:45

## 2021-01-01 RX ADMIN — PROPOFOL 75 MCG/KG/MIN: 10 INJECTION, EMULSION INTRAVENOUS at 06:34

## 2021-01-01 RX ADMIN — PROPOFOL 60 MCG/KG/MIN: 10 INJECTION, EMULSION INTRAVENOUS at 07:58

## 2021-01-01 RX ADMIN — DOPAMINE HYDROCHLORIDE 8 MCG/KG/MIN: 160 INJECTION, SOLUTION INTRAVENOUS at 21:02

## 2021-01-01 RX ADMIN — MIDAZOLAM HYDROCHLORIDE 10 MG/HR: 5 INJECTION, SOLUTION INTRAMUSCULAR; INTRAVENOUS at 21:33

## 2021-01-01 RX ADMIN — INSULIN LISPRO 6 UNITS: 100 INJECTION, SOLUTION INTRAVENOUS; SUBCUTANEOUS at 01:14

## 2021-01-01 RX ADMIN — INSULIN LISPRO 12 UNITS: 100 INJECTION, SOLUTION INTRAVENOUS; SUBCUTANEOUS at 00:46

## 2021-01-01 RX ADMIN — SODIUM CHLORIDE, PRESERVATIVE FREE 10 ML: 5 INJECTION INTRAVENOUS at 21:01

## 2021-01-01 RX ADMIN — Medication 4 PUFF: at 04:27

## 2021-01-01 RX ADMIN — Medication 100 MCG/HR: at 21:22

## 2021-01-01 RX ADMIN — ALBUTEROL SULFATE 4 PUFF: 90 AEROSOL, METERED RESPIRATORY (INHALATION) at 03:47

## 2021-01-01 RX ADMIN — ALBUTEROL SULFATE 4 PUFF: 90 AEROSOL, METERED RESPIRATORY (INHALATION) at 23:42

## 2021-01-01 RX ADMIN — PROPOFOL 60 MCG/KG/MIN: 10 INJECTION, EMULSION INTRAVENOUS at 22:26

## 2021-01-01 RX ADMIN — DOPAMINE HYDROCHLORIDE 13.5 MCG/KG/MIN: 160 INJECTION, SOLUTION INTRAVENOUS at 01:11

## 2021-01-01 RX ADMIN — SODIUM CHLORIDE 3 MCG/KG/MIN: 9 INJECTION, SOLUTION INTRAVENOUS at 15:22

## 2021-01-01 RX ADMIN — SODIUM CHLORIDE 250 ML: 9 INJECTION, SOLUTION INTRAVENOUS at 16:39

## 2021-01-01 RX ADMIN — SODIUM CHLORIDE, PRESERVATIVE FREE 10 ML: 5 INJECTION INTRAVENOUS at 09:00

## 2021-01-01 RX ADMIN — SODIUM CHLORIDE, PRESERVATIVE FREE 10 ML: 5 INJECTION INTRAVENOUS at 08:58

## 2021-01-01 RX ADMIN — MICONAZOLE NITRATE: 20 POWDER TOPICAL at 21:31

## 2021-01-01 RX ADMIN — Medication 4 PUFF: at 03:28

## 2021-01-01 RX ADMIN — SODIUM CHLORIDE, PRESERVATIVE FREE 10 ML: 5 INJECTION INTRAVENOUS at 09:47

## 2021-01-01 RX ADMIN — PANTOPRAZOLE SODIUM 40 MG: 40 TABLET, DELAYED RELEASE ORAL at 06:15

## 2021-01-01 RX ADMIN — MICONAZOLE NITRATE: 20 POWDER TOPICAL at 20:18

## 2021-01-01 RX ADMIN — Medication 4 PUFF: at 07:59

## 2021-01-01 RX ADMIN — Medication 4 PUFF: at 23:59

## 2021-01-01 RX ADMIN — ALBUTEROL SULFATE 4 PUFF: 90 AEROSOL, METERED RESPIRATORY (INHALATION) at 21:00

## 2021-01-01 RX ADMIN — CEFEPIME 2000 MG: 2 INJECTION, POWDER, FOR SOLUTION INTRAVENOUS at 02:39

## 2021-01-01 RX ADMIN — FAMOTIDINE 20 MG: 10 INJECTION, SOLUTION INTRAVENOUS at 09:18

## 2021-01-01 RX ADMIN — APIXABAN 5 MG: 5 TABLET, FILM COATED ORAL at 09:18

## 2021-01-01 RX ADMIN — PROPOFOL 80 MCG/KG/MIN: 10 INJECTION, EMULSION INTRAVENOUS at 22:37

## 2021-01-01 RX ADMIN — DOPAMINE HYDROCHLORIDE 11.5 MCG/KG/MIN: 160 INJECTION, SOLUTION INTRAVENOUS at 13:05

## 2021-01-01 RX ADMIN — FOLIC ACID 1 MG: 1 TABLET ORAL at 08:05

## 2021-01-01 RX ADMIN — LEVOTHYROXINE SODIUM 75 MCG: 75 TABLET ORAL at 06:17

## 2021-01-01 RX ADMIN — DEXAMETHASONE 6 MG: 4 TABLET ORAL at 08:09

## 2021-01-01 RX ADMIN — PROPOFOL 30 MCG/KG/MIN: 10 INJECTION, EMULSION INTRAVENOUS at 12:02

## 2021-01-01 RX ADMIN — PROPOFOL 60 MCG/KG/MIN: 10 INJECTION, EMULSION INTRAVENOUS at 06:00

## 2021-01-01 RX ADMIN — Medication 175 MCG/HR: at 23:50

## 2021-01-01 RX ADMIN — PROPOFOL 40 MCG/KG/MIN: 10 INJECTION, EMULSION INTRAVENOUS at 18:30

## 2021-01-01 RX ADMIN — Medication 1600 ML/HR: at 06:37

## 2021-01-01 RX ADMIN — Medication 1600 ML/HR: at 15:48

## 2021-01-01 RX ADMIN — SODIUM CHLORIDE, PRESERVATIVE FREE 10 ML: 5 INJECTION INTRAVENOUS at 08:59

## 2021-01-01 RX ADMIN — ALBUTEROL SULFATE 4 PUFF: 90 AEROSOL, METERED RESPIRATORY (INHALATION) at 12:09

## 2021-01-01 RX ADMIN — ATORVASTATIN CALCIUM 10 MG: 10 TABLET, FILM COATED ORAL at 09:25

## 2021-01-01 RX ADMIN — SODIUM CHLORIDE, PRESERVATIVE FREE 10 ML: 5 INJECTION INTRAVENOUS at 21:30

## 2021-01-01 RX ADMIN — MESALAMINE 800 MG: 400 CAPSULE, DELAYED RELEASE ORAL at 09:42

## 2021-01-01 RX ADMIN — CHLORHEXIDINE GLUCONATE 0.12% ORAL RINSE 15 ML: 1.2 LIQUID ORAL at 21:06

## 2021-01-01 RX ADMIN — ALBUTEROL SULFATE 4 PUFF: 90 AEROSOL, METERED RESPIRATORY (INHALATION) at 00:06

## 2021-01-01 RX ADMIN — DOPAMINE HYDROCHLORIDE 13 MCG/KG/MIN: 160 INJECTION, SOLUTION INTRAVENOUS at 21:07

## 2021-01-01 RX ADMIN — MESALAMINE 800 MG: 400 CAPSULE, DELAYED RELEASE ORAL at 09:23

## 2021-01-01 RX ADMIN — FENOFIBRATE 160 MG: 160 TABLET ORAL at 09:25

## 2021-01-01 RX ADMIN — Medication: at 08:58

## 2021-01-01 RX ADMIN — CALCIUM GLUCONATE 1000 MG: 20 INJECTION, SOLUTION INTRAVENOUS at 04:33

## 2021-01-01 RX ADMIN — ACETAMINOPHEN 650 MG: 325 TABLET ORAL at 10:38

## 2021-01-01 RX ADMIN — IPRATROPIUM BROMIDE 4 PUFF: 17 AEROSOL, METERED RESPIRATORY (INHALATION) at 00:48

## 2021-01-01 RX ADMIN — ALBUTEROL SULFATE 4 PUFF: 90 AEROSOL, METERED RESPIRATORY (INHALATION) at 08:03

## 2021-01-01 RX ADMIN — FAMOTIDINE 20 MG: 10 INJECTION, SOLUTION INTRAVENOUS at 09:54

## 2021-01-01 RX ADMIN — Medication 4 PUFF: at 08:02

## 2021-01-01 RX ADMIN — ONDANSETRON 4 MG: 2 INJECTION INTRAMUSCULAR; INTRAVENOUS at 07:49

## 2021-01-01 RX ADMIN — AMLODIPINE BESYLATE 10 MG: 10 TABLET ORAL at 09:42

## 2021-01-01 RX ADMIN — Medication: at 20:24

## 2021-01-01 RX ADMIN — HEPARIN SODIUM 5000 UNITS: 5000 INJECTION INTRAVENOUS; SUBCUTANEOUS at 15:01

## 2021-01-01 RX ADMIN — Medication 4 PUFF: at 03:35

## 2021-01-01 RX ADMIN — PROPOFOL 59.96 MCG/KG/MIN: 10 INJECTION, EMULSION INTRAVENOUS at 16:08

## 2021-01-01 RX ADMIN — PROPOFOL 70 MCG/KG/MIN: 10 INJECTION, EMULSION INTRAVENOUS at 17:35

## 2021-01-01 RX ADMIN — METOCLOPRAMIDE 10 MG: 5 INJECTION, SOLUTION INTRAMUSCULAR; INTRAVENOUS at 06:49

## 2021-01-01 RX ADMIN — MICONAZOLE NITRATE: 20 POWDER TOPICAL at 09:37

## 2021-01-01 RX ADMIN — MICONAZOLE NITRATE: 20 POWDER TOPICAL at 09:59

## 2021-01-01 RX ADMIN — Medication 1600 ML/HR: at 08:58

## 2021-01-01 RX ADMIN — ATORVASTATIN CALCIUM 10 MG: 10 TABLET, FILM COATED ORAL at 09:41

## 2021-01-01 RX ADMIN — CALCIUM GLUCONATE 4000 MG: 98 INJECTION, SOLUTION INTRAVENOUS at 03:32

## 2021-01-01 RX ADMIN — INSULIN LISPRO 6 UNITS: 100 INJECTION, SOLUTION INTRAVENOUS; SUBCUTANEOUS at 10:13

## 2021-01-01 RX ADMIN — ALBUTEROL SULFATE 4 PUFF: 90 AEROSOL, METERED RESPIRATORY (INHALATION) at 20:52

## 2021-01-01 RX ADMIN — INSULIN GLARGINE 30 UNITS: 100 INJECTION, SOLUTION SUBCUTANEOUS at 21:20

## 2021-01-01 RX ADMIN — ALBUTEROL SULFATE 4 PUFF: 90 AEROSOL, METERED RESPIRATORY (INHALATION) at 13:30

## 2021-01-01 RX ADMIN — INSULIN LISPRO 16 UNITS: 100 INJECTION, SOLUTION INTRAVENOUS; SUBCUTANEOUS at 16:54

## 2021-01-01 RX ADMIN — Medication 4 PUFF: at 07:55

## 2021-01-01 RX ADMIN — ALBUTEROL SULFATE 4 PUFF: 90 AEROSOL, METERED RESPIRATORY (INHALATION) at 16:45

## 2021-01-01 RX ADMIN — MICONAZOLE NITRATE: 20 POWDER TOPICAL at 09:31

## 2021-01-01 RX ADMIN — DOPAMINE HYDROCHLORIDE 10 MCG/KG/MIN: 160 INJECTION, SOLUTION INTRAVENOUS at 01:22

## 2021-01-01 RX ADMIN — DOPAMINE HYDROCHLORIDE 9 MCG/KG/MIN: 160 INJECTION, SOLUTION INTRAVENOUS at 08:09

## 2021-01-01 RX ADMIN — Medication 200 MCG/HR: at 11:32

## 2021-01-01 RX ADMIN — CEFEPIME 2 G: 2 INJECTION, POWDER, FOR SOLUTION INTRAVENOUS at 10:38

## 2021-01-01 RX ADMIN — SODIUM BICARBONATE: 84 INJECTION, SOLUTION INTRAVENOUS at 12:31

## 2021-01-01 RX ADMIN — PROPOFOL 40 MCG/KG/MIN: 10 INJECTION, EMULSION INTRAVENOUS at 04:00

## 2021-01-01 RX ADMIN — MIDAZOLAM HYDROCHLORIDE 1 MG/HR: 5 INJECTION, SOLUTION INTRAMUSCULAR; INTRAVENOUS at 11:11

## 2021-01-01 RX ADMIN — DOPAMINE HYDROCHLORIDE 9 MCG/KG/MIN: 160 INJECTION, SOLUTION INTRAVENOUS at 02:33

## 2021-01-01 RX ADMIN — INSULIN LISPRO 6 UNITS: 100 INJECTION, SOLUTION INTRAVENOUS; SUBCUTANEOUS at 05:30

## 2021-01-01 RX ADMIN — INSULIN LISPRO 6 UNITS: 100 INJECTION, SOLUTION INTRAVENOUS; SUBCUTANEOUS at 12:03

## 2021-01-01 RX ADMIN — DOPAMINE HYDROCHLORIDE 7.5 MCG/KG/MIN: 160 INJECTION, SOLUTION INTRAVENOUS at 01:11

## 2021-01-01 RX ADMIN — SODIUM CHLORIDE, PRESERVATIVE FREE 10 ML: 5 INJECTION INTRAVENOUS at 08:36

## 2021-01-01 RX ADMIN — METOCLOPRAMIDE 10 MG: 5 INJECTION, SOLUTION INTRAMUSCULAR; INTRAVENOUS at 21:32

## 2021-01-01 RX ADMIN — PROPOFOL 70 MCG/KG/MIN: 10 INJECTION, EMULSION INTRAVENOUS at 20:51

## 2021-01-01 RX ADMIN — PROPOFOL 30 MCG/KG/MIN: 10 INJECTION, EMULSION INTRAVENOUS at 08:30

## 2021-01-01 RX ADMIN — Medication 4 PUFF: at 14:54

## 2021-01-01 RX ADMIN — CHLORHEXIDINE GLUCONATE 0.12% ORAL RINSE 15 ML: 1.2 LIQUID ORAL at 08:38

## 2021-01-01 RX ADMIN — PIOGLITAZONE 30 MG: 30 TABLET ORAL at 09:42

## 2021-01-01 RX ADMIN — METOCLOPRAMIDE 10 MG: 5 INJECTION, SOLUTION INTRAMUSCULAR; INTRAVENOUS at 13:49

## 2021-01-01 RX ADMIN — Medication 1600 ML/HR: at 17:30

## 2021-01-01 RX ADMIN — VANCOMYCIN HYDROCHLORIDE 1750 MG: 5 INJECTION, POWDER, LYOPHILIZED, FOR SOLUTION INTRAVENOUS at 12:11

## 2021-01-01 RX ADMIN — EZETIMIBE 10 MG: 10 TABLET ORAL at 09:18

## 2021-01-01 RX ADMIN — ENOXAPARIN SODIUM 30 MG: 30 INJECTION SUBCUTANEOUS at 21:15

## 2021-01-01 RX ADMIN — PROPOFOL 60 MCG/KG/MIN: 10 INJECTION, EMULSION INTRAVENOUS at 20:34

## 2021-01-01 RX ADMIN — ATORVASTATIN CALCIUM 10 MG: 10 TABLET, FILM COATED ORAL at 17:58

## 2021-01-01 RX ADMIN — NOREPINEPHRINE BITARTRATE 25 MCG/MIN: 1 INJECTION, SOLUTION, CONCENTRATE INTRAVENOUS at 00:32

## 2021-01-01 RX ADMIN — INSULIN LISPRO 16 UNITS: 100 INJECTION, SOLUTION INTRAVENOUS; SUBCUTANEOUS at 00:32

## 2021-01-01 RX ADMIN — CALCIUM GLUCONATE 2000 MG: 98 INJECTION, SOLUTION INTRAVENOUS at 02:12

## 2021-01-01 RX ADMIN — ALBUTEROL SULFATE 4 PUFF: 90 AEROSOL, METERED RESPIRATORY (INHALATION) at 07:49

## 2021-01-01 RX ADMIN — Medication 4 PUFF: at 00:22

## 2021-01-01 RX ADMIN — INSULIN LISPRO 6 UNITS: 100 INJECTION, SOLUTION INTRAVENOUS; SUBCUTANEOUS at 08:27

## 2021-01-01 RX ADMIN — INSULIN GLARGINE 50 UNITS: 100 INJECTION, SOLUTION SUBCUTANEOUS at 21:21

## 2021-01-01 RX ADMIN — PROPOFOL 60 MCG/KG/MIN: 10 INJECTION, EMULSION INTRAVENOUS at 01:22

## 2021-01-01 RX ADMIN — Medication 4 PUFF: at 15:11

## 2021-01-01 RX ADMIN — ALBUMIN (HUMAN) 12.5 G: 0.25 INJECTION, SOLUTION INTRAVENOUS at 06:25

## 2021-01-01 RX ADMIN — Medication 4 PUFF: at 00:35

## 2021-01-01 RX ADMIN — Medication 4 PUFF: at 07:21

## 2021-01-01 RX ADMIN — SODIUM CHLORIDE, PRESERVATIVE FREE 10 ML: 5 INJECTION INTRAVENOUS at 10:25

## 2021-01-01 RX ADMIN — ALBUTEROL SULFATE 4 PUFF: 90 AEROSOL, METERED RESPIRATORY (INHALATION) at 05:26

## 2021-01-01 RX ADMIN — DEXAMETHASONE SODIUM PHOSPHATE 10 MG: 4 INJECTION, SOLUTION INTRAMUSCULAR; INTRAVENOUS at 20:36

## 2021-01-01 RX ADMIN — INSULIN LISPRO 18 UNITS: 100 INJECTION, SOLUTION INTRAVENOUS; SUBCUTANEOUS at 21:01

## 2021-01-01 RX ADMIN — VANCOMYCIN HYDROCHLORIDE 1750 MG: 5 INJECTION, POWDER, LYOPHILIZED, FOR SOLUTION INTRAVENOUS at 10:19

## 2021-01-01 RX ADMIN — NOREPINEPHRINE BITARTRATE 10 MCG/MIN: 1 INJECTION, SOLUTION, CONCENTRATE INTRAVENOUS at 06:30

## 2021-01-01 RX ADMIN — ALBUTEROL SULFATE 4 PUFF: 90 AEROSOL, METERED RESPIRATORY (INHALATION) at 15:05

## 2021-01-01 RX ADMIN — Medication: at 05:10

## 2021-01-01 RX ADMIN — ALBUTEROL SULFATE 4 PUFF: 90 AEROSOL, METERED RESPIRATORY (INHALATION) at 20:33

## 2021-01-01 RX ADMIN — PROPOFOL 25 MCG/KG/MIN: 10 INJECTION, EMULSION INTRAVENOUS at 16:20

## 2021-01-01 RX ADMIN — FENOFIBRATE 160 MG: 160 TABLET ORAL at 08:09

## 2021-01-01 RX ADMIN — MESALAMINE 800 MG: 400 CAPSULE, DELAYED RELEASE ORAL at 21:15

## 2021-01-01 RX ADMIN — ALBUTEROL SULFATE 4 PUFF: 90 AEROSOL, METERED RESPIRATORY (INHALATION) at 11:06

## 2021-01-01 RX ADMIN — INSULIN LISPRO 6 UNITS: 100 INJECTION, SOLUTION INTRAVENOUS; SUBCUTANEOUS at 08:06

## 2021-01-01 RX ADMIN — Medication 100 MCG/HR: at 07:18

## 2021-01-01 RX ADMIN — INSULIN LISPRO 6 UNITS: 100 INJECTION, SOLUTION INTRAVENOUS; SUBCUTANEOUS at 21:29

## 2021-01-01 RX ADMIN — MICONAZOLE NITRATE: 20 POWDER TOPICAL at 21:33

## 2021-01-01 RX ADMIN — MICONAZOLE NITRATE: 20 POWDER TOPICAL at 08:46

## 2021-01-01 RX ADMIN — DEXTROSE 15 G: 15 GEL ORAL at 15:28

## 2021-01-01 RX ADMIN — WHITE PETROLATUM: .15; .85 OINTMENT OPHTHALMIC at 06:22

## 2021-01-01 RX ADMIN — MINOCYCLINE HYDROCHLORIDE 100 MG: 100 CAPSULE ORAL at 21:32

## 2021-01-01 RX ADMIN — PROPOFOL 60 MCG/KG/MIN: 10 INJECTION, EMULSION INTRAVENOUS at 12:00

## 2021-01-01 RX ADMIN — PROPOFOL 60 MCG/KG/MIN: 10 INJECTION, EMULSION INTRAVENOUS at 08:10

## 2021-01-01 RX ADMIN — ALBUTEROL SULFATE 4 PUFF: 90 AEROSOL, METERED RESPIRATORY (INHALATION) at 15:56

## 2021-01-01 RX ADMIN — FAMOTIDINE 20 MG: 10 INJECTION, SOLUTION INTRAVENOUS at 08:36

## 2021-01-01 RX ADMIN — METOCLOPRAMIDE 10 MG: 5 INJECTION, SOLUTION INTRAMUSCULAR; INTRAVENOUS at 14:39

## 2021-01-01 RX ADMIN — HEPARIN SODIUM: 5000 INJECTION, SOLUTION INTRAVENOUS; SUBCUTANEOUS at 15:03

## 2021-01-01 RX ADMIN — Medication 200 MCG/HR: at 19:10

## 2021-01-01 RX ADMIN — ALBUTEROL SULFATE 4 PUFF: 90 AEROSOL, METERED RESPIRATORY (INHALATION) at 16:18

## 2021-01-01 RX ADMIN — MICONAZOLE NITRATE: 20 POWDER TOPICAL at 22:00

## 2021-01-01 RX ADMIN — MINOCYCLINE HYDROCHLORIDE 100 MG: 100 CAPSULE ORAL at 21:33

## 2021-01-01 RX ADMIN — Medication: at 14:09

## 2021-01-01 RX ADMIN — METHYLPREDNISOLONE SODIUM SUCCINATE 40 MG: 40 INJECTION, POWDER, FOR SOLUTION INTRAMUSCULAR; INTRAVENOUS at 08:24

## 2021-01-01 RX ADMIN — Medication 4 PUFF: at 08:51

## 2021-01-01 RX ADMIN — CHLORHEXIDINE GLUCONATE 0.12% ORAL RINSE 15 ML: 1.2 LIQUID ORAL at 09:32

## 2021-01-01 RX ADMIN — ALBUTEROL SULFATE 4 PUFF: 90 AEROSOL, METERED RESPIRATORY (INHALATION) at 04:51

## 2021-01-01 RX ADMIN — PROPOFOL 60 MCG/KG/MIN: 10 INJECTION, EMULSION INTRAVENOUS at 17:45

## 2021-01-01 RX ADMIN — PROPOFOL 70 MCG/KG/MIN: 10 INJECTION, EMULSION INTRAVENOUS at 18:55

## 2021-01-01 RX ADMIN — APIXABAN 5 MG: 5 TABLET, FILM COATED ORAL at 21:20

## 2021-01-01 RX ADMIN — PROPOFOL 40 MCG/KG/MIN: 10 INJECTION, EMULSION INTRAVENOUS at 09:52

## 2021-01-01 RX ADMIN — ALBUTEROL SULFATE 4 PUFF: 90 AEROSOL, METERED RESPIRATORY (INHALATION) at 11:45

## 2021-01-01 RX ADMIN — DIAZEPAM 5 MG: 5 TABLET ORAL at 09:18

## 2021-01-01 RX ADMIN — DOPAMINE HYDROCHLORIDE 20 MCG/KG/MIN: 160 INJECTION, SOLUTION INTRAVENOUS at 11:00

## 2021-01-01 RX ADMIN — CHLORHEXIDINE GLUCONATE 0.12% ORAL RINSE 15 ML: 1.2 LIQUID ORAL at 20:54

## 2021-01-01 RX ADMIN — CONIVAPTAN HYDROCHLORIDE 20 MG/DAY: 20 INJECTION, SOLUTION INTRAVENOUS at 20:02

## 2021-01-01 RX ADMIN — Medication 4 PUFF: at 04:51

## 2021-01-01 RX ADMIN — ALBUTEROL SULFATE 4 PUFF: 90 AEROSOL, METERED RESPIRATORY (INHALATION) at 03:35

## 2021-01-01 RX ADMIN — PANTOPRAZOLE SODIUM 40 MG: 40 TABLET, DELAYED RELEASE ORAL at 06:00

## 2021-01-01 RX ADMIN — MESALAMINE 800 MG: 400 CAPSULE, DELAYED RELEASE ORAL at 09:18

## 2021-01-01 RX ADMIN — Medication: at 04:39

## 2021-01-01 RX ADMIN — Medication 200 MCG/HR: at 22:13

## 2021-01-01 RX ADMIN — ALBUTEROL SULFATE 4 PUFF: 90 AEROSOL, METERED RESPIRATORY (INHALATION) at 07:55

## 2021-01-01 RX ADMIN — ALBUTEROL SULFATE 4 PUFF: 90 AEROSOL, METERED RESPIRATORY (INHALATION) at 11:55

## 2021-01-01 RX ADMIN — Medication 75 MCG/HR: at 17:10

## 2021-01-01 RX ADMIN — Medication 4 PUFF: at 08:49

## 2021-01-01 RX ADMIN — INSULIN LISPRO 18 UNITS: 100 INJECTION, SOLUTION INTRAVENOUS; SUBCUTANEOUS at 12:09

## 2021-01-01 RX ADMIN — SODIUM CHLORIDE, PRESERVATIVE FREE 10 ML: 5 INJECTION INTRAVENOUS at 09:59

## 2021-01-01 RX ADMIN — MIDAZOLAM HYDROCHLORIDE 10 MG/HR: 5 INJECTION, SOLUTION INTRAMUSCULAR; INTRAVENOUS at 07:35

## 2021-01-01 RX ADMIN — HEPARIN SODIUM 11.5 UNITS/KG/HR: 10000 INJECTION, SOLUTION INTRAVENOUS at 03:43

## 2021-01-01 RX ADMIN — SODIUM CHLORIDE, PRESERVATIVE FREE 10 ML: 5 INJECTION INTRAVENOUS at 21:10

## 2021-01-01 RX ADMIN — Medication 4 PUFF: at 05:25

## 2021-01-01 RX ADMIN — LEVOTHYROXINE SODIUM 75 MCG: 75 TABLET ORAL at 05:06

## 2021-01-01 RX ADMIN — Medication 4 PUFF: at 21:00

## 2021-01-01 RX ADMIN — ASPIRIN 81 MG CHEWABLE TABLET 81 MG: 81 TABLET CHEWABLE at 10:01

## 2021-01-01 RX ADMIN — CEFEPIME 2 G: 2 INJECTION, POWDER, FOR SOLUTION INTRAVENOUS at 09:18

## 2021-01-01 RX ADMIN — METOCLOPRAMIDE 10 MG: 5 INJECTION, SOLUTION INTRAMUSCULAR; INTRAVENOUS at 14:10

## 2021-01-01 RX ADMIN — MICONAZOLE NITRATE: 20 POWDER TOPICAL at 20:59

## 2021-01-01 RX ADMIN — Medication 4 PUFF: at 15:43

## 2021-01-01 RX ADMIN — Medication: at 06:43

## 2021-01-01 RX ADMIN — MESALAMINE 800 MG: 400 CAPSULE, DELAYED RELEASE ORAL at 20:55

## 2021-01-01 RX ADMIN — ENOXAPARIN SODIUM 30 MG: 30 INJECTION SUBCUTANEOUS at 21:45

## 2021-01-01 RX ADMIN — INSULIN GLARGINE 30 UNITS: 100 INJECTION, SOLUTION SUBCUTANEOUS at 20:56

## 2021-01-01 RX ADMIN — Medication: at 08:59

## 2021-01-01 RX ADMIN — MICONAZOLE NITRATE: 20 POWDER TOPICAL at 08:38

## 2021-01-01 RX ADMIN — Medication: at 02:20

## 2021-01-01 RX ADMIN — Medication 4 PUFF: at 20:16

## 2021-01-01 RX ADMIN — INSULIN LISPRO 4 UNITS: 100 INJECTION, SOLUTION INTRAVENOUS; SUBCUTANEOUS at 09:21

## 2021-01-01 RX ADMIN — Medication 4 PUFF: at 23:42

## 2021-01-01 RX ADMIN — APIXABAN 5 MG: 5 TABLET, FILM COATED ORAL at 21:31

## 2021-01-01 RX ADMIN — CEFEPIME 2 G: 2 INJECTION, POWDER, FOR SOLUTION INTRAVENOUS at 20:54

## 2021-01-01 RX ADMIN — METHYLPREDNISOLONE SODIUM SUCCINATE 40 MG: 40 INJECTION, POWDER, FOR SOLUTION INTRAMUSCULAR; INTRAVENOUS at 02:32

## 2021-01-01 RX ADMIN — SODIUM CHLORIDE, PRESERVATIVE FREE 10 ML: 5 INJECTION INTRAVENOUS at 08:26

## 2021-01-01 RX ADMIN — Medication 1600 ML/HR: at 12:35

## 2021-01-01 RX ADMIN — PROPOFOL 30 MCG/KG/MIN: 10 INJECTION, EMULSION INTRAVENOUS at 05:49

## 2021-01-01 RX ADMIN — CHLORHEXIDINE GLUCONATE 0.12% ORAL RINSE 15 ML: 1.2 LIQUID ORAL at 15:33

## 2021-01-01 RX ADMIN — FAMOTIDINE 20 MG: 10 INJECTION, SOLUTION INTRAVENOUS at 08:24

## 2021-01-01 RX ADMIN — INSULIN LISPRO 12 UNITS: 100 INJECTION, SOLUTION INTRAVENOUS; SUBCUTANEOUS at 08:48

## 2021-01-01 RX ADMIN — INSULIN LISPRO 12 UNITS: 100 INJECTION, SOLUTION INTRAVENOUS; SUBCUTANEOUS at 08:11

## 2021-01-01 RX ADMIN — CHLORHEXIDINE GLUCONATE 0.12% ORAL RINSE 15 ML: 1.2 LIQUID ORAL at 09:07

## 2021-01-01 RX ADMIN — INSULIN LISPRO 12 UNITS: 100 INJECTION, SOLUTION INTRAVENOUS; SUBCUTANEOUS at 02:12

## 2021-01-01 RX ADMIN — ALBUTEROL SULFATE 4 PUFF: 90 AEROSOL, METERED RESPIRATORY (INHALATION) at 08:23

## 2021-01-01 RX ADMIN — ACETAMINOPHEN 650 MG: 325 TABLET ORAL at 04:47

## 2021-01-01 RX ADMIN — Medication 1600 ML/HR: at 03:00

## 2021-01-01 RX ADMIN — ASPIRIN 81 MG CHEWABLE TABLET 81 MG: 81 TABLET CHEWABLE at 10:39

## 2021-01-01 RX ADMIN — CEFEPIME 2 G: 2 INJECTION, POWDER, FOR SOLUTION INTRAVENOUS at 21:26

## 2021-01-01 RX ADMIN — SODIUM CHLORIDE: 9 INJECTION, SOLUTION INTRAVENOUS at 01:05

## 2021-01-01 RX ADMIN — METOCLOPRAMIDE 10 MG: 5 INJECTION, SOLUTION INTRAMUSCULAR; INTRAVENOUS at 06:48

## 2021-01-01 RX ADMIN — DOPAMINE HYDROCHLORIDE 10 MCG/KG/MIN: 160 INJECTION, SOLUTION INTRAVENOUS at 21:00

## 2021-01-01 RX ADMIN — Medication: at 22:40

## 2021-01-01 RX ADMIN — DOPAMINE HYDROCHLORIDE 9 MCG/KG/MIN: 160 INJECTION, SOLUTION INTRAVENOUS at 10:28

## 2021-01-01 RX ADMIN — Medication 1600 ML/HR: at 18:59

## 2021-01-01 RX ADMIN — FAMOTIDINE 20 MG: 10 INJECTION, SOLUTION INTRAVENOUS at 10:01

## 2021-01-01 RX ADMIN — ALBUTEROL SULFATE 4 PUFF: 90 AEROSOL, METERED RESPIRATORY (INHALATION) at 08:50

## 2021-01-01 RX ADMIN — VANCOMYCIN HYDROCHLORIDE 1750 MG: 5 INJECTION, POWDER, LYOPHILIZED, FOR SOLUTION INTRAVENOUS at 10:43

## 2021-01-01 RX ADMIN — SODIUM CHLORIDE, PRESERVATIVE FREE 10 ML: 5 INJECTION INTRAVENOUS at 06:48

## 2021-01-01 RX ADMIN — Medication: at 15:42

## 2021-01-01 RX ADMIN — MIDAZOLAM HYDROCHLORIDE 10 MG/HR: 5 INJECTION, SOLUTION INTRAMUSCULAR; INTRAVENOUS at 13:16

## 2021-01-01 RX ADMIN — INSULIN LISPRO 6 UNITS: 100 INJECTION, SOLUTION INTRAVENOUS; SUBCUTANEOUS at 14:35

## 2021-01-01 RX ADMIN — ALBUTEROL SULFATE 4 PUFF: 90 AEROSOL, METERED RESPIRATORY (INHALATION) at 00:35

## 2021-01-01 RX ADMIN — SODIUM CHLORIDE, PRESERVATIVE FREE 10 ML: 5 INJECTION INTRAVENOUS at 01:59

## 2021-01-01 RX ADMIN — ENOXAPARIN SODIUM 30 MG: 30 INJECTION SUBCUTANEOUS at 10:39

## 2021-01-01 RX ADMIN — METHYLPREDNISOLONE SODIUM SUCCINATE 40 MG: 40 INJECTION, POWDER, FOR SOLUTION INTRAMUSCULAR; INTRAVENOUS at 02:06

## 2021-01-01 RX ADMIN — HEPARIN SODIUM 5000 UNITS: 5000 INJECTION INTRAVENOUS; SUBCUTANEOUS at 06:30

## 2021-01-01 RX ADMIN — Medication 200 MCG/HR: at 16:52

## 2021-01-01 RX ADMIN — Medication 200 MCG/HR: at 00:21

## 2021-01-01 RX ADMIN — PANTOPRAZOLE SODIUM 40 MG: 40 TABLET, DELAYED RELEASE ORAL at 05:06

## 2021-01-01 RX ADMIN — PROPOFOL 20 MCG/KG/MIN: 10 INJECTION, EMULSION INTRAVENOUS at 12:55

## 2021-01-01 RX ADMIN — CEFEPIME 2 G: 2 INJECTION, POWDER, FOR SOLUTION INTRAVENOUS at 20:34

## 2021-01-01 RX ADMIN — HEPARIN SODIUM 5000 UNITS: 5000 INJECTION INTRAVENOUS; SUBCUTANEOUS at 05:44

## 2021-01-01 RX ADMIN — PROPOFOL 70 MCG/KG/MIN: 10 INJECTION, EMULSION INTRAVENOUS at 21:21

## 2021-01-01 RX ADMIN — METOCLOPRAMIDE 10 MG: 5 INJECTION, SOLUTION INTRAMUSCULAR; INTRAVENOUS at 06:47

## 2021-01-01 RX ADMIN — IPRATROPIUM BROMIDE 4 PUFF: 17 AEROSOL, METERED RESPIRATORY (INHALATION) at 20:27

## 2021-01-01 RX ADMIN — Medication 4 PUFF: at 15:06

## 2021-01-01 RX ADMIN — INSULIN LISPRO 6 UNITS: 100 INJECTION, SOLUTION INTRAVENOUS; SUBCUTANEOUS at 05:23

## 2021-01-01 RX ADMIN — ALBUTEROL SULFATE 4 PUFF: 90 AEROSOL, METERED RESPIRATORY (INHALATION) at 04:35

## 2021-01-01 RX ADMIN — ENOXAPARIN SODIUM 40 MG: 40 INJECTION SUBCUTANEOUS at 09:54

## 2021-01-01 RX ADMIN — DEXAMETHASONE 6 MG: 4 TABLET ORAL at 08:04

## 2021-01-01 RX ADMIN — INSULIN LISPRO 12 UNITS: 100 INJECTION, SOLUTION INTRAVENOUS; SUBCUTANEOUS at 06:55

## 2021-01-01 RX ADMIN — LEVOTHYROXINE SODIUM 75 MCG: 75 TABLET ORAL at 06:38

## 2021-01-01 RX ADMIN — ALBUTEROL SULFATE 4 PUFF: 90 AEROSOL, METERED RESPIRATORY (INHALATION) at 03:28

## 2021-01-01 RX ADMIN — ALBUTEROL SULFATE 4 PUFF: 90 AEROSOL, METERED RESPIRATORY (INHALATION) at 12:22

## 2021-01-01 RX ADMIN — VANCOMYCIN HYDROCHLORIDE 1750 MG: 5 INJECTION, POWDER, LYOPHILIZED, FOR SOLUTION INTRAVENOUS at 09:38

## 2021-01-01 RX ADMIN — SODIUM CHLORIDE, PRESERVATIVE FREE 10 ML: 5 INJECTION INTRAVENOUS at 09:54

## 2021-01-01 RX ADMIN — DOPAMINE HYDROCHLORIDE 8 MCG/KG/MIN: 160 INJECTION, SOLUTION INTRAVENOUS at 03:27

## 2021-01-01 RX ADMIN — APIXABAN 5 MG: 5 TABLET, FILM COATED ORAL at 08:04

## 2021-01-01 RX ADMIN — METHYLPREDNISOLONE SODIUM SUCCINATE 40 MG: 40 INJECTION, POWDER, FOR SOLUTION INTRAMUSCULAR; INTRAVENOUS at 09:58

## 2021-01-01 RX ADMIN — DOPAMINE HYDROCHLORIDE 8 MCG/KG/MIN: 160 INJECTION, SOLUTION INTRAVENOUS at 21:23

## 2021-01-01 RX ADMIN — PROPOFOL 65 MCG/KG/MIN: 10 INJECTION, EMULSION INTRAVENOUS at 08:15

## 2021-01-01 RX ADMIN — INSULIN LISPRO 6 UNITS: 100 INJECTION, SOLUTION INTRAVENOUS; SUBCUTANEOUS at 12:20

## 2021-01-01 RX ADMIN — CHLORTHALIDONE 25 MG: 25 TABLET ORAL at 09:42

## 2021-01-01 RX ADMIN — MICONAZOLE NITRATE: 20 POWDER TOPICAL at 10:26

## 2021-01-01 RX ADMIN — SODIUM CHLORIDE, PRESERVATIVE FREE 10 ML: 5 INJECTION INTRAVENOUS at 20:41

## 2021-01-01 RX ADMIN — PROPOFOL 20 MCG/KG/MIN: 10 INJECTION, EMULSION INTRAVENOUS at 00:28

## 2021-01-01 RX ADMIN — ALBUTEROL SULFATE 4 PUFF: 90 AEROSOL, METERED RESPIRATORY (INHALATION) at 19:45

## 2021-01-01 RX ADMIN — NOREPINEPHRINE BITARTRATE 20 MCG/MIN: 1 INJECTION, SOLUTION, CONCENTRATE INTRAVENOUS at 04:33

## 2021-01-01 RX ADMIN — SODIUM BICARBONATE: 84 INJECTION, SOLUTION INTRAVENOUS at 17:31

## 2021-01-01 RX ADMIN — Medication: at 11:42

## 2021-01-01 RX ADMIN — CALCIUM GLUCONATE 1000 MG: 20 INJECTION, SOLUTION INTRAVENOUS at 01:24

## 2021-01-01 RX ADMIN — ASPIRIN 81 MG CHEWABLE TABLET 81 MG: 81 TABLET CHEWABLE at 10:02

## 2021-01-01 RX ADMIN — SODIUM CHLORIDE, PRESERVATIVE FREE 10 ML: 5 INJECTION INTRAVENOUS at 09:19

## 2021-01-01 RX ADMIN — PROPOFOL 40 MCG/KG/MIN: 10 INJECTION, EMULSION INTRAVENOUS at 02:32

## 2021-01-01 RX ADMIN — INSULIN LISPRO 6 UNITS: 100 INJECTION, SOLUTION INTRAVENOUS; SUBCUTANEOUS at 12:50

## 2021-01-01 RX ADMIN — CALCIUM GLUCONATE 1000 MG: 20 INJECTION, SOLUTION INTRAVENOUS at 22:43

## 2021-01-01 RX ADMIN — ALBUTEROL SULFATE 2 PUFF: 90 AEROSOL, METERED RESPIRATORY (INHALATION) at 02:25

## 2021-01-01 RX ADMIN — DEXAMETHASONE SODIUM PHOSPHATE 6 MG: 4 INJECTION, SOLUTION INTRAMUSCULAR; INTRAVENOUS at 08:44

## 2021-01-01 RX ADMIN — INSULIN LISPRO 36 UNITS: 100 INJECTION, SOLUTION INTRAVENOUS; SUBCUTANEOUS at 17:15

## 2021-01-01 RX ADMIN — SODIUM CHLORIDE, PRESERVATIVE FREE 10 ML: 5 INJECTION INTRAVENOUS at 20:36

## 2021-01-01 RX ADMIN — CEFEPIME 2 G: 2 INJECTION, POWDER, FOR SOLUTION INTRAVENOUS at 09:16

## 2021-01-01 RX ADMIN — FENOFIBRATE 160 MG: 160 TABLET ORAL at 09:54

## 2021-01-01 RX ADMIN — ALBUMIN (HUMAN) 12.5 G: 0.25 INJECTION, SOLUTION INTRAVENOUS at 05:10

## 2021-01-01 RX ADMIN — CALCIUM GLUCONATE 1000 MG: 20 INJECTION, SOLUTION INTRAVENOUS at 22:58

## 2021-01-01 RX ADMIN — Medication: at 14:57

## 2021-01-01 RX ADMIN — DIAZEPAM 5 MG: 5 TABLET ORAL at 10:04

## 2021-01-01 RX ADMIN — DOPAMINE HYDROCHLORIDE 10 MCG/KG/MIN: 160 INJECTION, SOLUTION INTRAVENOUS at 16:52

## 2021-01-01 RX ADMIN — VANCOMYCIN HYDROCHLORIDE 1750 MG: 5 INJECTION, POWDER, LYOPHILIZED, FOR SOLUTION INTRAVENOUS at 09:17

## 2021-01-01 RX ADMIN — PROPOFOL 60 MCG/KG/MIN: 10 INJECTION, EMULSION INTRAVENOUS at 07:22

## 2021-01-01 RX ADMIN — Medication 1600 ML/HR: at 05:30

## 2021-01-01 RX ADMIN — MIDAZOLAM HYDROCHLORIDE 10 MG/HR: 5 INJECTION, SOLUTION INTRAMUSCULAR; INTRAVENOUS at 18:49

## 2021-01-01 RX ADMIN — GUAIFENESIN AND DEXTROMETHORPHAN 5 ML: 100; 10 SYRUP ORAL at 18:29

## 2021-01-01 RX ADMIN — PROPOFOL 60 MCG/KG/MIN: 10 INJECTION, EMULSION INTRAVENOUS at 11:51

## 2021-01-01 RX ADMIN — PROPOFOL 70 MCG/KG/MIN: 10 INJECTION, EMULSION INTRAVENOUS at 14:12

## 2021-01-01 RX ADMIN — ALBUTEROL SULFATE 4 PUFF: 90 AEROSOL, METERED RESPIRATORY (INHALATION) at 08:00

## 2021-01-01 RX ADMIN — METHYLPREDNISOLONE SODIUM SUCCINATE 40 MG: 40 INJECTION, POWDER, FOR SOLUTION INTRAMUSCULAR; INTRAVENOUS at 02:43

## 2021-01-01 RX ADMIN — EZETIMIBE 10 MG: 10 TABLET ORAL at 09:23

## 2021-01-01 RX ADMIN — LOSARTAN POTASSIUM 100 MG: 50 TABLET, FILM COATED ORAL at 17:58

## 2021-01-01 RX ADMIN — INSULIN LISPRO 6 UNITS: 100 INJECTION, SOLUTION INTRAVENOUS; SUBCUTANEOUS at 05:50

## 2021-01-01 RX ADMIN — CEFEPIME 2000 MG: 2 INJECTION, POWDER, FOR SOLUTION INTRAVENOUS at 19:10

## 2021-01-01 RX ADMIN — PROPOFOL 60 MCG/KG/MIN: 10 INJECTION, EMULSION INTRAVENOUS at 20:58

## 2021-01-01 RX ADMIN — DOPAMINE HYDROCHLORIDE 11 MCG/KG/MIN: 160 INJECTION, SOLUTION INTRAVENOUS at 15:05

## 2021-01-01 RX ADMIN — MICONAZOLE NITRATE: 20 POWDER TOPICAL at 08:07

## 2021-01-01 RX ADMIN — FAMOTIDINE 20 MG: 10 INJECTION, SOLUTION INTRAVENOUS at 10:23

## 2021-01-01 RX ADMIN — ALBUTEROL SULFATE 4 PUFF: 90 AEROSOL, METERED RESPIRATORY (INHALATION) at 12:05

## 2021-01-01 RX ADMIN — CALCIUM GLUCONATE 2000 MG: 98 INJECTION, SOLUTION INTRAVENOUS at 04:23

## 2021-01-01 RX ADMIN — Medication 200 MCG/HR: at 15:01

## 2021-01-01 RX ADMIN — CHLORHEXIDINE GLUCONATE 0.12% ORAL RINSE 15 ML: 1.2 LIQUID ORAL at 21:21

## 2021-01-01 RX ADMIN — CONIVAPTAN HYDROCHLORIDE 20 MG: 20 INJECTION, SOLUTION INTRAVENOUS at 19:37

## 2021-01-01 RX ADMIN — FAMOTIDINE 20 MG: 10 INJECTION, SOLUTION INTRAVENOUS at 08:05

## 2021-01-01 RX ADMIN — INSULIN GLARGINE 60 UNITS: 100 INJECTION, SOLUTION SUBCUTANEOUS at 21:05

## 2021-01-01 RX ADMIN — ASPIRIN 81 MG CHEWABLE TABLET 81 MG: 81 TABLET CHEWABLE at 08:09

## 2021-01-01 RX ADMIN — Medication 4 PUFF: at 04:59

## 2021-01-01 RX ADMIN — INSULIN LISPRO 6 UNITS: 100 INJECTION, SOLUTION INTRAVENOUS; SUBCUTANEOUS at 06:19

## 2021-01-01 RX ADMIN — SODIUM CHLORIDE, PRESERVATIVE FREE 10 ML: 5 INJECTION INTRAVENOUS at 09:18

## 2021-01-01 RX ADMIN — REMDESIVIR 100 MG: 100 INJECTION, POWDER, LYOPHILIZED, FOR SOLUTION INTRAVENOUS at 22:22

## 2021-01-01 RX ADMIN — ALBUTEROL SULFATE 4 PUFF: 90 AEROSOL, METERED RESPIRATORY (INHALATION) at 07:45

## 2021-01-01 RX ADMIN — CHLORHEXIDINE GLUCONATE 0.12% ORAL RINSE 15 ML: 1.2 LIQUID ORAL at 08:24

## 2021-01-01 RX ADMIN — PROPOFOL 65 MCG/KG/MIN: 10 INJECTION, EMULSION INTRAVENOUS at 09:47

## 2021-01-01 RX ADMIN — Medication 4 PUFF: at 19:45

## 2021-01-01 RX ADMIN — PROPOFOL 60 MCG/KG/MIN: 10 INJECTION, EMULSION INTRAVENOUS at 00:34

## 2021-01-01 RX ADMIN — FAMOTIDINE 20 MG: 10 INJECTION, SOLUTION INTRAVENOUS at 20:58

## 2021-01-01 RX ADMIN — PROPOFOL 40 MCG/KG/MIN: 10 INJECTION, EMULSION INTRAVENOUS at 02:23

## 2021-01-01 RX ADMIN — PROPOFOL 20 MCG/KG/MIN: 10 INJECTION, EMULSION INTRAVENOUS at 07:22

## 2021-01-01 RX ADMIN — CHLORHEXIDINE GLUCONATE 0.12% ORAL RINSE 15 ML: 1.2 LIQUID ORAL at 08:45

## 2021-01-01 RX ADMIN — LEVOTHYROXINE SODIUM 75 MCG: 75 TABLET ORAL at 06:15

## 2021-01-01 RX ADMIN — Medication 4 PUFF: at 16:30

## 2021-01-01 RX ADMIN — FAMOTIDINE 20 MG: 10 INJECTION, SOLUTION INTRAVENOUS at 21:06

## 2021-01-01 RX ADMIN — PROPOFOL 60 MCG/KG/MIN: 10 INJECTION, EMULSION INTRAVENOUS at 04:39

## 2021-01-01 RX ADMIN — SODIUM CHLORIDE, PRESERVATIVE FREE 10 ML: 5 INJECTION INTRAVENOUS at 21:06

## 2021-01-01 RX ADMIN — SODIUM CHLORIDE, PRESERVATIVE FREE 10 ML: 5 INJECTION INTRAVENOUS at 21:07

## 2021-01-01 RX ADMIN — Medication 4 PUFF: at 16:44

## 2021-01-01 RX ADMIN — PROPOFOL 60 MCG/KG/MIN: 10 INJECTION, EMULSION INTRAVENOUS at 00:39

## 2021-01-01 RX ADMIN — Medication 1600 ML/HR: at 23:59

## 2021-01-01 RX ADMIN — INSULIN LISPRO 12 UNITS: 100 INJECTION, SOLUTION INTRAVENOUS; SUBCUTANEOUS at 09:00

## 2021-01-01 RX ADMIN — DOPAMINE HYDROCHLORIDE 6 MCG/KG/MIN: 160 INJECTION, SOLUTION INTRAVENOUS at 17:48

## 2021-01-01 RX ADMIN — CEFEPIME 2000 MG: 2 INJECTION, POWDER, FOR SOLUTION INTRAVENOUS at 11:31

## 2021-01-01 RX ADMIN — DOPAMINE HYDROCHLORIDE 13.5 MCG/KG/MIN: 160 INJECTION, SOLUTION INTRAVENOUS at 21:54

## 2021-01-01 RX ADMIN — ALBUTEROL SULFATE 4 PUFF: 90 AEROSOL, METERED RESPIRATORY (INHALATION) at 15:40

## 2021-01-01 RX ADMIN — Medication 200 MCG/HR: at 09:34

## 2021-01-01 RX ADMIN — LEVOTHYROXINE SODIUM 75 MCG: 75 TABLET ORAL at 10:23

## 2021-01-01 RX ADMIN — DOPAMINE HYDROCHLORIDE 7 MCG/KG/MIN: 160 INJECTION, SOLUTION INTRAVENOUS at 08:06

## 2021-01-01 RX ADMIN — HEPARIN SODIUM 5000 UNITS: 5000 INJECTION INTRAVENOUS; SUBCUTANEOUS at 15:00

## 2021-01-01 RX ADMIN — INSULIN LISPRO 20 UNITS: 100 INJECTION, SOLUTION INTRAVENOUS; SUBCUTANEOUS at 20:49

## 2021-01-01 RX ADMIN — SODIUM CHLORIDE 3 MCG/KG/MIN: 9 INJECTION, SOLUTION INTRAVENOUS at 08:33

## 2021-01-01 RX ADMIN — CHLORHEXIDINE GLUCONATE 0.12% ORAL RINSE 15 ML: 1.2 LIQUID ORAL at 20:48

## 2021-01-01 RX ADMIN — Medication 4 PUFF: at 08:31

## 2021-01-01 RX ADMIN — Medication 200 MCG/HR: at 05:44

## 2021-01-01 RX ADMIN — ATORVASTATIN CALCIUM 10 MG: 10 TABLET, FILM COATED ORAL at 08:06

## 2021-01-01 RX ADMIN — MESALAMINE 800 MG: 400 CAPSULE, DELAYED RELEASE ORAL at 20:58

## 2021-01-01 RX ADMIN — Medication 200 ML/HR: at 15:07

## 2021-01-01 RX ADMIN — Medication 200 MCG/HR: at 21:00

## 2021-01-01 RX ADMIN — SODIUM CHLORIDE, PRESERVATIVE FREE 10 ML: 5 INJECTION INTRAVENOUS at 08:06

## 2021-01-01 RX ADMIN — CALCIUM GLUCONATE 1000 MG: 20 INJECTION, SOLUTION INTRAVENOUS at 16:53

## 2021-01-01 RX ADMIN — FAMOTIDINE 20 MG: 10 INJECTION, SOLUTION INTRAVENOUS at 21:31

## 2021-01-01 RX ADMIN — ALBUTEROL SULFATE 4 PUFF: 90 AEROSOL, METERED RESPIRATORY (INHALATION) at 17:05

## 2021-01-01 RX ADMIN — DOPAMINE HYDROCHLORIDE 8 MCG/KG/MIN: 160 INJECTION, SOLUTION INTRAVENOUS at 06:57

## 2021-01-01 RX ADMIN — PROPOFOL 80 MCG/KG/MIN: 10 INJECTION, EMULSION INTRAVENOUS at 01:35

## 2021-01-01 RX ADMIN — Medication 4 PUFF: at 12:04

## 2021-01-01 RX ADMIN — ALBUTEROL SULFATE 4 PUFF: 90 AEROSOL, METERED RESPIRATORY (INHALATION) at 15:11

## 2021-01-01 RX ADMIN — MICONAZOLE NITRATE: 20 POWDER TOPICAL at 08:11

## 2021-01-01 RX ADMIN — Medication 4 PUFF: at 20:46

## 2021-01-01 RX ADMIN — METHYLPREDNISOLONE SODIUM SUCCINATE 40 MG: 40 INJECTION, POWDER, FOR SOLUTION INTRAMUSCULAR; INTRAVENOUS at 02:10

## 2021-01-01 RX ADMIN — SODIUM CHLORIDE, PRESERVATIVE FREE 10 ML: 5 INJECTION INTRAVENOUS at 20:30

## 2021-01-01 RX ADMIN — Medication 4 PUFF: at 11:45

## 2021-01-01 RX ADMIN — CHLORHEXIDINE GLUCONATE 0.12% ORAL RINSE 15 ML: 1.2 LIQUID ORAL at 09:18

## 2021-01-01 RX ADMIN — PROPOFOL 25 MCG/KG/MIN: 10 INJECTION, EMULSION INTRAVENOUS at 10:26

## 2021-01-01 RX ADMIN — Medication 1600 ML/HR: at 13:52

## 2021-01-01 RX ADMIN — DOPAMINE HYDROCHLORIDE 8 MCG/KG/MIN: 160 INJECTION, SOLUTION INTRAVENOUS at 09:51

## 2021-01-01 RX ADMIN — MICONAZOLE NITRATE: 20 POWDER TOPICAL at 15:33

## 2021-01-01 RX ADMIN — ALBUTEROL SULFATE 4 PUFF: 90 AEROSOL, METERED RESPIRATORY (INHALATION) at 12:12

## 2021-01-01 RX ADMIN — LEVOTHYROXINE SODIUM 75 MCG: 75 TABLET ORAL at 06:30

## 2021-01-01 RX ADMIN — ALBUTEROL SULFATE 4 PUFF: 90 AEROSOL, METERED RESPIRATORY (INHALATION) at 04:59

## 2021-01-01 RX ADMIN — DOPAMINE HYDROCHLORIDE 13 MCG/KG/MIN: 160 INJECTION, SOLUTION INTRAVENOUS at 17:10

## 2021-01-01 RX ADMIN — INSULIN LISPRO 6 UNITS: 100 INJECTION, SOLUTION INTRAVENOUS; SUBCUTANEOUS at 16:33

## 2021-01-01 RX ADMIN — PHENYLEPHRINE HYDROCHLORIDE 30 MCG/MIN: 10 INJECTION INTRAVENOUS at 10:39

## 2021-01-01 RX ADMIN — CHLORHEXIDINE GLUCONATE 0.12% ORAL RINSE 15 ML: 1.2 LIQUID ORAL at 08:56

## 2021-01-01 RX ADMIN — APIXABAN 5 MG: 5 TABLET, FILM COATED ORAL at 20:54

## 2021-01-01 RX ADMIN — CEFEPIME 2 G: 2 INJECTION, POWDER, FOR SOLUTION INTRAVENOUS at 09:29

## 2021-01-01 RX ADMIN — LEVOTHYROXINE SODIUM 75 MCG: 75 TABLET ORAL at 06:54

## 2021-01-01 RX ADMIN — Medication 200 MCG/HR: at 02:20

## 2021-01-01 RX ADMIN — PROPOFOL 30 MCG/KG/MIN: 10 INJECTION, EMULSION INTRAVENOUS at 19:20

## 2021-01-01 RX ADMIN — METHYLPREDNISOLONE SODIUM SUCCINATE 40 MG: 40 INJECTION, POWDER, FOR SOLUTION INTRAMUSCULAR; INTRAVENOUS at 06:30

## 2021-01-01 RX ADMIN — PROPOFOL 30 MCG/KG/MIN: 10 INJECTION, EMULSION INTRAVENOUS at 14:47

## 2021-01-01 RX ADMIN — DEXTROSE MONOHYDRATE: 100 INJECTION, SOLUTION INTRAVENOUS at 16:20

## 2021-01-01 RX ADMIN — DOPAMINE HYDROCHLORIDE 10 MCG/KG/MIN: 160 INJECTION, SOLUTION INTRAVENOUS at 01:17

## 2021-01-01 RX ADMIN — FAMOTIDINE 20 MG: 10 INJECTION, SOLUTION INTRAVENOUS at 09:23

## 2021-01-01 RX ADMIN — APIXABAN 5 MG: 5 TABLET, FILM COATED ORAL at 09:25

## 2021-01-01 RX ADMIN — FAMOTIDINE 20 MG: 10 INJECTION, SOLUTION INTRAVENOUS at 21:23

## 2021-01-01 RX ADMIN — DOPAMINE HYDROCHLORIDE 8 MCG/KG/MIN: 160 INJECTION, SOLUTION INTRAVENOUS at 15:02

## 2021-01-01 RX ADMIN — SODIUM BICARBONATE: 84 INJECTION, SOLUTION INTRAVENOUS at 10:45

## 2021-01-01 RX ADMIN — DIAZEPAM 5 MG: 5 TABLET ORAL at 20:59

## 2021-01-01 RX ADMIN — SODIUM CHLORIDE 3 MCG/KG/MIN: 9 INJECTION, SOLUTION INTRAVENOUS at 09:53

## 2021-01-01 RX ADMIN — INSULIN LISPRO 6 UNITS: 100 INJECTION, SOLUTION INTRAVENOUS; SUBCUTANEOUS at 20:19

## 2021-01-01 RX ADMIN — REMDESIVIR 100 MG: 100 INJECTION, POWDER, LYOPHILIZED, FOR SOLUTION INTRAVENOUS at 21:57

## 2021-01-01 RX ADMIN — SODIUM BICARBONATE: 84 INJECTION, SOLUTION INTRAVENOUS at 23:45

## 2021-01-01 RX ADMIN — PROPOFOL 40 MCG/KG/MIN: 10 INJECTION, EMULSION INTRAVENOUS at 01:17

## 2021-01-01 RX ADMIN — INSULIN LISPRO 12 UNITS: 100 INJECTION, SOLUTION INTRAVENOUS; SUBCUTANEOUS at 17:00

## 2021-01-01 RX ADMIN — ASPIRIN 81 MG CHEWABLE TABLET 81 MG: 81 TABLET CHEWABLE at 10:23

## 2021-01-01 RX ADMIN — SODIUM CHLORIDE, PRESERVATIVE FREE 10 ML: 5 INJECTION INTRAVENOUS at 10:40

## 2021-01-01 RX ADMIN — INSULIN LISPRO 6 UNITS: 100 INJECTION, SOLUTION INTRAVENOUS; SUBCUTANEOUS at 17:49

## 2021-01-01 RX ADMIN — APIXABAN 5 MG: 5 TABLET, FILM COATED ORAL at 20:59

## 2021-01-01 RX ADMIN — MEROPENEM 1000 MG: 1 INJECTION, POWDER, FOR SOLUTION INTRAVENOUS at 11:46

## 2021-01-01 RX ADMIN — CALCIUM GLUCONATE 1000 MG: 20 INJECTION, SOLUTION INTRAVENOUS at 13:54

## 2021-01-01 RX ADMIN — PROPOFOL 40 MCG/KG/MIN: 10 INJECTION, EMULSION INTRAVENOUS at 06:54

## 2021-01-01 RX ADMIN — INSULIN LISPRO 12 UNITS: 100 INJECTION, SOLUTION INTRAVENOUS; SUBCUTANEOUS at 11:58

## 2021-01-01 RX ADMIN — INSULIN HUMAN 40 UNITS: 100 INJECTION, SUSPENSION SUBCUTANEOUS at 09:26

## 2021-01-01 RX ADMIN — METOCLOPRAMIDE 10 MG: 5 INJECTION, SOLUTION INTRAMUSCULAR; INTRAVENOUS at 14:42

## 2021-01-01 RX ADMIN — Medication 100 MCG/HR: at 00:57

## 2021-01-01 RX ADMIN — ALBUTEROL SULFATE 4 PUFF: 90 AEROSOL, METERED RESPIRATORY (INHALATION) at 11:36

## 2021-01-01 RX ADMIN — ALBUTEROL SULFATE 4 PUFF: 90 AEROSOL, METERED RESPIRATORY (INHALATION) at 14:54

## 2021-01-01 RX ADMIN — CEFEPIME 2 G: 2 INJECTION, POWDER, FOR SOLUTION INTRAVENOUS at 21:53

## 2021-01-01 RX ADMIN — Medication 4 PUFF: at 00:06

## 2021-01-01 RX ADMIN — DOPAMINE HYDROCHLORIDE 8 MCG/KG/MIN: 160 INJECTION, SOLUTION INTRAVENOUS at 00:32

## 2021-01-01 RX ADMIN — ALBUTEROL SULFATE 4 PUFF: 90 AEROSOL, METERED RESPIRATORY (INHALATION) at 03:58

## 2021-01-01 RX ADMIN — SODIUM CHLORIDE, PRESERVATIVE FREE 10 ML: 5 INJECTION INTRAVENOUS at 20:20

## 2021-01-01 RX ADMIN — INSULIN LISPRO 6 UNITS: 100 INJECTION, SOLUTION INTRAVENOUS; SUBCUTANEOUS at 10:02

## 2021-01-01 RX ADMIN — ALBUTEROL SULFATE 4 PUFF: 90 AEROSOL, METERED RESPIRATORY (INHALATION) at 15:42

## 2021-01-01 RX ADMIN — Medication 100 MCG/HR: at 23:33

## 2021-01-01 RX ADMIN — SODIUM CHLORIDE, PRESERVATIVE FREE 10 ML: 5 INJECTION INTRAVENOUS at 21:00

## 2021-01-01 RX ADMIN — Medication 4 PUFF: at 12:10

## 2021-01-01 RX ADMIN — Medication 200 MCG/HR: at 03:47

## 2021-01-01 RX ADMIN — Medication 4 PUFF: at 01:02

## 2021-01-01 RX ADMIN — SODIUM CHLORIDE, PRESERVATIVE FREE 10 ML: 5 INJECTION INTRAVENOUS at 02:33

## 2021-01-01 RX ADMIN — ASPIRIN 81 MG CHEWABLE TABLET 81 MG: 81 TABLET CHEWABLE at 17:58

## 2021-01-01 RX ADMIN — ASPIRIN 81 MG CHEWABLE TABLET 81 MG: 81 TABLET CHEWABLE at 09:41

## 2021-01-01 RX ADMIN — MESALAMINE 800 MG: 400 CAPSULE, DELAYED RELEASE ORAL at 10:40

## 2021-01-01 RX ADMIN — LEVOTHYROXINE SODIUM 75 MCG: 75 TABLET ORAL at 05:10

## 2021-01-01 RX ADMIN — PROPOFOL 59.96 MCG/KG/MIN: 10 INJECTION, EMULSION INTRAVENOUS at 17:46

## 2021-01-01 RX ADMIN — ALBUTEROL SULFATE 4 PUFF: 90 AEROSOL, METERED RESPIRATORY (INHALATION) at 19:38

## 2021-01-01 RX ADMIN — PROPOFOL 65 MCG/KG/MIN: 10 INJECTION, EMULSION INTRAVENOUS at 11:39

## 2021-01-01 RX ADMIN — PROPOFOL 20 MCG/KG/MIN: 10 INJECTION, EMULSION INTRAVENOUS at 20:34

## 2021-01-01 RX ADMIN — Medication 100 MCG/HR: at 11:30

## 2021-01-01 RX ADMIN — Medication 50 MCG/HR: at 18:32

## 2021-01-01 RX ADMIN — Medication: at 07:52

## 2021-01-01 RX ADMIN — WHITE PETROLATUM: .15; .85 OINTMENT OPHTHALMIC at 09:30

## 2021-01-01 RX ADMIN — DOPAMINE HYDROCHLORIDE 6 MCG/KG/MIN: 160 INJECTION, SOLUTION INTRAVENOUS at 20:35

## 2021-01-01 RX ADMIN — PROPOFOL 65 MCG/KG/MIN: 10 INJECTION, EMULSION INTRAVENOUS at 20:07

## 2021-01-01 RX ADMIN — ACETAMINOPHEN 650 MG: 325 TABLET ORAL at 14:21

## 2021-01-01 RX ADMIN — HEPARIN SODIUM 5000 UNITS: 5000 INJECTION INTRAVENOUS; SUBCUTANEOUS at 05:52

## 2021-01-01 RX ADMIN — LIDOCAINE HYDROCHLORIDE ANHYDROUS 5 ML: 10 INJECTION, SOLUTION INFILTRATION at 13:37

## 2021-01-01 RX ADMIN — HEPARIN SODIUM 5000 UNITS: 5000 INJECTION INTRAVENOUS; SUBCUTANEOUS at 06:20

## 2021-01-01 RX ADMIN — ALBUMIN (HUMAN) 25 G: 0.25 INJECTION, SOLUTION INTRAVENOUS at 18:30

## 2021-01-01 RX ADMIN — SODIUM CHLORIDE, PRESERVATIVE FREE 10 ML: 5 INJECTION INTRAVENOUS at 06:54

## 2021-01-01 RX ADMIN — Medication: at 18:59

## 2021-01-01 RX ADMIN — Medication 100 MCG/HR: at 09:51

## 2021-01-01 RX ADMIN — ONDANSETRON 4 MG: 2 INJECTION INTRAMUSCULAR; INTRAVENOUS at 01:59

## 2021-01-01 RX ADMIN — CALCIUM GLUCONATE 1000 MG: 20 INJECTION, SOLUTION INTRAVENOUS at 09:13

## 2021-01-01 RX ADMIN — ALBUTEROL SULFATE 4 PUFF: 90 AEROSOL, METERED RESPIRATORY (INHALATION) at 01:02

## 2021-01-01 RX ADMIN — DOPAMINE HYDROCHLORIDE 9 MCG/KG/MIN: 160 INJECTION, SOLUTION INTRAVENOUS at 00:28

## 2021-01-01 RX ADMIN — FENOFIBRATE 160 MG: 160 TABLET ORAL at 17:58

## 2021-01-01 RX ADMIN — ONDANSETRON 4 MG: 2 INJECTION INTRAMUSCULAR; INTRAVENOUS at 11:00

## 2021-01-01 RX ADMIN — HEPARIN SODIUM 10000 UNITS: 1000 INJECTION INTRAVENOUS; SUBCUTANEOUS at 14:43

## 2021-01-01 RX ADMIN — SODIUM CHLORIDE 500 ML: 9 INJECTION, SOLUTION INTRAVENOUS at 19:54

## 2021-01-01 RX ADMIN — CALCIUM GLUCONATE 1000 MG: 20 INJECTION, SOLUTION INTRAVENOUS at 21:19

## 2021-01-01 RX ADMIN — CALCIUM GLUCONATE 1000 MG: 20 INJECTION, SOLUTION INTRAVENOUS at 21:12

## 2021-01-01 RX ADMIN — DEXAMETHASONE 6 MG: 4 TABLET ORAL at 10:39

## 2021-01-01 RX ADMIN — PROPOFOL 80 MCG/KG/MIN: 10 INJECTION, EMULSION INTRAVENOUS at 00:07

## 2021-01-01 RX ADMIN — SODIUM CHLORIDE, POTASSIUM CHLORIDE, SODIUM LACTATE AND CALCIUM CHLORIDE: 600; 310; 30; 20 INJECTION, SOLUTION INTRAVENOUS at 05:32

## 2021-01-01 RX ADMIN — INSULIN LISPRO 6 UNITS: 100 INJECTION, SOLUTION INTRAVENOUS; SUBCUTANEOUS at 13:05

## 2021-01-01 RX ADMIN — MESALAMINE 800 MG: 400 CAPSULE, DELAYED RELEASE ORAL at 21:00

## 2021-01-01 RX ADMIN — MESALAMINE 800 MG: 400 CAPSULE, DELAYED RELEASE ORAL at 21:45

## 2021-01-01 RX ADMIN — CEFEPIME 2 G: 2 INJECTION, POWDER, FOR SOLUTION INTRAVENOUS at 09:21

## 2021-01-01 RX ADMIN — INSULIN LISPRO 12 UNITS: 100 INJECTION, SOLUTION INTRAVENOUS; SUBCUTANEOUS at 21:21

## 2021-01-01 RX ADMIN — MESALAMINE 800 MG: 400 CAPSULE, DELAYED RELEASE ORAL at 15:00

## 2021-01-01 RX ADMIN — NOREPINEPHRINE BITARTRATE 19 MCG/MIN: 1 INJECTION, SOLUTION, CONCENTRATE INTRAVENOUS at 09:48

## 2021-01-01 RX ADMIN — PROPOFOL 60 MCG/KG/MIN: 10 INJECTION, EMULSION INTRAVENOUS at 22:35

## 2021-01-01 RX ADMIN — NOREPINEPHRINE BITARTRATE 20 MCG/MIN: 1 INJECTION, SOLUTION, CONCENTRATE INTRAVENOUS at 23:57

## 2021-01-01 RX ADMIN — Medication 4 PUFF: at 20:52

## 2021-01-01 RX ADMIN — DOPAMINE HYDROCHLORIDE 13.5 MCG/KG/MIN: 160 INJECTION, SOLUTION INTRAVENOUS at 04:56

## 2021-01-01 RX ADMIN — Medication 4 PUFF: at 08:21

## 2021-01-01 RX ADMIN — PROPOFOL 60 MCG/KG/MIN: 10 INJECTION, EMULSION INTRAVENOUS at 14:18

## 2021-01-01 RX ADMIN — Medication 4 PUFF: at 20:09

## 2021-01-01 RX ADMIN — FUROSEMIDE 40 MG: 10 INJECTION, SOLUTION INTRAMUSCULAR; INTRAVENOUS at 10:58

## 2021-01-01 RX ADMIN — PROPOFOL 45 MCG/KG/MIN: 10 INJECTION, EMULSION INTRAVENOUS at 17:17

## 2021-01-01 RX ADMIN — VANCOMYCIN HYDROCHLORIDE 1750 MG: 5 INJECTION, POWDER, LYOPHILIZED, FOR SOLUTION INTRAVENOUS at 10:05

## 2021-01-01 RX ADMIN — INSULIN GLARGINE 20 UNITS: 100 INJECTION, SOLUTION SUBCUTANEOUS at 22:59

## 2021-01-01 RX ADMIN — FENOFIBRATE 160 MG: 160 TABLET ORAL at 09:42

## 2021-01-01 RX ADMIN — HEPARIN SODIUM 5000 UNITS: 5000 INJECTION INTRAVENOUS; SUBCUTANEOUS at 16:09

## 2021-01-01 RX ADMIN — SODIUM CHLORIDE, PRESERVATIVE FREE 10 ML: 5 INJECTION INTRAVENOUS at 21:25

## 2021-01-01 RX ADMIN — VANCOMYCIN HYDROCHLORIDE 2000 MG: 1 INJECTION, POWDER, LYOPHILIZED, FOR SOLUTION INTRAVENOUS at 12:03

## 2021-01-01 RX ADMIN — CEFEPIME 2000 MG: 2 INJECTION, POWDER, FOR SOLUTION INTRAVENOUS at 03:30

## 2021-01-01 RX ADMIN — HEPARIN SODIUM 5000 UNITS: 5000 INJECTION INTRAVENOUS; SUBCUTANEOUS at 21:34

## 2021-01-01 RX ADMIN — ALBUTEROL SULFATE 4 PUFF: 90 AEROSOL, METERED RESPIRATORY (INHALATION) at 15:47

## 2021-01-01 RX ADMIN — HEPARIN SODIUM 5000 UNITS: 5000 INJECTION INTRAVENOUS; SUBCUTANEOUS at 22:24

## 2021-01-01 RX ADMIN — SODIUM CHLORIDE, PRESERVATIVE FREE 10 ML: 5 INJECTION INTRAVENOUS at 21:42

## 2021-01-01 RX ADMIN — HEPARIN SODIUM 5000 UNITS: 5000 INJECTION INTRAVENOUS; SUBCUTANEOUS at 14:06

## 2021-01-01 RX ADMIN — Medication 4 PUFF: at 10:49

## 2021-01-01 RX ADMIN — METOCLOPRAMIDE 10 MG: 5 INJECTION, SOLUTION INTRAMUSCULAR; INTRAVENOUS at 21:22

## 2021-01-01 SDOH — SOCIAL STABILITY: SOCIAL NETWORK: HOW OFTEN DO YOU GET TOGETHER WITH FRIENDS OR RELATIVES?: TWICE A WEEK

## 2021-01-01 SDOH — HEALTH STABILITY: MENTAL HEALTH
STRESS IS WHEN SOMEONE FEELS TENSE, NERVOUS, ANXIOUS, OR CAN'T SLEEP AT NIGHT BECAUSE THEIR MIND IS TROUBLED. HOW STRESSED ARE YOU?: VERY MUCH

## 2021-01-01 SDOH — ECONOMIC STABILITY: INCOME INSECURITY: HOW HARD IS IT FOR YOU TO PAY FOR THE VERY BASICS LIKE FOOD, HOUSING, MEDICAL CARE, AND HEATING?: NOT ASKED

## 2021-01-01 SDOH — ECONOMIC STABILITY: TRANSPORTATION INSECURITY
IN THE PAST 12 MONTHS, HAS THE LACK OF TRANSPORTATION KEPT YOU FROM MEDICAL APPOINTMENTS OR FROM GETTING MEDICATIONS?: NOT ASKED

## 2021-01-01 SDOH — SOCIAL STABILITY: SOCIAL NETWORK: ARE YOU MARRIED, WIDOWED, DIVORCED, SEPARATED, NEVER MARRIED, OR LIVING WITH A PARTNER?: WIDOWED

## 2021-01-01 SDOH — SOCIAL STABILITY: SOCIAL NETWORK: HOW OFTEN DO YOU ATTENT MEETINGS OF THE CLUB OR ORGANIZATION YOU BELONG TO?: NEVER

## 2021-01-01 SDOH — HEALTH STABILITY: PHYSICAL HEALTH: ON AVERAGE, HOW MANY DAYS PER WEEK DO YOU ENGAGE IN MODERATE TO STRENUOUS EXERCISE (LIKE A BRISK WALK)?: 0 DAYS

## 2021-01-01 SDOH — SOCIAL STABILITY: SOCIAL INSECURITY
WITHIN THE LAST YEAR, HAVE YOU BEEN KICKED, HIT, SLAPPED, OR OTHERWISE PHYSICALLY HURT BY YOUR PARTNER OR EX-PARTNER?: NOT ASKED

## 2021-01-01 SDOH — HEALTH STABILITY: MENTAL HEALTH: HOW MANY STANDARD DRINKS CONTAINING ALCOHOL DO YOU HAVE ON A TYPICAL DAY?: NOT ASKED

## 2021-01-01 SDOH — SOCIAL STABILITY: SOCIAL NETWORK
DO YOU BELONG TO ANY CLUBS OR ORGANIZATIONS SUCH AS CHURCH GROUPS UNIONS, FRATERNAL OR ATHLETIC GROUPS, OR SCHOOL GROUPS?: NO

## 2021-01-01 SDOH — ECONOMIC STABILITY: FOOD INSECURITY: WITHIN THE PAST 12 MONTHS, THE FOOD YOU BOUGHT JUST DIDN'T LAST AND YOU DIDN'T HAVE MONEY TO GET MORE.: NOT ASKED

## 2021-01-01 SDOH — SOCIAL STABILITY: SOCIAL INSECURITY: WITHIN THE LAST YEAR, HAVE YOU BEEN AFRAID OF YOUR PARTNER OR EX-PARTNER?: NOT ASKED

## 2021-01-01 SDOH — HEALTH STABILITY: MENTAL HEALTH: HOW OFTEN DO YOU HAVE A DRINK CONTAINING ALCOHOL?: NEVER

## 2021-01-01 ASSESSMENT — PULMONARY FUNCTION TESTS
PIF_VALUE: 25
PIF_VALUE: 24
PIF_VALUE: 35
PIF_VALUE: 25
PIF_VALUE: 27
PIF_VALUE: 22
PIF_VALUE: 26
PIF_VALUE: 28
PIF_VALUE: 41
PIF_VALUE: 24
PIF_VALUE: 24
PIF_VALUE: 30
PIF_VALUE: 28
PIF_VALUE: 25
PIF_VALUE: 30
PIF_VALUE: 16
PIF_VALUE: 25
PIF_VALUE: 19
PIF_VALUE: 25
PIF_VALUE: 34
PIF_VALUE: 29
PIF_VALUE: 36
PIF_VALUE: 18
PIF_VALUE: 23
PIF_VALUE: 21
PIF_VALUE: 24
PIF_VALUE: 28
PIF_VALUE: 15
PIF_VALUE: 29
PIF_VALUE: 24
PIF_VALUE: 25
PIF_VALUE: 21
PIF_VALUE: 25
PIF_VALUE: 27
PIF_VALUE: 25
PIF_VALUE: 13
PIF_VALUE: 24
PIF_VALUE: 25
PIF_VALUE: 36
PIF_VALUE: 24
PIF_VALUE: 22
PIF_VALUE: 25
PIF_VALUE: 27
PIF_VALUE: 22
PIF_VALUE: 24
PIF_VALUE: 25
PIF_VALUE: 21
PIF_VALUE: 37
PIF_VALUE: 27
PIF_VALUE: 24
PIF_VALUE: 22
PIF_VALUE: 27
PIF_VALUE: 25
PIF_VALUE: 21
PIF_VALUE: 25
PIF_VALUE: 18
PIF_VALUE: 20
PIF_VALUE: 15
PIF_VALUE: 25
PIF_VALUE: 27
PIF_VALUE: 19
PIF_VALUE: 39
PIF_VALUE: 26
PIF_VALUE: 24
PIF_VALUE: 27
PIF_VALUE: 25
PIF_VALUE: 16
PIF_VALUE: 12
PIF_VALUE: 30
PIF_VALUE: 26
PIF_VALUE: 9
PIF_VALUE: 25
PIF_VALUE: 24
PIF_VALUE: 25
PIF_VALUE: 24
PIF_VALUE: 25
PIF_VALUE: 26
PIF_VALUE: 27
PIF_VALUE: 26
PIF_VALUE: 28
PIF_VALUE: 25
PIF_VALUE: 25
PIF_VALUE: 14
PIF_VALUE: 25
PIF_VALUE: 27
PIF_VALUE: 25
PIF_VALUE: 16
PIF_VALUE: 36
PIF_VALUE: 42
PIF_VALUE: 38
PIF_VALUE: 21
PIF_VALUE: 37
PIF_VALUE: 21
PIF_VALUE: 24
PIF_VALUE: 19
PIF_VALUE: 24
PIF_VALUE: 26
PIF_VALUE: 25
PIF_VALUE: 28
PIF_VALUE: 25
PIF_VALUE: 23
PIF_VALUE: 25
PIF_VALUE: 26
PIF_VALUE: 25
PIF_VALUE: 25
PIF_VALUE: 20
PIF_VALUE: 26
PIF_VALUE: 28
PIF_VALUE: 37
PIF_VALUE: 22
PIF_VALUE: 24
PIF_VALUE: 26
PIF_VALUE: 19
PIF_VALUE: 24
PIF_VALUE: 20
PIF_VALUE: 26
PIF_VALUE: 27
PIF_VALUE: 26
PIF_VALUE: 28
PIF_VALUE: 24
PIF_VALUE: 35
PIF_VALUE: 30
PIF_VALUE: 8
PIF_VALUE: 28
PIF_VALUE: 29
PIF_VALUE: 20
PIF_VALUE: 21
PIF_VALUE: 28
PIF_VALUE: 24
PIF_VALUE: 28
PIF_VALUE: 28
PIF_VALUE: 23
PIF_VALUE: 17
PIF_VALUE: 27
PIF_VALUE: 16
PIF_VALUE: 20
PIF_VALUE: 23
PIF_VALUE: 35
PIF_VALUE: 25
PIF_VALUE: 10
PIF_VALUE: 22
PIF_VALUE: 35
PIF_VALUE: 21
PIF_VALUE: 26
PIF_VALUE: 26
PIF_VALUE: 25
PIF_VALUE: 33
PIF_VALUE: 30
PIF_VALUE: 23
PIF_VALUE: 25
PIF_VALUE: 21
PIF_VALUE: 21
PIF_VALUE: 28
PIF_VALUE: 27
PIF_VALUE: 23
PIF_VALUE: 24
PIF_VALUE: 27
PIF_VALUE: 27
PIF_VALUE: 22
PIF_VALUE: 14
PIF_VALUE: 34
PIF_VALUE: 16
PIF_VALUE: 25
PIF_VALUE: 34
PIF_VALUE: 25
PIF_VALUE: 24
PIF_VALUE: 26
PIF_VALUE: 26
PIF_VALUE: 24
PIF_VALUE: 26
PIF_VALUE: 21
PIF_VALUE: 16
PIF_VALUE: 24
PIF_VALUE: 25
PIF_VALUE: 20
PIF_VALUE: 25
PIF_VALUE: 15
PIF_VALUE: 26
PIF_VALUE: 21
PIF_VALUE: 21
PIF_VALUE: 24
PIF_VALUE: 20
PIF_VALUE: 25
PIF_VALUE: 29
PIF_VALUE: 25
PIF_VALUE: 24
PIF_VALUE: 25
PIF_VALUE: 29
PIF_VALUE: 20
PIF_VALUE: 19
PIF_VALUE: 22
PIF_VALUE: 24
PIF_VALUE: 23

## 2021-01-01 ASSESSMENT — PAIN SCALES - GENERAL
PAINLEVEL_OUTOF10: 5
PAINLEVEL_OUTOF10: 0
PAINLEVEL_OUTOF10: 3
PAINLEVEL_OUTOF10: 0
PAINLEVEL_OUTOF10: 5
PAINLEVEL_OUTOF10: 0

## 2021-01-08 PROBLEM — U07.1 COVID-19: Status: ACTIVE | Noted: 2021-01-01

## 2021-01-08 NOTE — ED PROVIDER NOTES
Emergency Department Encounter    Patient: Argentina Cohen  MRN: [id number]  : 1945  Date of Evaluation: 2021  ED Provider:  Anna Morgan    Triage Chief Complaint:   Shortness of Breath    [place]:  Argentina Cohen is a 67 y.o. male that presents shortness  of breath, generalized malaise and aching body joints. Patient has had the symptoms since Tuesday. Was sent in for further evaluation by his work. Endorses a cough as well. Denies any chest pain but does endorse diarrhea. Has had some chills and has felt feverish. Unknown sick contacts. Denies any nausea or vomiting. Patient has a history of hypertension, LVH, previous MI, AAA status post repair, diabetes, hyperlipidemia, triple coronary bypass surgery. ROS - see HPI, below listed is current ROS at time of my eval:  General:  No fevers, no chills, no weakness  Eyes:  No recent vison changes, no discharge  ENT:  No sore throat, no nasal congestion, no hearing changes  Cardiovascular:  No chest pain, no palpitations  Respiratory:  + shortness of breath, + cough, no wheezing  Gastrointestinal:  No pain, no nausea, no vomiting, + diarrhea  Musculoskeletal:  No muscle pain, no joint pain  Skin:  No rash, no pruritis, no easy bruising  Neurologic:  No speech problems, no headache, no extremity numbness, no extremity tingling, no extremity weakness  Psychiatric:  No anxiety  Genitourinary:  No dysuria, no hematuria  Endocrine:  No unexpected weight gain, no unexpected weight loss  Extremities:  no edema, no pain    No past medical history on file. No past surgical history on file. No family history on file.   Social History     Socioeconomic History    Marital status:      Spouse name: Not on file    Number of children: Not on file    Years of education: Not on file    Highest education level: Not on file   Occupational History    Not on file   Social Needs    Financial resource strain: Not on file    Food insecurity     Worry: Not on file     Inability: Not on file    Transportation needs     Medical: Not on file     Non-medical: Not on file   Tobacco Use    Smoking status: Not on file   Substance and Sexual Activity    Alcohol use: Not on file    Drug use: Not on file    Sexual activity: Not on file   Lifestyle    Physical activity     Days per week: Not on file     Minutes per session: Not on file    Stress: Not on file   Relationships    Social connections     Talks on phone: Not on file     Gets together: Not on file     Attends Nondenominational service: Not on file     Active member of club or organization: Not on file     Attends meetings of clubs or organizations: Not on file     Relationship status: Not on file    Intimate partner violence     Fear of current or ex partner: Not on file     Emotionally abused: Not on file     Physically abused: Not on file     Forced sexual activity: Not on file   Other Topics Concern    Not on file   Social History Narrative    Not on file     Current Facility-Administered Medications   Medication Dose Route Frequency Provider Last Rate Last Admin    0.9 % sodium chloride bolus  500 mL Intravenous Once Kaykay Fortune MD         No current outpatient medications on file. No Known Allergies    Nursing Notes Reviewed    Physical Exam:  Triage VS:    ED Triage Vitals [01/08/21 1657]   Enc Vitals Group      BP (!) 139/109      Pulse 82      Resp       Temp 100.4 °F (38 °C)      Temp Source Oral      SpO2       Weight (!) 342 lb (155.1 kg)      Height 6' (1.829 m)      Head Circumference       Peak Flow       Pain Score       Pain Loc       Pain Edu? Excl. in [address]? General appearance: Mild respiratory distress, tachypneic  Skin:  Warm. Dry. Eye:  Extraocular movements intact. Ears, nose, mouth and throat:  Oral mucosa moist   Neck:  Trachea midline. No meningismus  Extremity:  No swelling. Normal ROM     Heart:  Regular rate and rhythm, normal S1 & S2, no extra heart sounds. Perfusion:  intact  Respiratory: Diminished bilaterally, tachypneic requiring oxygen  Abdominal:  Normal bowel sounds. Soft. Nontender. Non distended. Back:  No CVA tenderness to palpation     Neurological:  Alert and oriented times 3. No focal neuro deficits.              Psychiatric:  Appropriate    I have reviewed and interpreted all of the currently available lab results from this visit (if applicable):  Results for orders placed or performed during the hospital encounter of 01/08/21   CBC Auto Differential   Result Value Ref Range    WBC 3.7 (L) 4.0 - 10.5 K/CU MM    RBC 3.83 (L) 4.6 - 6.2 M/CU MM    Hemoglobin 11.9 (L) 13.5 - 18.0 GM/DL    Hematocrit 35.8 (L) 42 - 52 %    MCV 93.5 78 - 100 FL    MCH 31.1 (H) 27 - 31 PG    MCHC 33.2 32.0 - 36.0 %    RDW 14.6 11.7 - 14.9 %    Platelets 408 696 - 873 K/CU MM    MPV 10.8 7.5 - 11.1 FL    Immature Neutrophil % 1.1 (H) 0 - 0.43 %    Segs Relative 72.5 (H) 36 - 66 %    Eosinophils % 0.0 0 - 3 %    Basophils % 0.3 0 - 1 %    Lymphocytes % 17.9 (L) 24 - 44 %    Monocytes % 8.2 (H) 0 - 4 %    Total Immature Neutrophil 0.04 K/CU MM    Segs Absolute 2.7 K/CU MM    Eosinophils Absolute 0.0 K/CU MM    Basophils Absolute 0.0 K/CU MM    Lymphocytes Absolute 0.7 K/CU MM    Monocytes Absolute 0.3 K/CU MM    Differential Type AUTOMATED DIFFERENTIAL    Basic Metabolic Panel w/ Reflex to MG   Result Value Ref Range    Sodium 139 135 - 145 MMOL/L    Potassium 3.9 3.5 - 5.1 MMOL/L    Chloride 102 99 - 110 mMol/L    CO2 21 21 - 32 MMOL/L    Anion Gap 16 4 - 16    BUN 41 (H) 6 - 23 MG/DL    CREATININE 1.4 (H) 0.9 - 1.3 MG/DL    Glucose 219 (H) 70 - 99 MG/DL    Calcium 8.9 8.3 - 10.6 MG/DL    GFR Non- 49 (L) >60 mL/min/1.73m2    GFR  60 (L) >60 mL/min/1.73m2   Troponin   Result Value Ref Range    Troponin T <0.010 <0.01 NG/ML   Brain Natriuretic Peptide   Result Value Ref Range    Pro-BNP 4,182 (H) <300 PG/ML   D-Dimer, Rapid   Result Value Ref Range    D-Dimer, Quant 2.26 (H) <0.47 ug/mL (FEU)   Lactic Acid, Plasma   Result Value Ref Range    Lactate 2.7 (HH) 0.4 - 2.0 mMOL/L   Protime-INR   Result Value Ref Range    Protime 13.3 11.7 - 14.5 SECONDS    INR 1.02 INDEX   COVID-19    Specimen: Nasopharyngeal Swab   Result Value Ref Range    Source THROAT     SARS-CoV-2, NAAT DETECTED (A)    EKG 12 Lead   Result Value Ref Range    Ventricular Rate 69 BPM    Atrial Rate 394 BPM    QRS Duration 80 ms    Q-T Interval 456 ms    QTc Calculation (Bazett) 488 ms    R Axis -1 degrees    T Axis 30 degrees    Diagnosis       Atrial fibrillation with a competing junctional pacemaker  Low voltage QRS  Possible Inferior infarct , age undetermined  Abnormal ECG  No previous ECGs available        Radiographs (if obtained):  Radiologist's Report Reviewed:  No results found. EKG (if obtained): (All EKG's are interpreted by myself in the absence of a cardiologist)  Ferd Locker atrial fibrillation, rate 69, normal axis, no ST depressions or elevations no previous EKG for comparison. MDM:  Patient presents with complaint of shortness of breath, myalgias and generalized malaise. Found to be in possible new onset atrial fibrillation. Patient found to be Covid positive. Hypoxic here requiring oxygen via nasal cannula. Elevated proBNP. Also ANASTASIA here. Given the patient's kidney injury a CT PE protocol was not pursued at this time. Patient may still have a potential for possible blood clot and may need a VQ scan during his admission. This will be left up to the discretion of the hospitalist.  Patient hospitalized for further work-up and management. Clinical Impression:  1. Dyspnea, unspecified type    2. Hypoxia    3. ANASTASIA (acute kidney injury) ([facility] [address]    4. COVID-19      Disposition referral (if applicable):  No follow-up provider specified.   Disposition medications (if applicable):  New Prescriptions    No medications on file     ED Provider Disposition Time  DISPOSITION        Comment: Please note this report has been produced using speech recognition software and may contain errors related to that system including errors in grammar, punctuation, and spelling, as well as words and phrases that may be inappropriate. Efforts were made to edit the dictations.       Jessica Feliciano MD  01/08/21 1941

## 2021-01-08 NOTE — ED TRIAGE NOTES
Patient states he has had cough and sob with aching in his joints. Patient states he only came here because his job made him to R/O covid 19.

## 2021-01-08 NOTE — DISCHARGE INSTR - COC
Continuity of Care Form    Patient Name: Jennifer Goncalves   :  1945  MRN:  [id number]    Admit date:  2021  Discharge date:  ***    Code Status Order: No Order   Advance Directives:     Admitting Physician:  No admitting provider for patient encounter. PCP: Марина Martinez MD    Discharging Nurse: [facility] Unit/Room#: ED-07/E07  Discharging Unit Phone Number: ***    Emergency Contact:   Extended Emergency Contact Information  Primary Emergency Contact: Albino Garcia  Home Phone: [phone]  Relation: Child  Secondary Emergency Contact: Lily Baeza, [address] Phone: [phone]  Relation: Grandchild    Past Surgical History:  No past surgical history on file. Immunization History: There is no immunization history on file for this patient. Active Problems: There is no problem list on file for this patient.       Isolation/Infection:   Isolation          No Isolation        Patient Infection Status     None to display          Nurse Assessment:  Last Vital Signs: BP (!) 139/109   Pulse 82   Temp 100.4 °F (38 °C) (Oral)   Resp 24   Ht 6' (1.829 m)   Wt (!) 342 lb (155.1 kg)   BMI 46.38 kg/m²     Last documented pain score (0-10 scale): Pain Level: 5  Last Weight:   Wt Readings from Last 1 Encounters:   21 (!) 342 lb (155.1 kg)     Mental Status:  {IP PT MENTAL STATUS:83282}    IV Access:  { MICHEAL IV ACCESS:460133840}    Nursing Mobility/ADLs:  Walking   {P DME RIPI:973435987}  Transfer  {P DME GPRY:420491675}  Bathing  {CHP DME USLJ:264581291}  Dressing  {CHP DME AOA}  Toileting  {P DME FVWB:846853000}  Feeding  {P DME EYNX:173845735}  Med Admin  {P DME CNSH:242516937}  Med Delivery   { MICHEAL MED Delivery:525410224}    Wound Care Documentation and Therapy:        Elimination:  Continence:   · Bowel: {YES / UC:83341}  · Bladder: {YES / DC:51595}  Urinary Catheter: {Urinary Catheter:993746896}   Colostomy/Ileostomy/Ileal Conduit: {YES / SR:35452}       Date of Last BM: ***  No intake or output data in the 24 hours ending 21 1756  No intake/output data recorded.     Safety Concerns:     [address] [facility] Safety Concerns:347957198}    Impairments/Disabilities:      [facility] Impairments/Disabilities:877183805}    Nutrition Therapy:  Current Nutrition Therapy:   [facility] Diet List:006058043}    Routes of Feeding: {CHP DME Other Feedings:745612447}  Liquids: {Slp liquid thickness:82023}  Daily Fluid Restriction: {CHP DME Yes amt example:825367199}  Last Modified Barium Swallow with Video (Video Swallowing Test): {Done Not Done QYSB:127660010}    Treatments at the Time of Hospital Discharge:   Respiratory Treatments: ***  Oxygen Therapy:  {Therapy; copd oxygen:93116}  Ventilator:    { CC Vent OVZJ:567081626}    Rehab Therapies: {THERAPEUTIC INTERVENTION:2853803832}  Weight Bearing Status/Restrictions: [address] [place] [address] Weight Bearin}  Other Medical Equipment (for information only, NOT a DME order):  {EQUIPMENT:073702240}  Other Treatments: ***    Patient's personal belongings (please select all that are sent with patient):  {[facility] DME Belongings:766222321}    RN SIGNATURE:  {Esignature:657158108}    CASE MANAGEMENT/SOCIAL WORK SECTION    Inpatient Status Date: ***    Readmission Risk Assessment Score:  Readmission Risk              Risk of Unplanned Readmission:        0           Discharging to Facility/ Agency   · Name:   · Address:  · Phone:  · Fax:    Dialysis Facility (if applicable)   · Name:  · Address:  · Dialysis Schedule:  · Phone:  · Fax:    / signature: {Esignature:285297057}    PHYSICIAN SECTION    Prognosis: {Prognosis:4111691449}    Condition at Discharge: [facility] Patient Condition:065030285}    Rehab Potential (if transferring to Rehab): {Prognosis:9928771981}    Recommended Labs or Other Treatments After Discharge: ***    Physician Certification: I certify the above information and transfer of [place]  is necessary for the continuing treatment

## 2021-01-09 NOTE — ED NOTES
The patient wants to speak to his son. I got a portable phone, called the son and gave the phone to the patient so he could talk to his son.       Tushar Meng RN  01/09/21 3179

## 2021-01-09 NOTE — ED NOTES
Pt transferred to negative pressure room d/t positive COVID. Pt ambulated w/o difficulty and SpO2 93% once placed back on monitor in new room. Pt resting comfortably with warm blanket and pillow denies any other needs at this time.       Phoenix Charles RN  01/08/21 6421

## 2021-01-09 NOTE — ED NOTES
Patient up to the bedside commode had a diarrhea stool. Reports that he has burning with urination now. A urine specimen was sent.      Claire Curiel RN  01/09/21 7419

## 2021-01-09 NOTE — ED NOTES
Bed: E09  Expected date:   Expected time:   Means of arrival:   Comments:  Clean @2230     Siva Maher RN  01/08/21 2224

## 2021-01-09 NOTE — ED NOTES
Contacted the nursing supervisor at [facility] due to the push back that we are getting from the hospitalist.  The supervisor will contact Dr. Nelli Horton.       Liborio Soulier, RN  01/09/21 6979

## 2021-01-09 NOTE — PROGRESS NOTES
Called by [employer] to place orders for patient boarded in [facility], awaiting transfer to [facility], pending transportation. I explained I am unable to place orders at this time as I am not physically present,and have not evaluated the patient myself. I spoke to Dr Marko Anders and asked to managed and stabilize patient till patient is under our care. I spoke to my PD regarding this, He will call back to [place] and I will proceed as advised.

## 2021-01-09 NOTE — ED PROVIDER NOTES
CARE RECEIVED FROM: Dr. Michelle Green  I reviewed the ackerman elements of the history, physical exam and initial treatment plan at the bedside. ANCILLARY DATA:  I reviewed the images.  Radiologist interpretation:   XR CHEST PORTABLE   Final Result   No acute finding in the chest.           Labs Reviewed   CBC WITH AUTO DIFFERENTIAL - Abnormal; Notable for the following components:       Result Value    WBC 3.7 (*)     RBC 3.83 (*)     Hemoglobin 11.9 (*)     Hematocrit 35.8 (*)     MCH 31.1 (*)     Immature Neutrophil % 1.1 (*)     Segs Relative 72.5 (*)     Lymphocytes % 17.9 (*)     Monocytes % 8.2 (*)     All other components within normal limits   BASIC METABOLIC PANEL W/ REFLEX TO MG FOR LOW K - Abnormal; Notable for the following components:    BUN 41 (*)     CREATININE 1.4 (*)     Glucose 219 (*)     GFR Non- 49 (*)     GFR  60 (*)     All other components within normal limits   BRAIN NATRIURETIC PEPTIDE - Abnormal; Notable for the following components:    Pro-BNP 4,182 (*)     All other components within normal limits   D-DIMER, RAPID - Abnormal; Notable for the following components:    D-Dimer, Quant 2.26 (*)     All other components within normal limits   URINALYSIS - Abnormal; Notable for the following components:    Ketones, Urine 5 (*)     Protein, UA TRACE (*)     Cast Type HYALINE CASTS (*)     All other components within normal limits   LACTIC ACID, PLASMA - Abnormal; Notable for the following components:    Lactate 2.7 (*)     All other components within normal limits   COVID-19 - Abnormal; Notable for the following components:    SARS-CoV-2, NAAT DETECTED (*)     All other components within normal limits   POC CRITICAL CARE PROFILE - Abnormal; Notable for the following components:    pO2, Arterial 53.4 (*)     HCO3, Arterial 23.1 (*)     O2 Sat 88.1 (*)     Sodium 137 (*)     POC CALCIUM 1.09 (*)     POC Glucose 296 (*)     Hematocrit 34.0 (*)     Hemoglobin 11.7 (*)     POC Creatinine 1.6 (*)     GFR Non- 44 (*)     GFR  53 (*)     All other components within normal limits   POCT GLUCOSE - Abnormal; Notable for the following components:    POC Glucose 279 (*)     All other components within normal limits   POCT GLUCOSE - Normal   POCT GLUCOSE - Normal   CULTURE, BLOOD 1   CULTURE, BLOOD 2   TROPONIN   PROTIME-INR   LACTATE, SEPSIS     MEDICAL DECISION MAKING / PLAN:  This is a 79-year-old male who presented the emergency department complaints of fevers, chills, allergies, cough, shortness of breath for 3 days. He was evaluated by daytime physician and ultimately found to have acute respiratory failure secondary to COVID-19 pneumonia. He had acute kidney injury as well with a creatinine of 1.4. His lactic acid was elevated at 2.7. In addition, he was noticed to have what appeared to be new onset atrial flutter versus atrial fibrillation. His D-dimer was significant elevated but CT of the chest was deferred given the acute kidney injury. Patient received IV fluid bolus and was given IV Decadron. He had been accepted by hospitalist service at [facility] but unfortunately transport times were significantly delayed due to availability of squads available to take the patient given his obesity. During his time in the emergency department I did repeat a BMP which showed slightly worsening creatinine of 1.6 but lactic acid had improved to 1.3. Given the worsening creatinine on BMP I did avoid administration of IV contrast and patient will likely require VQ scan at [facility].  I also initiated on maintenance fluids. In speaking with multiple transportation services and our access center it seems at the earliest time for patient to receive transport is at 1 PM on 01/09/2021. Patient will be signed out to daytime physician awaiting transfer. FINAL IMPRESSION:  1. Dyspnea, unspecified type    2.  ANASTASIA (acute kidney injury) ([facility]    3. COVID-19    4. Acute respiratory failure with hypoxia (HCC)    5. Type 2 diabetes mellitus with hyperglycemia, with long-term current use of insulin ([facility]      ? Electronically signed by:  [address] [facility], 1/9/2021        [address], DO  01/09/21 7743

## 2021-01-09 NOTE — ED PROVIDER NOTES
Addendum:  Patient reported to me by the night physician, Dr. Denisa Wiggins. Patient awaiting transport to [facility]. Plan for bariatric equipment related transfer around 1pm today. 9:40 am I spoke with Dr. Nia Mckeon, hospitalist, requesting that she place admission orders as patient has been here >17 hours. She reported that she is not willing to do that until the patient arrives at [facility]. 10:00 am I spoke with Dr. Stuart Medeiros,  for hospitalist. He also refused to place admission orders. 10:10 am I spoke with Dr. Ritesh Conley, medical director for the ED at [facility]. He will discuss with the hospitalists. He agrees that admission orders should be placed. 10:45 am Consultation with Dr. Stuart Medeiros, who has spoken with Dr. Christina Mc and Dr. Beatriz Fortune. He gave me orders for a regular insulin sliding scale, and discontinue IV fluids. Admission orders will be placed when patient arrives at [facility].     1:30 pm Transportation arrived.       Yaquelin Gabriel MD  01/09/21 7951

## 2021-01-09 NOTE — H&P
History and Physical      Name:  Janet Bach /Age/Sex: 1945  (67 y.o. male)   MRN & CSN:  [id number] & [id number] Admission Date/Time: 2021  4:54 PM   Location:  1969/7867-[id number] PCP: Jose Cloud MD       Hospital Day: 2    Assessment and Plan:   Janet Bach is a 67 y.o.  male  who presents with COVID-19    Acute Hypoxic Respiratory Failure:   · due to COVID 19  · Requiring O2 via NC  · CXR no acute process  · ABG pending  · Pulse oximetry continuously. COVID 19 Pneumonia  · Presented with SOB, fatigue 2020  · CXR as above, CT PE pending  · Inflammatory markers - elevated D dimer  · Started on Steroid  · Remdesivir  · Convalescent plasma  · DVT prophylaxis    Type 2 DM: on [place]jeo, Sliding scale. Hypoglycemia protocol. Accucheck. Endocrinology on consult  Coronary artery disease: Status post coronary artery bypass graft. Continue aspirin, Lipitor. Hyperlipidemia: continue statin  Hypertension: Blood pressure controlled. Continue medications. AMINA on CPAP  AAA S/P Endovascular Repair  Hypothyroidism: Will check TSH. continue Synthroid. Diet DIET CARDIAC;   DVT Prophylaxis [x] Lovenox, []  Heparin, [] SCDs, [] Warfarin  [] NOAC     GI Prophylaxis [x] PPI,  [] H2 Blocker,  [] Carafate,  [] Diet/Tube Feeds   Code Status Full Code   MDM [] Low, [] Moderate,[x]  High     History of Present Illness:     Chief Complaint: [id number]  Janet Bach is a 67 y.o.  male, with past medical history significant for CAD, HTN, HLD, AMINA, AAA, Hypothyroidism, who presented to the outside ED from home due due to shortness of breath. The present condition started 2021 as generalized body weakness, exertional shortness of breath, body aches, occasional cough. He also reported low-grade fever relieved by Tylenol. Persistence of symptoms prompted consult at the ED and subsequently transferred to our institution for further management.       Ten point ROS reviewed negative, unless as noted above    Objective: Intake/Output Summary (Last 24 hours) at 1/9/2021 1504  Last data filed at 1/9/2021 1320  Gross per 24 hour   Intake 500 ml   Output 800 ml   Net -300 ml      Vitals:   Vitals:    01/09/21 1430   BP: 137/62   Pulse: 58   Resp: 17   Temp: 98.1 °F (36.7 °C)   SpO2: 95%     Physical Exam:   GEN Awake male, oxygen via nasal cannula at 2 L. EYES Pupils are equally round. No scleral erythema, discharge, or conjunctivitis. HENT Mucous membranes are moist. Oral pharynx without exudates, no evidence of thrush. NECK Supple, no apparent thyromegaly or masses. RESP decreased breath sounds, no crackles, no wheezes. CARDIO/VASC S1/S2 auscultated. Regular rate and rhythm, No JVD. Peripheral pulses equal bilaterally and palpable. GI Abdomen is soft, no tenderness, masses, or guarding. Bowel sounds present. MSK No gross joint deformities. SKIN Normal coloration, warm, dry. NEURO Cranial nerves appear grossly intact, normal speech, no lateralizing weakness. PSYCH Awake, alert, oriented x 4. Past Medical History:      Past Medical History:   Diagnosis Date    Diabetes mellitus ([facility] [address]      PSHX:  has no past surgical history on file. Allergies: No Known Allergies    FAM HX: family history is not on file.   Soc HX:   Social History     Socioeconomic History    Marital status:      Spouse name: Not on file    Number of children: Not on file    Years of education: Not on file    Highest education level: Not on file   Occupational History    Not on file   Social Needs    Financial resource strain: Not on file    Food insecurity     Worry: Not on file     Inability: Not on file    Transportation needs     Medical: Not on file     Non-medical: Not on file   Tobacco Use    Smoking status: Not on file   Substance and Sexual Activity    Alcohol use: Not on file    Drug use: Not on file    Sexual activity: Not on file   Lifestyle    Physical activity     Days per week: Not on file Minutes per session: Not on file    Stress: Not on file   Relationships    Social connections     Talks on phone: Not on file     Gets together: Not on file     Attends Taoism service: Not on file     Active member of club or organization: Not on file     Attends meetings of clubs or organizations: Not on file     Relationship status: Not on file    Intimate partner violence     Fear of current or ex partner: Not on file     Emotionally abused: Not on file     Physically abused: Not on file     Forced sexual activity: Not on file   Other Topics Concern    Not on file   Social History Narrative    Not on file       Medications:     Home Medication   Prior to Admission medications    Not on File     Medications:    sodium chloride flush  10 mL Intravenous 2 times per day    famotidine  20 mg Oral BID    enoxaparin  30 mg Subcutaneous BID    dexamethasone  6 mg Oral Daily      Infusions:    lactated ringers Stopped (01/09/21 1202)    sodium chloride       PRN Meds:     sodium chloride flush, 10 mL, PRN      promethazine, 12.5 mg, Q6H PRN    Or      ondansetron, 4 mg, Q6H PRN      polyethylene glycol, 17 g, Daily PRN      acetaminophen, 650 mg, Q6H PRN    Or      acetaminophen, 650 mg, Q6H PRN      guaiFENesin-dextromethorphan, 5 mL, Q4H PRN      sodium chloride, , PRN        Recent Labs     01/08/21  1715 01/09/21  0245   WBC 3.7*  --    HGB 11.9* 11.7*   HCT 35.8* 34.0*     --       Recent Labs     01/08/21  1715 01/09/21  0245    137*   K 3.9 4.4     --    CO2 21 24   BUN 41*  --    CREATININE 1.4* 1.6*     No results for input(s): AST, ALT, ALB, BILIDIR, BILITOT, ALKPHOS in the last 72 hours.   Recent Labs     01/08/21  1715   INR 1.02     Recent Labs     01/08/21  [address] <0.010      Imaging reviewed    Electronically signed by Eloina Fitzpatrick MD on 1/9/2021 at 3:04 PM

## 2021-01-09 NOTE — ED NOTES
[facility] reach out to the hospitalist for morning lab orders.      Cruz Mac RN  01/09/21 0413

## 2021-01-09 NOTE — ED NOTES
Blood glucose 284. Offered the patient a meal tray, but he declines asking only for apple juice and that was provided. Patient reports that he has not taken any of his diabetic medicine since Wednesday.                   Diane Trimble RN  01/09/21 [id number]

## 2021-01-09 NOTE — ED NOTES
The patient was alert and oriented when he left this facility. He was able to stand and pivot to the ambulance cot. The patients belongings consisted of a wallet, pants, shirt, socks, shoes, and underwear. All belongings were sent with the patient.                       Juventino Dawn RN  01/09/21 [id number]

## 2021-01-09 NOTE — ED NOTES
Patient stood at the bedside and used the urinal 400 cc of urine.      Diane Trimble, SOCO  01/09/21 5487

## 2021-01-09 NOTE — CONSULTS
Pharmacy Consult for Remdesivir Initiation per Dr. Vicky Lutz for Use:  Covid (+) - Yes  Requiring supplemental oxygen - Yes  Requiring high-flow oxygen (>15L/min), non-invasive (BiPAP), or invasive mechanical ventilation - No  Use of 1 pressor or less - Yes  EGFR >30mL/min - Yes  ALT <5x ULN - Yes    Recommendations are consistent with the [facility] criteria for use published on the Hub with consideration from multiple clinical trials. https://hub.[facility]. org/sites/Quality/COVID-19/Clinical%20Care/Pharmaceutical/[facility]%20COVID%2019%20Treatment%20Summary%20-%20Hub%20Edition. pdf    Based on the above criteria, the patient is a candidate for remdesivir therapy. Remdesivir 200 mg on day 1 followed by 100 mg daily on days 2-5. The following test will also be ordered:  BMP x5 days  CBC x5 days  LFT x5 days    RENAL LAB EVALUATION  Estimated Creatinine Clearance: 60 mL/min (A) (based on SCr of 1.6 mg/dL (H)).   Recent Labs     01/08/21  1715 01/09/21  0245   BUN 41*  --    CREATININE 1.4* 1.6*       LIVER FUNCTION LAB EVALUATION  Recent Labs     01/08/21  1715   INR 1.02       PLATELETS  Platelets   Date Value Ref Range Status   01/08/2021 180 140 - 440 K/CU MM Final   10/29/2012 173 140 - 440 K/CU MM Final       Thank you for the consult,  Dewain Primrose, PharmD, BCPS

## 2021-01-09 NOTE — ED NOTES
Dr. Lesli Currie talking to Dr. Guero Chapman now trying to resolve who is responsible for putting in orders.      Ellen Kawasaki, RN  01/09/21 101

## 2021-01-09 NOTE — CONSULTS
Endocrinology   Consult Note  Dear Doctor Meghna Mayorga    Thank You for the Consult     Pt. Was Admitted for : Hypoxia, shortness of breath, Covid pneumonia    Reason for Consult: Better control of blood glucose      History Obtained From:  Patient/ EMR       HISTORY OF PRESENT ILLNESS:                The patient is a 67 y.o. male with significant past medical history of CAD, hypertension, hyperlipidemia, abdominal aortic aneurysm, hypothyroidism and obesity presented with symptoms of shortness of breath and hypoxia she was found to be positive for Covid infection and chest x-ray showed typical of Covid/viral pneumonia also complains of generalized body aches and pains exertional dyspnea and occasional cough and a low-grade fever. He is known diabetic and has been on oral hypoglycemic and insulin. I was  consulted for better control of blood glucose. ROS:   Pt's ROS done in detail. Abnormal ROS are noted in Medical and Surgical History Section below: Other Medical History:        Diagnosis Date    Diabetes mellitus ([facility] [address]      Surgical History:    No past surgical history on file. Allergies:  Patient has no known allergies. Family History:   No family history on file. REVIEW OF SYSTEMS:  Review of System Done as noted above     PHYSICAL EXAM:      Vitals:    /62   Pulse 58   Temp 98.1 °F (36.7 °C) (Oral)   Resp 18   Ht 6' (1.829 m)   Wt (!) 342 lb (155.1 kg)   SpO2 95%   BMI 46.38 kg/m²     CONSTITUTIONAL:  awake, alert, cooperative, appears stated age   EYES:  vision intact Fundoscopic Exam not performed   ENT:Normal  NECK:  Supple, No JVD.    Thyroid Exam:Normal   LUNGS:  Has Vesicular Breath Sounds, has wheezing and rales bilaterally  CARDIOVASCULAR:  Normal apical impulse, regular rate and rhythm, normal S1 and S2, no S3 or S4, and has no  murmur   ABDOMEN:  No scars, normal bowel sounds, soft, non-distended, non-tender, no masses palpated, no hepatolienomegaly  Musculoskeletal: Normal  Extremities: Normal, peripheral pulses normal, , has no edema   NEUROLOGIC:  Awake, alert, oriented to name, place and time. Cranial nerves II-XII are grossly intact. Motor is  intact. Sensory is intact. ,  and gait is normal.    DATA:    CBC:   Recent Labs     01/08/21  1715 01/09/21  0245   WBC 3.7*  --    HGB 11.9* 11.7*     --     CMP:  Recent Labs     01/08/21  1715 01/09/21  0245    137*   K 3.9 4.4     --    CO2 21 24   BUN 41*  --    CREATININE 1.4* 1.6*   CALCIUM 8.9  --      Lipids:   Lab Results   Component Value Date    CHOL 115 10/29/2012    HDL 39 10/29/2012    TRIG 80 10/29/2012     Glucose:   Recent Labs     01/09/21  0524 01/09/21  1104 01/09/21  1443   POCGLU 275* 284* 307*     Hemoglobin A1C: No results found for: LABA1C  Free T4:   Lab Results   Component Value Date    T4FREE 0.89 10/29/2012     Free T3: No results found for: FT3  TSH High Sensitivity:   Lab Results   Component Value Date    TSHHS 2.320 10/29/2012       Xr Chest Portable    Result Date: 1/8/2021  EXAMINATION: ONE XRAY VIEW OF THE CHEST 1/8/2021 6:12 pm COMPARISON: 02/17/2014 radiograph HISTORY: ORDERING SYSTEM PROVIDED HISTORY: sob TECHNOLOGIST PROVIDED HISTORY: Reason for exam:->sob Reason for Exam: sob Acuity: Acute Type of Exam: Initial Additional signs and symptoms: cough, body aches FINDINGS: Mild cardiomegaly. Sternotomy wires at prior surgical change of CABG. Mild vascular congestion is present centrally. Overall improvement from remote imaging. No acute airspace abnormality. No significant skeletal finding.      No acute finding in the chest.       Scheduled Medicines   Medications:    sodium chloride flush  10 mL Intravenous 2 times per day    enoxaparin  30 mg Subcutaneous BID    dexamethasone  6 mg Oral Daily    insulin lispro  0-12 Units Subcutaneous TID     amLODIPine  10 mg Oral Daily    atorvastatin  10 mg Oral Daily    chlorthalidone  25 mg Oral Daily    ezetimibe  10 mg Oral Daily    fenofibrate  160 mg Oral Daily    folic acid  1 mg Oral Daily    insulin glargine  70 Units Subcutaneous Daily    [START ON 1/10/2021] levothyroxine  75 mcg Oral Daily    mesalamine  800 mg Oral TID    losartan  100 mg Oral Daily    [START ON 1/10/2021] pantoprazole  40 mg Oral QAM AC    aspirin  81 mg Oral Daily    insulin lispro  15 Units Subcutaneous TID WC    insulin lispro  0-12 Units Subcutaneous 2 times per day    insulin glargine  70 Units Subcutaneous Nightly    pioglitazone  30 mg Oral Daily      Infusions:    sodium chloride      dextrose           IMPRESSION    Patient Active Problem List   Diagnosis    COVID-19         Pneumonia        Diabetes mellitus        Hypertension        Hyperlipidemia        Obstructive sleep apnea        Obesity      RECOMMENDATIONS:      1. Reviewed POC blood glucose . Labs and X ray results   2. Reviewed Home and Current Medicines   3. Will Start On meal/ Correction bolus Humalog/ Lantus Insulin regime / OHGD   4. Monitor Blood glucose frequently   5. Modify  the dose of Insulin/ OHGD  as needed        Will follow with you  Again thank you for sharing pt's care with me.      Truly yours,       Sujey Chaney MD

## 2021-01-10 NOTE — PROGRESS NOTES
Message to dr Hernán aPstor[de-identified]   patient was just transferred t room 2011 from .  was tachycardiac up there and is now bradycardiac.  has not had any meds to slow the heart since AM meds were given.  had an issue with blood glucose dropping and has now leveled out in that area.   pt pale, denies any SOB or chest pain

## 2021-01-10 NOTE — PROGRESS NOTES
Patient arrived on unit from 4e. Slid over into bed. Skin assessment per giancarlo and rleach rn. Coccyx purple and patient states is sore. No other areas.   Oriented to staff and room

## 2021-01-10 NOTE — PROGRESS NOTES
ekg REVIEWED, patient has slow afib, had uncontrolled blood glucose and was hypotensive on the floor, start low dose dopamine

## 2021-01-10 NOTE — PROGRESS NOTES
Removed patient from regular nasal cannula at 6L and placed patient on 15LHFNC. Rapid response called on patient due to low blood sugar and decreasing saturations. Upon this RT leaving the room, SPO2 was 93% on 15L.   Will initiate airvo per verbal orders of Dr. Juliette Lieberman

## 2021-01-10 NOTE — PROGRESS NOTES
Progress Note( Dr. Shena Villatoro)  1/10/2021  Subjective:   Admit Date: 1/8/2021  PCP: Auugsto Morgan MD    Admitted For :Hypoxia, shortness of breath, Covid pneumonia    Consulted For: Better control of blood glucose    Interval History: Feels somewhat better on high flow oxygen    Denies any chest pains,   Yes SOB . But better on oxygen  Denies nausea or vomiting. No new bowel or bladder symptoms. Intake/Output Summary (Last 24 hours) at 1/10/2021 1518  Last data filed at 1/10/2021 0947  Gross per 24 hour   Intake 10 ml   Output 200 ml   Net -190 ml       DATA    CBC:   Recent Labs     01/08/21  1715 01/09/21  0245 01/10/21  1045   WBC 3.7*  --  5.4   HGB 11.9* 11.7* 12.0*     --  221    CMP:  Recent Labs     01/08/21 1715 01/09/21  0245 01/10/21  0600    137* 137   K 3.9 4.4 4.1     --  100   CO2 21 24 27   BUN 41*  --  40*   CREATININE 1.4* 1.6* 1.3   CALCIUM 8.9  --  8.8   PROT  --   --  5.8*   LABALBU  --   --  3.4   BILITOT  --   --  0.6   ALKPHOS  --   --  25*   AST  --   --  53*   ALT  --   --  30     Lipids:   Lab Results   Component Value Date    CHOL 115 10/29/2012    HDL 39 10/29/2012    TRIG 80 10/29/2012     Glucose:  Recent Labs     01/09/21  2232 01/10/21  0812 01/10/21  1201   POCGLU 162* 216* 171*     [id number]:No results found for: LABA1C  High Sensitivity TSH:   Lab Results   Component Value Date    TSHHS 2.320 10/29/2012     Free T3: No results found for: FT3  Free T4:  Lab Results   Component Value Date    T4FREE 0.89 10/29/2012       Xr Chest Portable    Result Date: 1/8/2021  EXAMINATION: ONE XRAY VIEW OF THE CHEST 1/8/2021 6:12 pm COMPARISON: 02/17/2014 radiograph HISTORY: ORDERING SYSTEM PROVIDED HISTORY: sob \       No acute finding in the chest.     Ct Chest Pulmonary Embolism W Contrast    Result Date: 1/9/2021  EXAMINATION: CTA OF THE CHEST 1/9/2021 4:02 pm       1. No pulmonary embolism. 2. Pulmonary parenchymal findings typical of mild COVID-19 pneumonia. Note that CT pulmonary findings of COVID-19 show overlap with other disease entities including H1N1 influenza, other viral pneumonia (i.e. adenovirus, CMV), organizing pneumonia, alveolar proteinosis and acute interstitial pneumonitis. 3. Trace bilateral pleural effusion. 4. Emphysema. 5. Calcific atherosclerosis aorta and coronary arteries. 6. Mild mediastinal and right hilar lymph node enlargement is almost certainly reactive. This should be followed with IV contrast-enhanced CT chest in 3 months to ensure stability.        Scheduled Medicines   Medications:    insulin lispro  0-18 Units Subcutaneous TID WC    insulin lispro  0-18 Units Subcutaneous 2 times per day    insulin lispro  20 Units Subcutaneous TID WC    sodium chloride flush  10 mL Intravenous 2 times per day    enoxaparin  30 mg Subcutaneous BID    dexamethasone  6 mg Oral Daily    amLODIPine  10 mg Oral Daily    atorvastatin  10 mg Oral Daily    chlorthalidone  25 mg Oral Daily    ezetimibe  10 mg Oral Daily    fenofibrate  160 mg Oral Daily    folic acid  1 mg Oral Daily    levothyroxine  75 mcg Oral Daily    mesalamine  800 mg Oral TID    losartan  100 mg Oral Daily    pantoprazole  40 mg Oral QAM AC    aspirin  81 mg Oral Daily    insulin glargine  70 Units Subcutaneous Nightly    pioglitazone  30 mg Oral Daily    remdesivir IVPB  100 mg Intravenous Q24H      Infusions:    sodium chloride      dextrose           Objective:   Vitals: BP (!) 105/52   Pulse 54   Temp 98.9 °F (37.2 °C) (Oral)   Resp 17   Ht 6' (1.829 m)   Wt (!) 342 lb (155.1 kg)   SpO2 91%   BMI 46.38 kg/m²   General appearance: alert and cooperative with exam  Neck: no JVD or bruit  Thyroid : Normal lobes   Lungs: Has Vesicular Breath sounds has some wheezing and some rales  Heart:  regular rate and rhythm  Abdomen: soft, non-tender; bowel sounds normal; no masses,  no organomegaly  Musculoskeletal: Normal  Extremities: extremities normal, , no edema  Neurologic:  Awake, alert, oriented to name, place and time. Cranial nerves II-XII are grossly intact. Motor is  intact. Sensory is intact. ,  and gait is normal.    Assessment:     Patient Active Problem List:     COVID-19       Pneumonia        Diabetes mellitus        Hypertension        Hyperlipidemia        Obstructive sleep apnea        Obesity      Plan:     1. Reviewed POC blood glucose . Labs and X ray results   2. Reviewed Current Medicines   3. On meal/ Correction bolus Humalog/ Basal Lantus Insulin regime / and Oral Hypoglycemic drugs   4. Monitor Blood glucose frequently   5. Modified  the dose of Insulin/ other medicines as needed   6. Will follow     .      Mortimer Dupont, MD

## 2021-01-10 NOTE — PLAN OF CARE
Rapid response activated because of hypoxemia and severe hypoglycemia. Awake, not in distress, on O2 via NC      , Sat 91% while on 15 LPM  Neuro: no facial asymmetry, LE no motor or sensory deficit  Chest with crackles, bilaterally    Plan    Given D50 IV bolus. Started on D10 infusion. He is now requiring 15 L by nasal cannula. Will start Vapotherm. For portable chest x-ray. Arterial blood gas. EKG with possible atrial fibrillation/atrial flutter. Will consult cardiology. Correct electrolytes  Asked if he wants family to be made aware, he said no.      Delma Damian MD

## 2021-01-10 NOTE — CARE COORDINATION
Called patient to initiate discharge planning. Patient politely declined at this time stating he is \"burning up\", reports his nurse is aware.

## 2021-01-10 NOTE — PROGRESS NOTES
Hospitalist Progress Note      Name:  Willem Santillan /Age/Sex: 1945  (67 y.o. male)   MRN & CSN:  [id number] & [id number] Admission Date/Time: 2021  4:54 PM   Location:  [address] [place] [address] PCP: Ramon Syed MD         Hospital Day: 3    Assessment and Plan:   Willem Santillan is a 67 y.o.  male  who presents with COVID-19    Acute Hypoxic Respiratory Failure:   · due to COVID 19  · Requiring O2 via NC  · CXR no acute process  · CT chest with pulmonary parenchymal findings typical of mild COVID-19 pneumonia. · ABG  no hypoxemia. · Pulse oximetry continuously.      COVID 19 Pneumonia  · Presented with SOB, fatigue 2020  · CXR as above, CT PE pending  · Inflammatory markers - elevated D dimer  · Started on Steroid  · Remdesivir day 2  · Convalescent plasma  · DVT prophylaxis     Type 2 DM: on Toujeo, Sliding scale. Hypoglycemia protocol. Accucheck. Endocrinology on consult  Coronary artery disease: Status post coronary artery bypass graft. Continue aspirin, Lipitor. Hyperlipidemia: continue statin  Hypertension: Blood pressure controlled. Continue medications. AMINA on CPAP  AAA S/P Endovascular Repair  Hypothyroidism: Will check TSH. continue Synthroid. Diet DIET CARDIAC; Carb Control: 4 carb choices (60 gms)/meal   DVT Prophylaxis [x] Lovenox, []  Heparin, [] SCDs, [] Warfarin  [] NOAC     GI Prophylaxis [x] PPI,  [] H2 Blocker,  [] Carafate,  [] Diet/Tube Feeds   Code Status Full Code   MDM [] Low, [] Moderate,[x]  High     History of Present Illness:     Chief Complaint: [id number]    Seen and examined today. Still with cough. Nonproductive, shortness of breath is better. Denied chest pain. Ten point ROS reviewed negative, unless as noted above    Objective:        Intake/Output Summary (Last 24 hours) at 1/10/2021 1322  Last data filed at 1/10/2021 0947  Gross per 24 hour   Intake 10 ml   Output 200 ml   Net -190 ml      Vitals:   Vitals:    01/10/21 0813   BP: (!) 105/52   Pulse: 54   Resp: 17 Temp: 98.9 °F (37.2 °C)   SpO2: 91%     Physical Exam:   GEN Awake male, sitting upright in bed in no apparent distress. Appears given age. On oxygen via nasal cannula. EYES Pupils are equally round. No scleral erythema, discharge, or conjunctivitis. HENT Mucous membranes are moist. Oral pharynx without exudates, no evidence of thrush. NECK Supple, no apparent thyromegaly or masses. RESP Clear to auscultation, no wheezes, rales or rhonchi. Symmetric chest movement while on room air. CARDIO/VASC S1/S2 auscultated. Regular rate without appreciable murmurs, rubs, or gallops. No JVD or carotid bruits. Peripheral pulses equal bilaterally and palpable. No peripheral edema. GI Abdomen is soft without significant tenderness, masses, or guarding. Bowel sounds are normoactive. Rectal exam deferred. MSK No gross joint deformities. SKIN Normal coloration, warm, dry. NEURO Cranial nerves appear grossly intact, normal speech, no lateralizing weakness. PSYCH Awake, alert, oriented x 4. Affect appropriate.     Medications:   Medications:    sodium chloride flush  10 mL Intravenous 2 times per day    enoxaparin  30 mg Subcutaneous BID    dexamethasone  6 mg Oral Daily    insulin lispro  0-12 Units Subcutaneous TID WC    amLODIPine  10 mg Oral Daily    atorvastatin  10 mg Oral Daily    chlorthalidone  25 mg Oral Daily    ezetimibe  10 mg Oral Daily    fenofibrate  160 mg Oral Daily    folic acid  1 mg Oral Daily    levothyroxine  75 mcg Oral Daily    mesalamine  800 mg Oral TID    losartan  100 mg Oral Daily    pantoprazole  40 mg Oral QAM AC    aspirin  81 mg Oral Daily    insulin lispro  15 Units Subcutaneous TID WC    insulin lispro  0-12 Units Subcutaneous 2 times per day    insulin glargine  70 Units Subcutaneous Nightly    pioglitazone  30 mg Oral Daily    remdesivir IVPB  100 mg Intravenous Q24H      Infusions:    sodium chloride      dextrose       PRN Meds:     sodium chloride flush, 10 mL, PRN      promethazine, 12.5 mg, Q6H PRN    Or      ondansetron, 4 mg, Q6H PRN      polyethylene glycol, 17 g, Daily PRN      acetaminophen, 650 mg, Q6H PRN    Or      acetaminophen, 650 mg, Q6H PRN      guaiFENesin-dextromethorphan, 5 mL, Q4H PRN      sodium chloride, , PRN      glucose, 15 g, PRN      dextrose, 12.5 g, PRN      glucagon (rDNA), 1 mg, PRN      dextrose, 100 mL/hr, PRN      diazePAM, 5 mg, TID PRN      sodium chloride, 30 mL, PRN        Recent Labs     01/08/21  1715 01/09/21  0245 01/10/21  1045   WBC 3.7*  --  5.4   HGB 11.9* 11.7* 12.0*   HCT 35.8* 34.0* 37.2*     --  221      Recent Labs     01/08/21  1715 01/09/21  0245 01/10/21  0600    137* 137   K 3.9 4.4 4.1     --  100   CO2 21 24 27   BUN 41*  --  40*   CREATININE 1.4* 1.6* 1.3     Recent Labs     01/10/21  0600   AST 53*   ALT 30   BILITOT 0.6   ALKPHOS 25*     Recent Labs     01/08/21 1715   INR 1.02     Recent Labs     01/08/21 1715   TROPONINT <0.010        Imaging reviewed      Electronically signed by Luis Manuel Sanchez MD on 1/10/2021 at 1:22 PM

## 2021-01-11 NOTE — PROGRESS NOTES
Hospitalist Progress Note      Name:  Carlo Chanel /Age/Sex: 1945  (67 y.o. male)   MRN & CSN:  [id number] & [id number] Admission Date/Time: 2021  4:54 PM   Location:  -[facility] PCP: Enma Castellano MD         Hospital Day: 4    Assessment and Plan:   Carlo Chanel is a 67 y.o.  male  who presents with COVID-19    Acute Hypoxic Respiratory Failure:   · due to COVID 19  · Requiring O2 via NC on admission, now on Airvo   · CXR no acute process  · CT chest with pulmonary parenchymal findings typical of mild COVID-19 pneumonia. · ABG  no hypoxemia. · Pulse oximetry continuously.      COVID 19 Pneumonia  · Presented with SOB, fatigue 2020  · CT PE as above  · Inflammatory markers - elevated D dimer  · Started on Steroid  · Remdesivir day 3  · Convalescent plasma still not given. Will follow up. · DVT prophylaxis with Lovenox twice daily. AF in Slow RVR  · Has associated hypertension. · Started on dopamine by cardiology. · CHADSVASV 4.  Started on apixaban. · For echocardiogram  · Cardiology following     Type 2 DM: on Toujeo, Sliding scale. Hypoglycemia protocol. Accucheck. · Episode of hypoglycemia yesterday. · Insulin was adjusted. · Blood sugar more controlled. · Endocrinology following. Coronary artery disease: Status post coronary artery bypass graft. Continue aspirin, Lipitor. Hyperlipidemia: continue statin  Hypertension: Blood pressure controlled. Held for now because of hypotension. AMINA on CPAP  AAA S/P Endovascular Repair  Hypothyroidism: Will check TSH. continue Synthroid. Discussed with son Iain Dowd. Diet DIET CARDIAC; Carb Control: 4 carb choices (60 gms)/meal   DVT Prophylaxis [] Lovenox, []  Heparin, [] SCDs, [] Warfarin  [x] NOAC     GI Prophylaxis [x] PPI,  [] H2 Blocker,  [] Carafate,  [] Diet/Tube Feeds   Code Status Full Code   MDM [] Low, [] Moderate,[x]  High     History of Present Illness:     Chief Complaint: [id number]    Seen and examined today. Transferred to the ICU because of new onset atrial fibrillation and hypotension. Shortness of breath is better. Has occasional cough. No fever or chills. Ten point ROS reviewed negative, unless as noted above    Objective: Intake/Output Summary (Last 24 hours) at 1/11/2021 1030  Last data filed at 1/11/2021 0430  Gross per 24 hour   Intake --   Output 1150 ml   Net -1150 ml      Vitals:   Vitals:    01/11/21 0825   BP:    Pulse:    Resp:    Temp:    SpO2: 92%     Physical Exam:   GEN Awake male, sitting upright in bed in no apparent distress. Appears given age. On Airvo  EYES Pupils are equally round. No scleral erythema, discharge, or conjunctivitis. HENT Mucous membranes are moist. Oral pharynx without exudates, no evidence of thrush. NECK Supple, no apparent thyromegaly or masses. RESP occasional crackles. CARDIO/VASC S1/S2 auscultated. Regular rate without appreciable murmurs, rubs, or gallops. No JVD or carotid bruits. Peripheral pulses equal bilaterally and palpable. No peripheral edema. GI Abdomen is soft without significant tenderness, masses, or guarding. Bowel sounds are normoactive. Rectal exam deferred. MSK No gross joint deformities. SKIN Normal coloration, warm, dry. NEURO Cranial nerves appear grossly intact, normal speech, no lateralizing weakness. PSYCH Awake, alert, oriented x 4. Affect appropriate.     Medications:   Medications:    insulin glargine  60 Units Subcutaneous Nightly    cefepime  2 g Intravenous Q12H    vancomycin  1,750 mg Intravenous Q24H    insulin lispro  0-18 Units Subcutaneous TID     insulin lispro  0-18 Units Subcutaneous 2 times per day    insulin lispro  10 Units Subcutaneous TID     sodium chloride flush  10 mL Intravenous 2 times per day    enoxaparin  30 mg Subcutaneous BID    dexamethasone  6 mg Oral Daily    [Held by provider] amLODIPine  10 mg Oral Daily    atorvastatin  10 mg Oral Daily    [Held by provider] chlorthalidone  25 mg Oral Daily    ezetimibe  10 mg Oral Daily    fenofibrate  160 mg Oral Daily    folic acid  1 mg Oral Daily    levothyroxine  75 mcg Oral Daily    mesalamine  800 mg Oral TID    [Held by provider] losartan  100 mg Oral Daily    pantoprazole  40 mg Oral QAM AC    aspirin  81 mg Oral Daily    remdesivir IVPB  100 mg Intravenous Q24H      Infusions:    dextrose      DOPamine 10 mcg/kg/min (01/11/21 0825)    sodium chloride       PRN Meds:     glucose, 15 g, PRN      dextrose, 12.5 g, PRN      glucagon (rDNA), 1 mg, PRN      dextrose, 100 mL/hr, PRN      albuterol sulfate HFA, 2 puff, Q6H PRN      sodium chloride flush, 10 mL, PRN      promethazine, 12.5 mg, Q6H PRN    Or      ondansetron, 4 mg, Q6H PRN      polyethylene glycol, 17 g, Daily PRN      acetaminophen, 650 mg, Q6H PRN    Or      acetaminophen, 650 mg, Q6H PRN      guaiFENesin-dextromethorphan, 5 mL, Q4H PRN      sodium chloride, , PRN      diazePAM, 5 mg, TID PRN      sodium chloride, 30 mL, PRN        Recent Labs     01/08/21  1715 01/09/21  0245 01/10/21  1045 01/11/21  0514   WBC 3.7*  --  5.4 5.6   HGB 11.9* 11.7* 12.0* 11.3*   HCT 35.8* 34.0* 37.2* 34.6*     --  221 228      Recent Labs     01/08/21  1715 01/09/21  0245 01/10/21  0600 01/11/21  0514    137* 137 138   K 3.9 4.4 4.1 4.6     --  100 101   CO2 21 24 27 27   BUN 41*  --  40* 45*   CREATININE 1.4* 1.6* 1.3 1.4*     Recent Labs     01/10/21  0600 01/11/21  0514   AST 53* 51*   ALT 30 30   BILITOT 0.6 0.5   ALKPHOS 25* 25*     Recent Labs     01/08/21 1715   INR 1.02     Recent Labs     01/08/21 1715   TROPONINT <0.010      Imaging reviewed    Electronically signed by Tricia Baptiste MD on 1/11/2021 at 10:30 AM

## 2021-01-11 NOTE — CONSULTS
Subjective:   CHIEF COMPLAINT / HPI:  67year old male with increasing sob and increasing o2 requirement. He is currently on airvo at 70% fio2. He is hypotensive and bradycardic and currently on dopamine drip      Past Medical History:  Past Medical History:   Diagnosis Date    Diabetes mellitus ([address]        Past Surgical History:    No past surgical history on file.     Current Medications:    Current Facility-Administered Medications: insulin glargine (LANTUS) injection vial 60 Units, 60 Units, Subcutaneous, Nightly  cefepime (MAXIPIME) 2 g IVPB minibag, 2 g, Intravenous, Q12H  vancomycin (VANCOCIN) 1,750 mg in dextrose 5 % 500 mL IVPB, 1,750 mg, Intravenous, Q24H  apixaban (ELIQUIS) tablet 5 mg, 5 mg, Oral, BID  insulin lispro (HUMALOG) injection vial 0-18 Units, 0-18 Units, Subcutaneous, TID WC  insulin lispro (HUMALOG) injection vial 0-18 Units, 0-18 Units, Subcutaneous, 2 times per day  glucose (GLUTOSE) 40 % oral gel 15 g, 15 g, Oral, PRN  dextrose 50 % IV solution, 12.5 g, Intravenous, PRN  glucagon (rDNA) injection 1 mg, 1 mg, Intramuscular, PRN  dextrose 5 % solution, 100 mL/hr, Intravenous, PRN  insulin lispro (HUMALOG) injection vial 10 Units, 10 Units, Subcutaneous, TID WC  albuterol sulfate  (90 Base) MCG/ACT inhaler 2 puff, 2 puff, Inhalation, Q6H PRN  DOPamine (INTROPIN) 400 mg in dextrose 5 % 250 mL infusion, 2.5 mcg/kg/min, Intravenous, Continuous  sodium chloride flush 0.9 % injection 10 mL, 10 mL, Intravenous, 2 times per day  sodium chloride flush 0.9 % injection 10 mL, 10 mL, Intravenous, PRN  promethazine (PHENERGAN) tablet 12.5 mg, 12.5 mg, Oral, Q6H PRN **OR** ondansetron (ZOFRAN) injection 4 mg, 4 mg, Intravenous, Q6H PRN  polyethylene glycol (GLYCOLAX) packet 17 g, 17 g, Oral, Daily PRN  acetaminophen (TYLENOL) tablet 650 mg, 650 mg, Oral, Q6H PRN **OR** acetaminophen (TYLENOL) suppository 650 mg, 650 mg, Rectal, Q6H PRN  dexamethasone (DECADRON) tablet 6 mg, 6 mg, Oral, Daily  guaiFENesin-dextromethorphan (ROBITUSSIN DM) 100-10 MG/5ML syrup 5 mL, 5 mL, Oral, Q4H PRN  0.9 % sodium chloride infusion, , Intravenous, PRN  [Held by provider] amLODIPine (NORVASC) tablet 10 mg, 10 mg, Oral, Daily  atorvastatin (LIPITOR) tablet 10 mg, 10 mg, Oral, Daily  [Held by provider] chlorthalidone (HYGROTON) tablet 25 mg, 25 mg, Oral, Daily  diazePAM (VALIUM) tablet 5 mg, 5 mg, Oral, TID PRN  ezetimibe (ZETIA) tablet 10 mg, 10 mg, Oral, Daily  fenofibrate (TRIGLIDE) tablet 160 mg, 160 mg, Oral, Daily  folic acid (FOLVITE) tablet 1 mg, 1 mg, Oral, Daily  levothyroxine (SYNTHROID) tablet 75 mcg, 75 mcg, Oral, Daily  mesalamine (DELZICOL) delayed release capsule 800 mg, 800 mg, Oral, TID  [Held by provider] losartan (COZAAR) tablet 100 mg, 100 mg, Oral, Daily  pantoprazole (PROTONIX) tablet 40 mg, 40 mg, Oral, QAM AC  aspirin chewable tablet 81 mg, 81 mg, Oral, Daily  [COMPLETED] remdesivir 200 mg in sodium chloride 0.9 % 250 mL IVPB, 200 mg, Intravenous, Once **FOLLOWED BY** remdesivir 100 mg in sodium chloride 0.9 % 250 mL IVPB, 100 mg, Intravenous, Q24H  0.9 % sodium chloride bolus, 30 mL, Intravenous, PRN    No Known Allergies    Social History:    Social History     Socioeconomic History    Marital status:      Spouse name: Not on file    Number of children: Not on file    Years of education: Not on file    Highest education level: Not on file   Occupational History    Not on file   Social Needs    Financial resource strain: Not on file    Food insecurity     Worry: Not on file     Inability: Not on file    Transportation needs     Medical: Not on file     Non-medical: Not on file   Tobacco Use    Smoking status: Not on file   Substance and Sexual Activity    Alcohol use: Not on file    Drug use: Not on file    Sexual activity: Not on file   Lifestyle    Physical activity     Days per week: Not on file     Minutes per session: Not on file    Stress: Not on file   Relationships  Social connections     Talks on phone: Not on file     Gets together: Not on file     Attends Baptism service: Not on file     Active member of club or organization: Not on file     Attends meetings of clubs or organizations: Not on file     Relationship status: Not on file    Intimate partner violence     Fear of current or ex partner: Not on file     Emotionally abused: Not on file     Physically abused: Not on file     Forced sexual activity: Not on file   Other Topics Concern    Not on file   Social History Narrative    Not on file       Family History:   No family history on file. Immunization:    There is no immunization history on file for this patient. Objective:   PHYSICAL EXAM:      VITALS:    Vitals:    01/11/21 1000 01/11/21 1030 01/11/21 1033 01/11/21 1102   BP: (!) 137/54  132/72 (!) 161/76   Pulse: 53 50 53 60   Resp: 24 24 25 24   Temp:   100.4 °F (38 °C)    TempSrc:       SpO2: 92% 92%     Weight:       Height:               NECK:  Supple, symmetrical, trachea midline, no adenopathy, thyroid symmetric, not enlarged and no tenderness  CHEST: Chest expansion equal and symmetrical, no intercostal retraction. LUNGS:  no increased work of breathing, has expiratory wheezes both lungs, no crackles. CARDIOVASCULAR: S1 and S2, no edema and no JVD  ABDOMEN:  normal bowel sounds, non-distended and no masses palpated, and no tenderness to palpation. No hepatospleenomegaly  LYMPHADENOPATHY:  no axillary or supraclavicular adenopathy. No cervical adnenopathy    MUSCULOSKELETAL: No obvious misalignment or effusion of the joints. No clubbing, cyanosis of the digits. RIGHT AND LEFT LOWER EXTREMITIES: No edema, no inflammation, no tenderness. SKIN:  normal skin color, texture, turgor and no redness, warmth, or swelling.  No palpable nodules    DATA:                   EXAMINATION:   CTA OF THE CHEST 1/9/2021 4:02 pm       TECHNIQUE:   CTA of the chest was performed after the administration of intravenous   contrast.  Multiplanar reformatted images are provided for review.  MIP   images are provided for review. Dose modulation, iterative reconstruction,   and/or weight based adjustment of the mA/kV was utilized to reduce the   radiation dose to as low as reasonably achievable.       COMPARISON:   Chest radiograph 01/08/2021       HISTORY:   ORDERING SYSTEM PROVIDED HISTORY: Hypoxemia, elevated D dimer, rule out PE   TECHNOLOGIST PROVIDED HISTORY:   Reason for exam:-> hypoxemia, elevated D dimer, rule out PE   Reason for Exam: SOB, HYPOXEMIA, ELEVATED D-DIMER, COVID +       FINDINGS:   Pulmonary Arteries: Pulmonary arteries are adequately opacified for   evaluation.  No evidence of intraluminal filling defect to suggest pulmonary   embolism.  Main pulmonary artery is normal in caliber, 29 mm.       Mediastinum: Calcific atherosclerosis status post CABG.  The heart is mildly   enlarged.  There is no acute abnormality of the thoracic aorta.  The   ascending thoracic aorta is 38 mm and descending thoracic aorta 30 mm. Pre-vascular lymph node image 40 is 12 mm, right hilar lymph node image 49 is   15 mm and another on image 51 is 17 mm, 2 additional further inferior on the   right image 57 each measure 11 mm.  Right-sided posterior mediastinal lymph   node is 11 mm on image 62.  No left hilar lymph node enlargement is seen.       Lungs/pleura: No pneumothorax.  Predominantly biapical sequela from   emphysema.  Trace pleural effusion bilateral.  Patchy pulmonary infiltrates   are scattered throughout the bilateral lungs accounting for less than 25% of   the pulmonary parenchymal volume.       Upper Abdomen: Limited images of the upper abdomen are unremarkable.       Soft Tissues/Bones: No acute bone or soft tissue abnormality.           Impression   1. No pulmonary embolism.    2. Pulmonary parenchymal findings typical of mild COVID-19 pneumonia.  Note   that CT pulmonary findings of COVID-19 show overlap with other disease   entities including H1N1 influenza, other viral pneumonia (i.e. adenovirus,   CMV), organizing pneumonia, alveolar proteinosis and acute interstitial   pneumonitis. 3. Trace bilateral pleural effusion. 4. Emphysema. 5. Calcific atherosclerosis aorta and coronary arteries. 6. Mild mediastinal and right hilar lymph node enlargement is almost   certainly reactive.  This should be followed with IV contrast-enhanced CT   chest in 3 months to ensure stability.           Order History    Open Order Details           Assessment:     1. Ac hypoxic resp failure  2. covid pneumonuia  3. abraham bact superadded pneumonia  4. Bradycardia/hypotension          Plan:     1. Adequate o2 adm  2. Cont airvo  3. bipap if needed  4. Bd rx  5. Decadron,remdesivir and convalescent plasma  6. On cefepime and vanc and will continue  7.  Thanks will follow

## 2021-01-11 NOTE — CARE COORDINATION
Patient admitted to COVID unit and on Vapotherm. Per medical record review patient is from home and  is independent . Patient has PCP and insurance to assist with RX coverage. Case Management to follow to assist with any potential discharge needs.

## 2021-01-11 NOTE — PROGRESS NOTES
01/11/21 0229   Oxygen Therapy/Pulse Ox   O2 Therapy Oxygen humidified   O2 Device Heated high flow cannula   O2 Flow Rate (L/min) 30 L/min   FiO2  75 %   Resp 20   SpO2 91 %

## 2021-01-11 NOTE — PROGRESS NOTES
Today's plan:  Continue dopamine, start anticoagulation,echo      Admit Date:  1/8/2021    Subjective: lethargic      Chief complaints on admission  Chief Complaint   Patient presents with    Shortness of Breath         History of present illness:Carroll is a 67 y. o.year old who  presents with had concerns including Shortness of Breath. Past medical history:    has a past medical history of Diabetes mellitus ([address] Past surgical history: none  Social History: no smoking     Family history: none    No Known Allergies      Objective:   BP (!) 161/76   Pulse 60   Temp 100.4 °F (38 °C)   Resp 24   Ht 6' (1.829 m)   Wt (!) 341 lb 14.9 oz (155.1 kg)   SpO2 92%   BMI 46.37 kg/m²       Intake/Output Summary (Last 24 hours) at 1/11/2021 1218  Last data filed at 1/11/2021 0430  Gross per 24 hour   Intake --   Output 1150 ml   Net -1150 ml       TELEMETRY:afib     Physical Exam:  Constitutional:  Well developed, Well nourished, No acute distress, Non-toxic appearance. HENT:  Normocephalic, Atraumatic, Bilateral external ears normal, Oropharynx moist, No oral exudates, Nose normal. Neck- Normal range of motion, No tenderness, Supple, No stridor. Eyes:  PERRL, EOMI, Conjunctiva normal, No discharge. Respiratory:  Normal breath sounds, No respiratory distress, No wheezing, No chest tenderness. ,no use of accessory muscles, diaphragm movement is normal  Cardiovascular: (PMI) apex non displaced,no lifts no thrills, no s3,no s4, Normal heart rate, Normal rhythm, No murmurs, No rubs, No gallops. Carotid arteries pulse and amplitude are normal no bruit, no abdominal bruit noted ( normal abdominal aorta ausculation), femoral arteries pulse and amplitude are normal no bruit, pedal pulses are normal  GI:  Bowel sounds normal, Soft, No tenderness, No masses, No pulsatile masses. : External genitalia appear normal, No masses or lesions. No discharge. No CVA tenderness.    Musculoskeletal:  Intact distal pulses, No edema, No tenderness, No cyanosis, No clubbing. Good range of motion in all major joints. No tenderness to palpation or major deformities noted. Back- No tenderness. Integument:  Warm, Dry, No erythema, No rash. Lymphatic:  No lymphadenopathy noted. Neurologic:  Alert & oriented x 3, Normal motor function, Normal sensory function, No focal deficits noted.    Psychiatric:  Affect normal, Judgment normal, Mood normal.     Medications:    insulin glargine  60 Units Subcutaneous Nightly    cefepime  2 g Intravenous Q12H    vancomycin  1,750 mg Intravenous Q24H    apixaban  5 mg Oral BID    insulin lispro  0-18 Units Subcutaneous TID WC    insulin lispro  0-18 Units Subcutaneous 2 times per day    insulin lispro  10 Units Subcutaneous TID WC    sodium chloride flush  10 mL Intravenous 2 times per day    dexamethasone  6 mg Oral Daily    [Held by provider] amLODIPine  10 mg Oral Daily    atorvastatin  10 mg Oral Daily    [Held by provider] chlorthalidone  25 mg Oral Daily    ezetimibe  10 mg Oral Daily    fenofibrate  160 mg Oral Daily    folic acid  1 mg Oral Daily    levothyroxine  75 mcg Oral Daily    mesalamine  800 mg Oral TID    [Held by provider] losartan  100 mg Oral Daily    pantoprazole  40 mg Oral QAM AC    aspirin  81 mg Oral Daily    remdesivir IVPB  100 mg Intravenous Q24H      dextrose      DOPamine 14 mcg/kg/min (01/11/21 1216)    sodium chloride       glucose, dextrose, glucagon (rDNA), dextrose, albuterol sulfate HFA, sodium chloride flush, promethazine **OR** ondansetron, polyethylene glycol, acetaminophen **OR** acetaminophen, guaiFENesin-dextromethorphan, sodium chloride, diazePAM, sodium chloride    Lab Data:  CBC:   Recent Labs     01/08/21  1715 01/09/21  0245 01/10/21  1045 01/11/21  0514   WBC 3.7*  --  5.4 5.6   HGB 11.9* 11.7* 12.0* 11.3*   HCT 35.8* 34.0* 37.2* 34.6*   MCV 93.5  --  96.4 95.3     --  221 228     BMP:   Recent Labs     01/08/21  1715 01/09/21  0245 01/10/21  0600 01/11/21  0514    137* 137 138   K 3.9 4.4 4.1 4.6     --  100 101   CO2 21 24 27 27   BUN 41*  --  40* 45*   CREATININE 1.4* 1.6* 1.3 1.4*     LIVER PROFILE:   Recent Labs     01/10/21  0600 01/11/21  0514   AST 53* 51*   ALT 30 30   BILITOT 0.6 0.5   ALKPHOS 25* 25*     PT/INR:   Recent Labs     01/08/21  1715   PROTIME 13.3   INR 1.02     APTT: No results for input(s): APTT in the last 72 hours. BNP:  No results for input(s): BNP in the last 72 hours. TROPONIN: @TROPONINI:3@      Assessment:  67 y. o.year old who is admitted for          Plan:  1. Bradycardia with afib: not a junctional escape, will star dopamine drip for now,echo ordered, ok to use anticoagulation  2. Hypotension and shock: continue dopamine drip  3. COVID 19: contineu treatment as per protocol  4. Pneumonia: as per protocol  DM: had hypoglycemia, hold insulin and watch for hypoglycemiaHealth maintenance: exerise and diet  All labs, medications and tests reviewed, continue all other medications of all above medical condition listed as is.       Robert Montes De Oca MD 1/11/2021 12:18 PM

## 2021-01-11 NOTE — CONSULTS
PICC Consult complete. Education regarding insertion of PICC reviewed with patient. Risk/benefit/and alternatives discussed. verbalized understanding. Consent given by patient. Time out done at . PICC line inserted right upper arm  without difficulty per protocol. Pt tolerated well. Good blood return and flushes easily. Patient in Afib, unable to 3CG for tip confirmation. STAT portable chest Xray ordered. Awaiting chest Xray to be read. Educational booklet left at bedside.       Santiago White

## 2021-01-11 NOTE — CONSULTS
CARDIOLOGY CONSULT NOTE   Reason for consultation: bradycardia,afib  Referring physician:  Eduardo Ga MD     Primary care physician: Terrie Mccall MD      Dear Dr. Manish Varghese  Thanks for the consult. History of present illness:Carroll is a 67 y. o.year old who  presents with for shortness of breath which is mild, for many years, intermittent, self limiting, not associated with cough or fever, gets worse with activity and better with rest,he had uncontolled blood glucose, hypotensive and had bradycardia, patient is transferred to ICU step down and cardiology consult is called for possible junctional rhythm, howver, it was slow afib    Chief Complaint   Patient presents with    Shortness of Breath     Blood pressure, cholesterol, blood glucose and weight are well controlled. Past medical history:    has a past medical history of Diabetes mellitus ([address]   Past surgical history: none  Social History:     Family history:   no family history of CAD, STROKE of DM    No Known Allergies        insulin glargine (LANTUS) injection vial 60 Units, Nightly      cefepime (MAXIPIME) 2 g IVPB minibag, Q12H      vancomycin (VANCOCIN) 1,750 mg in dextrose 5 % 500 mL IVPB, Q24H      apixaban (ELIQUIS) tablet 5 mg, BID      insulin lispro (HUMALOG) injection vial 0-18 Units, TID WC      insulin lispro (HUMALOG) injection vial 0-18 Units, 2 times per day      glucose (GLUTOSE) 40 % oral gel 15 g, PRN      dextrose 50 % IV solution, PRN      glucagon (rDNA) injection 1 mg, PRN      dextrose 5 % solution, PRN      insulin lispro (HUMALOG) injection vial 10 Units, TID WC      albuterol sulfate  (90 Base) MCG/ACT inhaler 2 puff, Q6H PRN      DOPamine (INTROPIN) 400 mg in dextrose 5 % 250 mL infusion, Continuous      sodium chloride flush 0.9 % injection 10 mL, 2 times per day      sodium chloride flush 0.9 % injection 10 mL, PRN      promethazine (PHENERGAN) tablet 12.5 mg, Q6H PRN    Or      ondansetron (ZOFRAN) injection 4 mg, Q6H PRN      polyethylene glycol (GLYCOLAX) packet 17 g, Daily PRN      acetaminophen (TYLENOL) tablet 650 mg, Q6H PRN    Or      acetaminophen (TYLENOL) suppository 650 mg, Q6H PRN      dexamethasone (DECADRON) tablet 6 mg, Daily      guaiFENesin-dextromethorphan (ROBITUSSIN DM) 100-10 MG/5ML syrup 5 mL, Q4H PRN      0.9 % sodium chloride infusion, PRN      [Held by provider] amLODIPine (NORVASC) tablet 10 mg, Daily      atorvastatin (LIPITOR) tablet 10 mg, Daily      [Held by provider] chlorthalidone (HYGROTON) tablet 25 mg, Daily      diazePAM (VALIUM) tablet 5 mg, TID PRN      ezetimibe (ZETIA) tablet 10 mg, Daily      fenofibrate (TRIGLIDE) tablet 160 mg, Daily      folic acid (FOLVITE) tablet 1 mg, Daily      levothyroxine (SYNTHROID) tablet 75 mcg, Daily      mesalamine (DELZICOL) delayed release capsule 800 mg, TID      [Held by provider] losartan (COZAAR) tablet 100 mg, Daily      pantoprazole (PROTONIX) tablet 40 mg, QAM AC      aspirin chewable tablet 81 mg, Daily      remdesivir 100 mg in sodium chloride 0.9 % 250 mL IVPB, Q24H      0.9 % sodium chloride bolus, PRN      Current Facility-Administered Medications   Medication Dose Route Frequency Provider Last Rate Last Admin    insulin glargine (LANTUS) injection vial 60 Units  60 Units Subcutaneous Nightly M Yuko Burger MD        cefepime (MAXIPIME) 2 g IVPB minibag  2 g Intravenous Q12H Abena Price  mL/hr at 01/11/21 1038 2 g at 01/11/21 1038    vancomycin (VANCOCIN) 1,750 mg in dextrose 5 % 500 mL IVPB  1,750 mg Intravenous Q24H Abena Price MD        apixaban Kashifyariel Abdallaan) tablet 5 mg  5 mg Oral BID Abena Price MD        insulin lispro (HUMALOG) injection vial 0-18 Units  0-18 Units Subcutaneous TID  Samara Suárez MD   6 Units at 01/11/21 1038    insulin lispro (HUMALOG) injection vial 0-18 Units  0-18 Units Subcutaneous 2 times per day Samara Suárez MD   9 Units at 01/10/21 3595  glucose (GLUTOSE) 40 % oral gel 15 g  15 g Oral PRN Thai Kim MD        dextrose 50 % IV solution  12.5 g Intravenous PRN Thai Kim MD        glucagon (rDNA) injection 1 mg  1 mg Intramuscular PRN Thai Kim MD        dextrose 5 % solution  100 mL/hr Intravenous PRN Thai Kim MD        insulin lispro (HUMALOG) injection vial 10 Units  10 Units Subcutaneous TID  Ariana Godinez MD        albuterol sulfate  (90 Base) MCG/ACT inhaler 2 puff  2 puff Inhalation Q6H PRN Thai Kim MD   2 puff at 01/11/21 0225    DOPamine (INTROPIN) 400 mg in dextrose 5 % 250 mL infusion  2.5 mcg/kg/min Intravenous Continuous Sydney Coyle MD 87.2 mL/hr at 01/11/21 1134 15 mcg/kg/min at 01/11/21 1134    sodium chloride flush 0.9 % injection 10 mL  10 mL Intravenous 2 times per day Thai Kim MD   10 mL at 01/11/21 1040    sodium chloride flush 0.9 % injection 10 mL  10 mL Intravenous PRN Thai Kim MD        promethazine (PHENERGAN) tablet 12.5 mg  12.5 mg Oral Q6H PRN Thai Kim MD        Or    ondansetron [facility]) injection 4 mg  4 mg Intravenous Q6H PRN Thai Kim MD   4 mg at 01/11/21 1100    polyethylene glycol (GLYCOLAX) packet 17 g  17 g Oral Daily PRN Thai Kim MD        acetaminophen (TYLENOL) tablet 650 mg  650 mg Oral Q6H PRN Thai Kim MD   650 mg at 01/11/21 1038    Or    acetaminophen (TYLENOL) suppository 650 mg  650 mg Rectal Q6H PRN Thai Kim MD        dexamethasone (DECADRON) tablet 6 mg  6 mg Oral Daily Thai Kim MD   6 mg at 01/11/21 1039    guaiFENesin-dextromethorphan (ROBITUSSIN DM) 100-10 MG/5ML syrup 5 mL  5 mL Oral Q4H PRN Thai Kim MD        0.9 % sodium chloride infusion   Intravenous PRN Thai Kim MD        [facility] by provider] amLODIPine (NORVASC) tablet 10 mg  10 mg Oral Daily Thai Kim MD   10 mg at 01/10/21 0942    atorvastatin (LIPITOR) tablet 10 mg anxiety or depression  · Endocrine: No malaise, fatigue or temperature intolerance  · Hematologic/Lymphatic: No bleeding problems, blood clots or swollen lymph nodes  · Allergic/Immunologic: No nasal congestion or hives  All systems negative except as marked. ·   ·      Physical Examination:    Vitals:    01/11/21 1102   BP: 93/50   Pulse: 48   Resp: 24   Temp: afebrile   SpO2:       Wt Readings from Last 3 Encounters:   01/10/21 (!) 341 lb 14.9 oz (155.1 kg)     Body mass index is 46.37 kg/m². General Appearance: LETHARGIC    Constitutional:  Well developed, Well nourished, No acute distress, Non-toxic appearance. HENT:  Normocephalic, Atraumatic, Bilateral external ears normal, Oropharynx moist, No oral exudates, Nose normal. Neck- Normal range of motion, No tenderness, Supple, No stridor,no apical-carotid delay, no carotid bruit  Eyes:  PERRL, EOMI, Conjunctiva normal, No discharge. Respiratory:  Normal breath sounds, No respiratory distress, No wheezing, No chest tenderness. ,no use of accessory muscles, diaphragm movement is normal  Cardiovascular: (PMI) apex non displaced,no lifts no thrills, no s3,no s4, Normal heart rate, Normal rhythm, No murmurs, No rubs, No gallops. Carotid arteries pulse and amplitude are normal no bruit, no abdominal bruit noted ( normal abdominal aorta ausculation), femoral arteries pulse and amplitude are normal no bruit, pedal pulses are normal  GI:  Bowel sounds normal, Soft, No tenderness, No masses, No pulsatile masses, no hepatosplenomegally, no bruits  : External genitalia appear normal, No masses or lesions. No discharge. No CVA tenderness. Musculoskeletal:  Intact distal pulses, No edema, No tenderness, No cyanosis, No clubbing. Good range of motion in all major joints. No tenderness to palpation or major deformities noted. Back- No tenderness. Integument:  Warm, Dry, No erythema, No rash. Skin: no rash, no ulcers  Lymphatic:  No lymphadenopathy noted. Neurologic:  LETHARGIC  Psychiatric:  LETHARGIC  Lab Review   Recent Labs     01/11/21 0514   WBC 5.6   HGB 11.3*   HCT 34.6*         Recent Labs     01/11/21 0514      K 4.6      CO2 27   BUN 45*   CREATININE 1.4*     Recent Labs     01/11/21 0514   AST 51*   ALT 30   BILITOT 0.5   ALKPHOS 25*     No results for input(s): TROPONINI in the last 72 hours. No results found for: BNP  Lab Results   Component Value Date    INR 1.02 01/08/2021    PROTIME 13.3 01/08/2021         EKG:afib    Chest Xray:Bilateral peripheral airspace opacities suggesting COVID pneumonia    ECHO:pending  Labs, echo, meds reviewed  Assessment: 67 y. o.year old with PMH of  has a past medical history of Diabetes mellitus ([facility] [address] Recommendations:transfer patient to ICU/STEP DOWN    1. Bradycardia with afib: not a junctional escape, will star dopamine drip for now,echo ordered, ok to use anticoagulation  2. Hypotension and shock: start dopamine drip  3. COVID 19: contineu treatment as per protocol  4. Pneumonia: as per protocol  5. DM: had hypoglycemia, hold insulin and watch for hypoglycemia  6. Health maintenance: exerise and diet  All labs, medications and tests reviewed, continue all other medications of all above medical condition listed as is.          Tammy Sánchez MD, 1/11/2021 12:07 PM

## 2021-01-11 NOTE — PROGRESS NOTES
[facility]  consulted by Dr. Bret Reyes for monitoring and adjustment. Indication for treatment: Pneumonia  Goal trough: 15-20 mcg/mL    Pertinent Laboratory Values:   Temp Readings from Last 3 Encounters:   01/10/21 98.3 °F (36.8 °C) (Oral)     Recent Labs     01/08/21  1715 01/10/21  1045 01/11/21  0514   WBC 3.7* 5.4 5.6   LACTATE 2.7*  --   --      Recent Labs     01/08/21  1715 01/09/21  0245 01/10/21  0600 01/11/21  0514   BUN 41*  --  40* 45*   CREATININE 1.4* 1.6* 1.3 1.4*     Estimated Creatinine Clearance: 69 mL/min (A) (based on SCr of 1.4 mg/dL (H)). Intake/Output Summary (Last 24 hours) at 1/11/2021 0856  Last data filed at 1/11/2021 0430  Gross per 24 hour   Intake 10 ml   Output 1150 ml   Net -1140 ml       Pertinent Cultures:  Date    Source    Results  1/8/21   Covid-19   Positive  1/11/21  MRSA nasal   Ordered    Vancomycin level:   TROUGH:  No results for input(s): VANCOTROUGH in the last 72 hours. RANDOM:  No results for input(s): VANCORANDOM in the last 72 hours. Assessment:  · WBC and temperature: WNL  · SCr, BUN, and urine output: Stable  · Day(s) of therapy: #1  · Vancomycin concentration: to be collected    Plan:  · Vancomycin 1750 mg IVPB q24h  · Predicted AUC/LOGAN: 528  · Predicted trough: ~15  · Plan to check a trough level prior to 4th total dose  · Pharmacy will continue to monitor patient and adjust therapy as indicated    VANCOMYCIN CONCENTRATION SCHEDULED FOR 1/14 @ 6289    Thank you for the consult.   Yo Oh RPh   1/11/2021 8:56 AM

## 2021-01-12 NOTE — PROGRESS NOTES
Today's plan:  Continue dopamine, continue anticoagulation,echo  LVEF 55%      Admit Date:  1/8/2021    Subjective: lethargic      Chief complaints on admission  Chief Complaint   Patient presents with    Shortness of Breath         History of present illness:Carroll is a 67 y. o.year old who  presents with had concerns including Shortness of Breath. Past medical history:    has a past medical history of Diabetes mellitus ([address] Past surgical history: none  Social History: no smoking     Family history: none    No Known Allergies      Objective:   BP (!) 152/67   Pulse 62   Temp 99.1 °F (37.3 °C) (Axillary)   Resp 22   Ht 6' (1.829 m)   Wt (!) 341 lb 14.9 oz (155.1 kg)   SpO2 91%   BMI 46.37 kg/m²       Intake/Output Summary (Last 24 hours) at 1/12/2021 1402  Last data filed at 1/12/2021 1136  Gross per 24 hour   Intake 1605 ml   Output 2200 ml   Net -595 ml       TELEMETRY:afib     Physical Exam:  Constitutional:  Well developed, Well nourished, No acute distress, Non-toxic appearance. HENT:  Normocephalic, Atraumatic, Bilateral external ears normal, Oropharynx moist, No oral exudates, Nose normal. Neck- Normal range of motion, No tenderness, Supple, No stridor. Eyes:  PERRL, EOMI, Conjunctiva normal, No discharge. Respiratory:  Normal breath sounds, No respiratory distress, No wheezing, No chest tenderness. ,no use of accessory muscles, diaphragm movement is normal  Cardiovascular: (PMI) apex non displaced,no lifts no thrills, no s3,no s4, Normal heart rate, Normal rhythm, No murmurs, No rubs, No gallops. Carotid arteries pulse and amplitude are normal no bruit, no abdominal bruit noted ( normal abdominal aorta ausculation), femoral arteries pulse and amplitude are normal no bruit, pedal pulses are normal  GI:  Bowel sounds normal, Soft, No tenderness, No masses, No pulsatile masses. : External genitalia appear normal, No masses or lesions. No discharge. No CVA tenderness.    Musculoskeletal: Intact distal pulses, No edema, No tenderness, No cyanosis, No clubbing. Good range of motion in all major joints. No tenderness to palpation or major deformities noted. Back- No tenderness. Integument:  Warm, Dry, No erythema, No rash. Lymphatic:  No lymphadenopathy noted. Neurologic:  Alert & oriented x 3, Normal motor function, Normal sensory function, No focal deficits noted.    Psychiatric:  Affect normal, Judgment normal, Mood normal.     Medications:    insulin glargine  70 Units Subcutaneous Nightly    insulin lispro  0-24 Units Subcutaneous 2 times per day    insulin lispro  0-24 Units Subcutaneous TID WC    miconazole   Topical BID    cefepime  2 g Intravenous Q12H    vancomycin  1,750 mg Intravenous Q24H    apixaban  5 mg Oral BID    sodium chloride flush  10 mL Intravenous 2 times per day    insulin NPH  30 Units Subcutaneous QAM    insulin lispro  10 Units Subcutaneous TID WC    sodium chloride flush  10 mL Intravenous 2 times per day    dexamethasone  6 mg Oral Daily    [Held by provider] amLODIPine  10 mg Oral Daily    atorvastatin  10 mg Oral Daily    [Held by provider] chlorthalidone  25 mg Oral Daily    ezetimibe  10 mg Oral Daily    fenofibrate  160 mg Oral Daily    folic acid  1 mg Oral Daily    levothyroxine  75 mcg Oral Daily    mesalamine  800 mg Oral TID    [Held by provider] losartan  100 mg Oral Daily    pantoprazole  40 mg Oral QAM AC    aspirin  81 mg Oral Daily    remdesivir IVPB  100 mg Intravenous Q24H      dextrose      DOPamine 11 mcg/kg/min (01/12/21 1239)    sodium chloride       sodium chloride flush, glucose, dextrose, glucagon (rDNA), dextrose, albuterol sulfate HFA, sodium chloride flush, promethazine **OR** ondansetron, polyethylene glycol, acetaminophen **OR** acetaminophen, guaiFENesin-dextromethorphan, sodium chloride, diazePAM, sodium chloride    Lab Data:  CBC:   Recent Labs     01/10/21  1045 01/11/21  0514 01/12/21  0432   WBC 5.4 5.6 6.1

## 2021-01-12 NOTE — PROGRESS NOTES
Pt worsening respiratory status. Abdominal breathing, 89% O2 sat Airvo 90% 30L. Lung sounds are congested with wheezing noted. Pt received two units of convalescent plasma yesterday. Notified Dr. Ana Moody and Dr. Skyler Blackman. Stat ABG, CXR placed per Dr. Skyler Blackman. 40 IVP Lasix given and zamora inserted per Dr. Ana Moody. 1130: Pt with slight improvement after lasix. Sent ABG results to Dr. Skyler Blackman. No new orders at this time.

## 2021-01-12 NOTE — PROGRESS NOTES
[facility]  consulted by Dr. Ambar Ibarra for monitoring and adjustment. Indication for treatment: Pneumonia  Goal trough: 15-20 mcg/mL    Pertinent Laboratory Values:   Temp Readings from Last 3 Encounters:   01/12/21 99.1 °F (37.3 °C) (Axillary)     Recent Labs     01/10/21  1045 01/11/21  0514 01/12/21  0432   WBC 5.4 5.6 6.1     Recent Labs     01/10/21  0600 01/11/21  0514 01/12/21  0432   BUN 40* 45* 40*   CREATININE 1.3 1.4* 1.2     Estimated Creatinine Clearance: 80 mL/min (based on SCr of 1.2 mg/dL). Intake/Output Summary (Last 24 hours) at 1/12/2021 1434  Last data filed at 1/12/2021 1136  Gross per 24 hour   Intake 1605 ml   Output 2200 ml   Net -595 ml       Pertinent Cultures:  Date    Source    Results  1/8/21   Covid-19   Positive  1/8/21   Blood    NGTD  1/11/21  MRSA nasal   Ordered    Vancomycin level:   TROUGH:  No results for input(s): VANCOTROUGH in the last 72 hours. RANDOM:  No results for input(s): VANCORANDOM in the last 72 hours. Assessment:  · WBC and temperature: WNL  · SCr, BUN, and urine output: Stable  · Day(s) of therapy: #2  · Vancomycin concentration: to be collected    Plan:  · Vancomycin 1750 mg IVPB q24h  · Predicted AUC/LOGAN: 528  · Predicted trough: ~15  · Plan to check a trough level prior to 4th total dose  · Pharmacy will continue to monitor patient and adjust therapy as indicated    VANCOMYCIN CONCENTRATION SCHEDULED FOR 1/14 @ 4810    Thank you for the consult.   Kendy Alvarenga RPh   1/12/2021 2:34 PM

## 2021-01-12 NOTE — PROGRESS NOTES
Pt refusing insulin this AM. . Educated pt on importance of insulin regimen. Verbalized understanding but states he does not want his BG to drop again.  Will notify hospitalist.

## 2021-01-12 NOTE — PROGRESS NOTES
pulmonary      SUBJECTIVE: remains on high fio2 via airvo     OBJECTIVE    VITALS:  BP (!) 142/81   Pulse 73   Temp 98.5 °F (36.9 °C) (Oral)   Resp 29   Ht 6' (1.829 m)   Wt (!) 341 lb 14.9 oz (155.1 kg)   SpO2 (!) 88%   BMI 46.37 kg/m²   HEAD AND FACE EXAM:  No throat injection, no active exudate,no thrush  NECK EXAM;No JVD, no masses, symmetrical  CHEST EXAM; Expansion equal and symmetrical, no masses  LUNG EXAM; Good breath sounds bilaterally. There are expiratory wheezes both lungs, there are crackles at both lung bases  CARDIOVASCULAR EXAM: Positive S1 and S2, no S3 or S4, no clicks ,no murmurs  RIGHT AND LEFT LOWER EXTRIMITY EXAM: No edema, no swelling, no inflamation            LABS   Lab Results   Component Value Date    WBC 6.1 01/12/2021    HGB 12.3 (L) 01/12/2021    HCT 37.0 (L) 01/12/2021    MCV 94.6 01/12/2021     01/12/2021     Lab Results   Component Value Date    CREATININE 1.2 01/12/2021    BUN 40 (H) 01/12/2021     01/12/2021    K 4.5 01/12/2021    CL 99 01/12/2021    CO2 27 01/12/2021     Lab Results   Component Value Date    INR 1.02 01/08/2021    PROTIME 13.3 01/08/2021        No results found for: PHOS     Recent Labs     01/09/21  1530 01/10/21  1600   PH 7.43 7.47*   PO2ART 82 73*   [id number] 38.0 40.0   O2SAT 94.6* 93.7*         Wt Readings from Last 3 Encounters:   01/10/21 (!) 341 lb 14.9 oz (155.1 kg)               ASSESMENT  Ac resp failure  covid pneumonia  abraham pneumonia        PLAN  1. Cont o2 and airvo  2. vanc plus cefepime  3.  Decadron and remdesivir and plasma    1/12/2021  Junior Storm M.D.

## 2021-01-12 NOTE — PROGRESS NOTES
Progress Note( Dr. Jacklyn Liu)  1/11/2021  Subjective:   Admit Date: 1/8/2021  PCP: Corina Fernandez MD    Admitted For :Hypoxia, shortness of breath, Covid pneumonia    Consulted For: Better control of blood glucose    Interval History: Patient is respiratory status got worse last night. O2 saturation dropped to mid 70s. He was moved to [address] [place] [address] intensive care unit this time he was placed on high flow oxygen. Feels more comfortable this evening  Denies any chest pains,   Yes SOB . Denies nausea or vomiting. No new bowel or bladder symptoms.        Intake/Output Summary (Last 24 hours) at 1/11/2021 2314  Last data filed at 1/11/2021 2159  Gross per 24 hour   Intake 1605 ml   Output 2400 ml   Net -795 ml       DATA    CBC:   Recent Labs     01/09/21  0245 01/10/21  1045 01/11/21  0514   WBC  --  5.4 5.6   HGB 11.7* 12.0* 11.3*   PLT  --  221 228    CMP:  Recent Labs     01/09/21  0245 01/10/21  0600 01/11/21  0514   * 137 138   K 4.4 4.1 4.6   CL  --  100 101   CO2 24 27 27   BUN  --  40* 45*   CREATININE 1.6* 1.3 1.4*   CALCIUM  --  8.8 8.1*   PROT  --  5.8* 5.5*   LABALBU  --  3.4 3.2*   BILITOT  --  0.6 0.5   ALKPHOS  --  25* 25*   AST  --  53* 51*   ALT  --  30 30     Lipids:   Lab Results   Component Value Date    CHOL 115 10/29/2012    HDL 39 10/29/2012    TRIG 80 10/29/2012     Glucose:  Recent Labs     01/11/21  1140 01/11/21  1702 01/11/21  2034   POCGLU 256* 473* 403*     [id number]:No results found for: LABA1C  High Sensitivity TSH:   Lab Results   Component Value Date    TSHHS 2.320 10/29/2012     Free T3: No results found for: FT3  Free T4:  Lab Results   Component Value Date    T4FREE 0.89 10/29/2012       Xr Chest Portable    Result Date: 1/8/2021  EXAMINATION: ONE XRAY VIEW OF THE CHEST 1/8/2021 6:12 pm COMPARISON: 02/17/2014 radiograph HISTORY: ORDERING SYSTEM PROVIDED HISTORY: sob \       No acute finding in the chest.     Ct Chest Pulmonary Embolism W Contrast    Result Date: 1/9/2021  EXAMINATION: CTA OF THE CHEST 1/9/2021 4:02 pm       1. No pulmonary embolism. 2. Pulmonary parenchymal findings typical of mild COVID-19 pneumonia. Note that CT pulmonary findings of COVID-19 show overlap with other disease entities including H1N1 influenza, other viral pneumonia (i.e. adenovirus, CMV), organizing pneumonia, alveolar proteinosis and acute interstitial pneumonitis. 3. Trace bilateral pleural effusion. 4. Emphysema. 5. Calcific atherosclerosis aorta and coronary arteries. 6. Mild mediastinal and right hilar lymph node enlargement is almost certainly reactive. This should be followed with IV contrast-enhanced CT chest in 3 months to ensure stability.        Scheduled Medicines   Medications:    insulin glargine  60 Units Subcutaneous Nightly    cefepime  2 g Intravenous Q12H    vancomycin  1,750 mg Intravenous Q24H    apixaban  5 mg Oral BID    sodium chloride flush  10 mL Intravenous 2 times per day    [START ON 1/12/2021] insulin NPH  30 Units Subcutaneous QAM    insulin lispro  0-18 Units Subcutaneous TID WC    insulin lispro  0-18 Units Subcutaneous 2 times per day    insulin lispro  10 Units Subcutaneous TID WC    sodium chloride flush  10 mL Intravenous 2 times per day    dexamethasone  6 mg Oral Daily    [Held by provider] amLODIPine  10 mg Oral Daily    atorvastatin  10 mg Oral Daily    [Held by provider] chlorthalidone  25 mg Oral Daily    ezetimibe  10 mg Oral Daily    fenofibrate  160 mg Oral Daily    folic acid  1 mg Oral Daily    levothyroxine  75 mcg Oral Daily    mesalamine  800 mg Oral TID    [Held by provider] losartan  100 mg Oral Daily    pantoprazole  40 mg Oral QAM AC    aspirin  81 mg Oral Daily    remdesivir IVPB  100 mg Intravenous Q24H      Infusions:    dextrose      DOPamine 9 mcg/kg/min (01/11/21 2002)    sodium chloride           Objective:   Vitals: /66   Pulse 59   Temp 97.9 °F (36.6 °C) (Axillary)   Resp 26   Ht 6' (1.829 m)   Wt (!) 341 lb 14.9 oz (155.1 kg)   SpO2 91%   BMI 46.37 kg/m²   General appearance: alert and cooperative with exam  Neck: no JVD or bruit  Thyroid : Normal lobes   Lungs: Has Vesicular Breath sounds has some wheezing and some rales  Heart:  regular rate and rhythm  Abdomen: soft, non-tender; bowel sounds normal; no masses,  no organomegaly  Musculoskeletal: Normal  Extremities: extremities normal, , no edema  Neurologic:  Awake, alert, oriented to name, place and time. Cranial nerves II-XII are grossly intact. Motor is  intact. Sensory is intact. ,  and gait is normal.    Assessment:     Patient Active Problem List:     COVID-19       Pneumonia        Diabetes mellitus        Hypertension        Hyperlipidemia        Obstructive sleep apnea        Obesity      Plan:     1. Reviewed POC blood glucose . Labs and X ray results   2. Reviewed Current Medicines   3. On meal/ Correction bolus Humalog/ Basal Lantus Insulin regime / and Oral Hypoglycemic drugs   4. Monitor Blood glucose frequently   5. Modified  the dose of Insulin/ other medicines as needed   6. Will follow     .      Varsha Stubbs MD

## 2021-01-13 NOTE — PROGRESS NOTES
ventilator  Heart:  regular rate and rhythm  Abdomen: soft, non-tender; bowel sounds normal; no masses,  no organomegaly  Musculoskeletal: Normal  Extremities: extremities normal, , no edema  Neurologic: Sedated, intubated and on ventilator    Assessment:     Patient Active Problem List:     COVID-19       Pneumonia        Diabetes mellitus        Hypertension        Hyperlipidemia        Obstructive sleep apnea        Obesity      Plan:     1. Reviewed POC blood glucose . Labs and X ray results   2. Reviewed Current Medicines   3. On  Correction bolus Humalog/ Basal Lantus Insulin regime   4. Monitor Blood glucose frequently   5. Modified  the dose of Insulin/ other medicines as needed   6. Will follow     .      Jay Best MD

## 2021-01-13 NOTE — CARE COORDINATION
Phoned and spoke with patient's son Naomi Lopes. He stated that he is on his way to [facility] now since patient is going to be placed on a ventilator. Naomi Lopes confirmed that patient is from home alone  , normally independent and is still working. Patient has PCP and insurance to assist with RX coverage. Naomi Lopes stated that patient's wife  in 2020 . Case Management following to assist with any potential discharge needs.

## 2021-01-13 NOTE — PROGRESS NOTES
Today's plan:  Patient is just intubated due to hypoxemia, Continue dopamine if needed add levophed continue anticoagulation,echo  LVEF 55%      Admit Date:  1/8/2021    Subjective: intubated      Chief complaints on admission  Chief Complaint   Patient presents with    Shortness of Breath         History of present illness:Carroll is a 67 y. o.year old who  presents with had concerns including Shortness of Breath. Past medical history:    has a past medical history of Diabetes mellitus ([address] Past surgical history: none  Social History: no smoking   reports that he does not drink alcohol or use drugs. Family history: none    No Known Allergies      Objective:   /73   Pulse 74   Temp 98.4 °F (36.9 °C) (Axillary)   Resp 20   Ht 6' (1.829 m)   Wt (!) 341 lb 14.9 oz (155.1 kg)   SpO2 93%   BMI 46.37 kg/m²       Intake/Output Summary (Last 24 hours) at 1/13/2021 1336  Last data filed at 1/13/2021 0427  Gross per 24 hour   Intake 240 ml   Output 2500 ml   Net -2260 ml       TELEMETRY:afib     Physical Exam:  Constitutional:  Well developed, Well nourished, No acute distress, Non-toxic appearance. HENT:  Normocephalic, Atraumatic, Bilateral external ears normal, Oropharynx moist, No oral exudates, Nose normal. Neck- Normal range of motion, No tenderness, Supple, No stridor. Eyes:  PERRL, EOMI, Conjunctiva normal, No discharge. Respiratory:  Normal breath sounds, No respiratory distress, No wheezing, No chest tenderness. ,no use of accessory muscles, diaphragm movement is normal  Cardiovascular: (PMI) apex non displaced,no lifts no thrills, no s3,no s4, Normal heart rate, Normal rhythm, No murmurs, No rubs, No gallops. Carotid arteries pulse and amplitude are normal no bruit, no abdominal bruit noted ( normal abdominal aorta ausculation), femoral arteries pulse and amplitude are normal no bruit, pedal pulses are normal  GI:  Bowel sounds normal, Soft, No tenderness, No masses, No pulsatile masses. : External genitalia appear normal, No masses or lesions. No discharge. No CVA tenderness. Musculoskeletal:  Intact distal pulses, No edema, No tenderness, No cyanosis, No clubbing. Good range of motion in all major joints. No tenderness to palpation or major deformities noted. Back- No tenderness. Integument:  Warm, Dry, No erythema, No rash. Lymphatic:  No lymphadenopathy noted. Neurologic:  Alert & oriented x 3, Normal motor function, Normal sensory function, No focal deficits noted.    Psychiatric:  Affect normal, Judgment normal, Mood normal.     Medications:    insulin glargine  30 Units Subcutaneous Nightly    insulin lispro  0-18 Units Subcutaneous TID WC    insulin lispro  0-9 Units Subcutaneous 2 times per day    albuterol sulfate HFA  4 puff Inhalation Q4H    And    ipratropium  4 puff Inhalation Q4H    chlorhexidine  15 mL Mouth/Throat BID    famotidine (PEPCID) injection  20 mg Intravenous BID    miconazole   Topical BID    cefepime  2 g Intravenous Q12H    vancomycin  1,750 mg Intravenous Q24H    apixaban  5 mg Oral BID    sodium chloride flush  10 mL Intravenous 2 times per day    insulin lispro  10 Units Subcutaneous TID     sodium chloride flush  10 mL Intravenous 2 times per day    dexamethasone  6 mg Oral Daily    [Held by provider] amLODIPine  10 mg Oral Daily    atorvastatin  10 mg Oral Daily    [Held by provider] chlorthalidone  25 mg Oral Daily    ezetimibe  10 mg Oral Daily    fenofibrate  160 mg Oral Daily    folic acid  1 mg Oral Daily    levothyroxine  75 mcg Oral Daily    mesalamine  800 mg Oral TID    [Held by provider] losartan  100 mg Oral Daily    pantoprazole  40 mg Oral QAM AC    aspirin  81 mg Oral Daily    remdesivir IVPB  100 mg Intravenous Q24H      fentanyl 25 mcg/hr (01/13/21 1255)    propofol 50 mcg/kg/min (01/13/21 1310)    dextrose      DOPamine 8 mcg/kg/min (01/13/21 1035)    sodium chloride       polyvinyl alcohol **AND** artificial tears, sodium chloride flush, glucose, dextrose, glucagon (rDNA), dextrose, albuterol sulfate HFA, sodium chloride flush, promethazine **OR** ondansetron, polyethylene glycol, acetaminophen **OR** acetaminophen, guaiFENesin-dextromethorphan, sodium chloride, diazePAM, sodium chloride    Lab Data:  CBC:   Recent Labs     01/11/21 0514 01/12/21 0432 01/13/21 0441   WBC 5.6 6.1 6.8   HGB 11.3* 12.3* 12.5*   HCT 34.6* 37.0* 37.2*   MCV 95.3 94.6 92.5    271 310     BMP:   Recent Labs     01/11/21 0514 01/12/21 0432 01/13/21 0441    136 137   K 4.6 4.5 4.7    99 97*   CO2 27 27 28   BUN 45* 40* 39*   CREATININE 1.4* 1.2 1.1     LIVER PROFILE:   Recent Labs     01/11/21 0514 01/12/21 0432 01/13/21 0441   AST 51* 44* 37   ALT 30 31 29   BILITOT 0.5 0.7 0.8   ALKPHOS 25* 31* 33*     PT/INR:   No results for input(s): PROTIME, INR in the last 72 hours. APTT: No results for input(s): APTT in the last 72 hours. BNP:  No results for input(s): BNP in the last 72 hours. TROPONIN: @TROPONINI:3@      Assessment:  67 y. o.year old who is admitted for          Plan:  1. Bradycardia with afib: not a junctional escape, will CONTINUE dopamine drip for now,echo IS NORMAL, ok to use anticoagulation  2. Respiratory failure: s/p intubation  3. Hypotension and shock: continue dopamine drip  4. COVID 19: contineu treatment as per protocol  5. Pneumonia: as per protocol  6. DM: had hypoglycemia, hold insulin and watch for hypoglycemiaHealth maintenance: exerise and diet  7. All labs, medications and tests reviewed, continue all other medications of all above medical condition listed as is.       Skylar Christensen MD 1/13/2021 1:36 PM

## 2021-01-13 NOTE — ANESTHESIA PROCEDURE NOTES
Airway  Date/Time: 1/13/2021 12:45 PM  Urgency: emergent    Difficult airway    General Information and Staff    Patient location during procedure: ICU  Anesthesiologist: Johna Libman, MD  Resident/CRNA: AMINA Betancourt CRNA  Performed: resident/CRNA     Consent for Airway (if performed for an anesthetic, see related documentation for consents)  Patient identity confirmed: per hospital policy  Consent: Verbal consent obtained.   Risks and benefits: risks, benefits and alternatives were discussed  Consent given by: patient      Code status verified:yes  Indications and Patient Condition  Indications for airway management: hypoxemia and respiratory failure  Spontaneous ventilation: present  Sedation level: deep  Preoxygenated: yes  Patient position: sniffing  MILS maintained throughout  Mask difficulty assessment: vent by bag mask    Final Airway Details  Final airway type: endotracheal airway      Successful airway: ETT  Cuffed: yes   Successful intubation technique: video laryngoscopy  Blade: Elise  Blade size: #4  ETT size (mm): 7.5  Cormack-Lehane Classification: grade I - full view of glottis  Placement verified by: chest auscultation, bronchoscopy and capnometry   Number of attempts at approach: 1  Ventilation between attempts: bag mask  Number of other approaches attempted: 0    no

## 2021-01-13 NOTE — PROGRESS NOTES
pulmonary      SUBJECTIVE: worsening clinical status with poor oxygenation. Currently on bipap 100% fio2     OBJECTIVE    VITALS:  /73   Pulse 74   Temp 98.4 °F (36.9 °C) (Axillary)   Resp 20   Ht 6' (1.829 m)   Wt (!) 341 lb 14.9 oz (155.1 kg)   SpO2 93%   BMI 46.37 kg/m²   HEAD AND FACE EXAM:  No throat injection, no active exudate,no thrush  NECK EXAM;No JVD, no masses, symmetrical  CHEST EXAM; Expansion equal and symmetrical, no masses  LUNG EXAM; Good breath sounds bilaterally. There are expiratory wheezes both lungs, there are crackles at both lung bases  CARDIOVASCULAR EXAM: Positive S1 and S2, no S3 or S4, no clicks ,no murmurs  RIGHT AND LEFT LOWER EXTRIMITY EXAM: No edema, no swelling, no inflamation  CNS EXAM: Alert and oriented X3          LABS   Lab Results   Component Value Date    WBC 6.8 01/13/2021    HGB 12.5 (L) 01/13/2021    HCT 37.2 (L) 01/13/2021    MCV 92.5 01/13/2021     01/13/2021     Lab Results   Component Value Date    CREATININE 1.1 01/13/2021    BUN 39 (H) 01/13/2021     01/13/2021    K 4.7 01/13/2021    CL 97 (L) 01/13/2021    CO2 28 01/13/2021     Lab Results   Component Value Date    INR 1.02 01/08/2021    PROTIME 13.3 01/08/2021        No results found for: PHOS     Recent Labs     01/10/21  1600 01/12/21  0815 01/13/21  0315   PH 7.47* 7.43 7.49*   PO2ART 73* 61* 51*   TJE1JJL 40.0 43.0 41.0   O2SAT 93.7* 90.5* 86.2*         Wt Readings from Last 3 Encounters:   01/10/21 (!) 341 lb 14.9 oz (155.1 kg)               ASSESMENT  Ac resp failure  covid pneumonia  abraham pneumonia        PLAN  1. cpm  2. Intubate and ventilate. d/w pt in am and he is agreeable    1/13/2021  Dominick Rossi M.D.

## 2021-01-13 NOTE — PROGRESS NOTES
Hospitalist Progress Note      Name:  Anitra Macedo /Age/Sex: 1945  (67 y.o. male)   MRN & CSN:  [id number] & [id number] Admission Date/Time: 2021  4:54 PM   Location:  -A PCP: Terrie Mccall MD       Hospital Day: [address] [facility] Hospital Course:   Anitra Macedo is a 67 y.o. obese male who presented to the [facility] ED with SOB diagnosed with COVID-19 and admitted to the medicine service on med/surg. His past medical history includes CAD, HTN, HLD, AMINA, AA, Hypothyroidism. CT of the chest contains parenchymal findings typical of COVID-19 pneumonia and patient has received Remdesivir, Steroid, and Convalescent Plasma. He is being followed for bradycardia and afib by Dr. Blayne Hayes (Cardiology), Dr. Ivana Harris for DM (Endocrinology), Dr. Thong Priest for Pulmonology. On hospital day 3 patient became hypoxic and had severe hypoglycemia triggering a rapid response. Patient recovered after D50IV bolus and a D10 gtt was started. Cardiology started dopamine gtt. O2 was increased from 12L to 30L. His oxygen dependency again worsened by day 5 (21) and was placed on bipap, then intubated later that night. Assessment and Plan:     Acute respiratory failure with hypoxia secondary to COVID-19 viral pneumonia infection  SIRS criteria met at time of admission secondary to sepsis from viral pneumonia  COVID-19 positive: 2021  IV antibiotics, IV steroids initiated on 2021   Convalescent plasma given: 2021    Remdesivir:   Oxygen requirement today: high fiO2 via airvo  · Morbid obesity with BMI of 47  · HTN, norvasc  · HLD, fenofibrate  · Chronic anxiety disorder, valium  · DM2 on PO medications only, uncontrolled  · Hypothyroidism, synthroid    Tolerating vent well  Appreciate cardio and pulm assistance  Continue to prone as tolerated.      Diet DIET CARDIAC; Carb Control: 4 carb choices (60 gms)/meal   DVT Prophylaxis [] Lovenox, []  Heparin, [] SCDs, []No VTE prophylaxis, patient ambulating   GI Prophylaxis [] PPI, [] H2 Blocker, [] No GI prophylaxis, patient is receiving diet/Tube Feeds   Code Status Full Code   Disposition guarded   MDM [] Low, [] Moderate,[x]  High  Patient's risk as above due to     [] One or more chronic illnesses with severe exacerbation or progression    [x] Acute or chronic illnesses or injuries that pose a threat to life or bodily function    [] An abrupt change in neurological status    [] Decision not to resuscitate     [] Drug therapy requiring intensive monitoring for toxicity      Subjective:     Pt S&E. Laying comfortably on his belly. RN states it took 7 people to manually prone him  Significantly improved tolerance of vent support once proned   No reported fevers. No ROS as he is intubated and sedated     Objective: Intake/Output Summary (Last 24 hours) at 1/13/2021 0821  Last data filed at 1/13/2021 0427  Gross per 24 hour   Intake 840 ml   Output 3350 ml   Net -2510 ml      Vitals:   Vitals:    01/13/21 0429   BP:    Pulse:    Resp: 23   Temp:    SpO2:      Physical Exam:    GEN sedated male, laying in bed in no apparent distress. EYES Pupils are equally round. No scleral erythema, discharge, or conjunctivitis. HENT Mucous membranes are moist. +ETT +NGT  NECK No apparent thyromegaly or masses. RESP +rales or rhonchi. Symmetric chest movement while on invasive mechanical oxygen support  CARDIO/VASC S1/S2 auscultated. Regular rate without appreciable murmurs, rubs, or gallops. Peripheral pulses equal bilaterally and palpable. +1 pitting edema in extremities   GI Abdomen is soft without significant tenderness, masses, or guarding. Bowel sounds are normoactive. Rectal exam deferred.  Alan catheter is present. MSK No gross joint deformities. No spontaneous movement noted  SKIN Normal coloration, warm, dry. NEURO Does not follow directions. No response to verbal or painful stimuli  PSYCH Sedated on propofol.     Medications:   Medications:    insulin glargine  30 Units Subcutaneous Nightly    insulin lispro  0-18 Units Subcutaneous TID WC    insulin lispro  0-9 Units Subcutaneous 2 times per day    miconazole   Topical BID    cefepime  2 g Intravenous Q12H    vancomycin  1,750 mg Intravenous Q24H    apixaban  5 mg Oral BID    sodium chloride flush  10 mL Intravenous 2 times per day    insulin lispro  10 Units Subcutaneous TID WC    sodium chloride flush  10 mL Intravenous 2 times per day    dexamethasone  6 mg Oral Daily    [Held by provider] amLODIPine  10 mg Oral Daily    atorvastatin  10 mg Oral Daily    [Held by provider] chlorthalidone  25 mg Oral Daily    ezetimibe  10 mg Oral Daily    fenofibrate  160 mg Oral Daily    folic acid  1 mg Oral Daily    levothyroxine  75 mcg Oral Daily    mesalamine  800 mg Oral TID    [Held by provider] losartan  100 mg Oral Daily    pantoprazole  40 mg Oral QAM AC    aspirin  81 mg Oral Daily    remdesivir IVPB  100 mg Intravenous Q24H      Infusions:    dextrose      DOPamine 9 mcg/kg/min (01/13/21 0809)    sodium chloride       PRN Meds:     sodium chloride flush, 10 mL, PRN      glucose, 15 g, PRN      dextrose, 12.5 g, PRN      glucagon (rDNA), 1 mg, PRN      dextrose, 100 mL/hr, PRN      albuterol sulfate HFA, 2 puff, Q6H PRN      sodium chloride flush, 10 mL, PRN      promethazine, 12.5 mg, Q6H PRN    Or      ondansetron, 4 mg, Q6H PRN      polyethylene glycol, 17 g, Daily PRN      acetaminophen, 650 mg, Q6H PRN    Or      acetaminophen, 650 mg, Q6H PRN      guaiFENesin-dextromethorphan, 5 mL, Q4H PRN      sodium chloride, , PRN      diazePAM, 5 mg, TID PRN      sodium chloride, 30 mL, PRN          Electronically signed by Burnett Litten, DO on 1/13/2021 at 8:21 AM

## 2021-01-13 NOTE — PROGRESS NOTES
Hospitalist Progress Note      Name:  Mildred Conway /Age/Sex: 1945  (67 y.o. male)   MRN & CSN:  [id number] & [id number] Admission Date/Time: 2021  4:54 PM   Location:  -A PCP: Gifty Keane MD         Hospital Day: 6    Assessment and Plan:   Mildred Conway is a 67 y.o.  male  who presents with COVID-19    Acute Hypoxic Respiratory Failure:   · due to COVID 19  · Requiring O2 via NC on admission, now on Airvo   · CXR no acute process  · CT chest with pulmonary parenchymal findings typical of mild COVID-19 pneumonia. · ABG  no hypoxemia. · Pulse oximetry continuously.      COVID 19 Pneumonia  · Presented with SOB, fatigue 2020  · CT PE as above  · Inflammatory markers - elevated D dimer  · Started on Steroid  · Remdesivir day 3  · Convalescent plasma still not given. Will follow up. · DVT prophylaxis with Lovenox twice daily. AF in Slow RVR  · Has associated hypertension. · Started on dopamine by cardiology. · CHADSVASV 4.  Started on apixaban. · For echocardiogram  · Cardiology following     Type 2 DM: on Toujeo, Sliding scale. Hypoglycemia protocol. Accucheck. · Episode of hypoglycemia yesterday. · Insulin was adjusted. · Blood sugar more controlled. · Endocrinology following. Coronary artery disease: Status post coronary artery bypass graft. Continue aspirin, Lipitor. Hyperlipidemia: continue statin  Hypertension: Blood pressure controlled. Held for now because of hypotension. AMINA on CPAP  AAA S/P Endovascular Repair  Hypothyroidism: Will check TSH. continue Synthroid. Discussed with son Justin Starkey.       --> confirmed full code status  --> discussed adherence to medical treatment    Diet DIET CARDIAC; Carb Control: 4 carb choices (60 gms)/meal   DVT Prophylaxis [] Lovenox, []  Heparin, [] SCDs, [] Warfarin  [x] NOAC     GI Prophylaxis [x] PPI,  [] H2 Blocker,  [] Carafate,  [] Diet/Tube Feeds   Code Status Full Code   MDM [] Low, [] Moderate,[x]  High     History of

## 2021-01-13 NOTE — PROGRESS NOTES
Progress Note( Dr. Sreedhar Wills)  1/13/2021PCP: Altagracia Jiménez MD    Admitted For :Hypoxia, shortness of breath, Covid pneumonia    Consulted For: Better control of blood glucose    Interval History: Patient is respiratory status got worse last night. O2 saturation dropped to mid 70s. He was moved to [address] [place] [address] intensive care unit this time he was placed on high flow oxygen. Feels more comfortable this evening  Pt was refusing to take higher dose of Lantus nd Humalog insukinas prescribed due to his higher blood glucose . He is afraid of Hypoglycemis that happened once    So re    Denies any chest pains,   Yes SOB . Denies nausea or vomiting. No new bowel or bladder symptoms.        Intake/Output Summary (Last 24 hours) at 1/13/2021 0625  Last data filed at 1/13/2021 0427  Gross per 24 hour   Intake 840 ml   Output 3550 ml   Net -2710 ml       DATA    CBC:   Recent Labs     01/11/21  0514 01/12/21  0432 01/13/21  0441   WBC 5.6 6.1 6.8   HGB 11.3* 12.3* 12.5*    271 310    CMP:  Recent Labs     01/11/21  0514 01/12/21  0432 01/13/21  0441    136 137   K 4.6 4.5 4.7    99 97*   CO2 27 27 28   BUN 45* 40* 39*   CREATININE 1.4* 1.2 1.1   CALCIUM 8.1* 8.2* 8.4   PROT 5.5* 6.1* 6.2*   LABALBU 3.2* 3.3* 3.3*   BILITOT 0.5 0.7 0.8   ALKPHOS 25* 31* 33*   AST 51* 44* 37   ALT 30 31 29     Lipids:   Lab Results   Component Value Date    CHOL 115 10/29/2012    HDL 39 10/29/2012    TRIG 80 10/29/2012     Glucose:  Recent Labs     01/12/21  1723 01/12/21  2100 01/13/21  0120   POCGLU 367* 328* 270*     [id number]:No results found for: LABA1C  High Sensitivity TSH:   Lab Results   Component Value Date    TSHHS 2.320 10/29/2012     Free T3: No results found for: FT3  Free T4:  Lab Results   Component Value Date    T4FREE 0.89 10/29/2012       Xr Chest Portable    Result Date: 1/8/2021  EXAMINATION: ONE XRAY VIEW OF THE CHEST 1/8/2021 6:12 pm COMPARISON: 02/17/2014 radiograph HISTORY: ORDERING SYSTEM PROVIDED HISTORY: sob \       No acute finding in the chest.     Ct Chest Pulmonary Embolism W Contrast    Result Date: 1/9/2021  EXAMINATION: CTA OF THE CHEST 1/9/2021 4:02 pm       1. No pulmonary embolism. 2. Pulmonary parenchymal findings typical of mild COVID-19 pneumonia. Note that CT pulmonary findings of COVID-19 show overlap with other disease entities including H1N1 influenza, other viral pneumonia (i.e. adenovirus, CMV), organizing pneumonia, alveolar proteinosis and acute interstitial pneumonitis. 3. Trace bilateral pleural effusion. 4. Emphysema. 5. Calcific atherosclerosis aorta and coronary arteries. 6. Mild mediastinal and right hilar lymph node enlargement is almost certainly reactive. This should be followed with IV contrast-enhanced CT chest in 3 months to ensure stability.        Scheduled Medicines   Medications:    insulin glargine  30 Units Subcutaneous Nightly    insulin lispro  0-18 Units Subcutaneous TID WC    insulin lispro  0-9 Units Subcutaneous 2 times per day    miconazole   Topical BID    cefepime  2 g Intravenous Q12H    vancomycin  1,750 mg Intravenous Q24H    apixaban  5 mg Oral BID    sodium chloride flush  10 mL Intravenous 2 times per day    insulin lispro  10 Units Subcutaneous TID WC    sodium chloride flush  10 mL Intravenous 2 times per day    dexamethasone  6 mg Oral Daily    [Held by provider] amLODIPine  10 mg Oral Daily    atorvastatin  10 mg Oral Daily    [Held by provider] chlorthalidone  25 mg Oral Daily    ezetimibe  10 mg Oral Daily    fenofibrate  160 mg Oral Daily    folic acid  1 mg Oral Daily    levothyroxine  75 mcg Oral Daily    mesalamine  800 mg Oral TID    [Held by provider] losartan  100 mg Oral Daily    pantoprazole  40 mg Oral QAM AC    aspirin  81 mg Oral Daily    remdesivir IVPB  100 mg Intravenous Q24H      Infusions:    dextrose      DOPamine 9 mcg/kg/min (01/13/21 0233)    sodium chloride           Objective:   Vitals: /71 Pulse 65   Temp 98.3 °F (36.8 °C) (Axillary)   Resp 23   Ht 6' (1.829 m)   Wt (!) 341 lb 14.9 oz (155.1 kg)   SpO2 91%   BMI 46.37 kg/m²   General appearance: alert and cooperative with exam  Neck: no JVD or bruit  Thyroid : Normal lobes   Lungs: Has Vesicular Breath sounds has some wheezing and some rales  Heart:  regular rate and rhythm  Abdomen: soft, non-tender; bowel sounds normal; no masses,  no organomegaly  Musculoskeletal: Normal  Extremities: extremities normal, , no edema  Neurologic:  Awake, alert, oriented to name, place and time. Cranial nerves II-XII are grossly intact. Motor is  intact. Sensory is intact. ,  and gait is normal.    Assessment:     Patient Active Problem List:     COVID-19       Pneumonia        Diabetes mellitus        Hypertension        Hyperlipidemia        Obstructive sleep apnea        Obesity      Plan:     1. Reviewed POC blood glucose . Labs and X ray results   2. Reviewed Current Medicines   3. On meal/ Correction bolus Humalog/ Basal Lantus Insulin regime / and Oral Hypoglycemic drugs   4. Monitor Blood glucose frequently   5. Modified  the dose of Insulin/ other medicines as needed   6. Will follow     .      Ariana Godinez MD

## 2021-01-13 NOTE — PROGRESS NOTES
[address] [facility]  consulted by Dr. Naga Urias for monitoring and adjustment. Indication for treatment: Pneumonia  Goal trough: 15-20 mcg/mL    Pertinent Laboratory Values:   Temp Readings from Last 3 Encounters:   01/13/21 99.9 °F (37.7 °C) (Rectal)     Recent Labs     01/11/21  0514 01/12/21  0432 01/13/21  0441   WBC 5.6 6.1 6.8     Recent Labs     01/11/21  0514 01/12/21  0432 01/13/21  0441   BUN 45* 40* 39*   CREATININE 1.4* 1.2 1.1     Estimated Creatinine Clearance: 88 mL/min (based on SCr of 1.1 mg/dL). Intake/Output Summary (Last 24 hours) at 1/13/2021 1656  Last data filed at 1/13/2021 1330  Gross per 24 hour   Intake 240 ml   Output 1850 ml   Net -1610 ml       Pertinent Cultures:  Date    Source    Results  1/8/21   Covid-19   Positive  1/8/21   Blood    NGTD  1/11/21  MRSA nasal   Ordered    Vancomycin level:   TROUGH:  No results for input(s): VANCOTROUGH in the last 72 hours. RANDOM:  No results for input(s): VANCORANDOM in the last 72 hours. Assessment:  · WBC and temperature: WNL  · SCr, BUN, and urine output: Stable  · Day(s) of therapy: # 3  · Vancomycin concentration: to be collected    Plan:  · Renal stable/ good, crcl= 88.  · CONTINUE SAME DOSE AND FREQUENCY=   ·  Vancomycin 1750 mg IVPB q24h  · Predicted AUC/LOGAN: 528  · Predicted trough: ~15  · Still -Plan to check a trough level prior to 4th total dose  · Pharmacy will continue to monitor patient and adjust therapy as indicated    Vinh 3 1/14 @ 5463    Thank you for the consult.   Olinda Kan [address],  [facility]   1/13/2021 4:56 PM 242.9

## 2021-01-14 NOTE — PROGRESS NOTES
Pt proned to improve lung aeration. Chest x ray done after ETT was advanced. Increasing fentanyl gtt for sedation while proned.

## 2021-01-14 NOTE — PROGRESS NOTES
[address] [facility]  consulted by Dr. Juliette Lieberman for monitoring and adjustment. Indication for treatment: Pneumonia  Goal trough: 15 mcg/mL, AUC/LOGAN 400-600    Pertinent Laboratory Values:   Temp Readings from Last 3 Encounters:   01/14/21 100.8 °F (38.2 °C) (Rectal)     Recent Labs     01/12/21  0432 01/13/21  0441 01/14/21  0510   WBC 6.1 6.8 8.1     Recent Labs     01/12/21  0432 01/13/21  0441 01/14/21  0510   BUN 40* 39* 46*   CREATININE 1.2 1.1 1.3     Estimated Creatinine Clearance: 74 mL/min (based on SCr of 1.3 mg/dL). Intake/Output Summary (Last 24 hours) at 1/14/2021 1156  Last data filed at 1/14/2021 0443  Gross per 24 hour   Intake 943.01 ml   Output 1920 ml   Net -976.99 ml       Pertinent Cultures:  Date    Source    Results  1/8/21   Covid-19   Positive  1/8/21   Blood    NGTD  1/11/21  MRSA nasal   NGTD  1/13/21  Respiratory   Pending    Vancomycin level:   TROUGH:    Recent Labs     01/14/21  1030   VANCOTROUGH 9.7*     RANDOM:  No results for input(s): VANCORANDOM in the last 72 hours. Assessment:  · WBC and temperature: WBC WNL; Tmax = 100.9F  · SCr, BUN, and urine output: Scr stable, BUN increasing  · Day(s) of therapy: #4  · Vancomycin concentration:  · 1/14: 9.7, collected after 3 doses, slightly below goal    Plan:  · Vancomycin 1750 mg IVPB q24h  · Trough level: sub-therapeutic @ 9.7 after x3 doses, anticipate level closer to goal when steady-state is reached  · BUN is trending up and given advanced age, will not increase dose at this time  · Calculated AUC/LOGAN at steady-state: 471, therapeutic  · MRSA nasal screen is negative, can consider de-escalation for indication of MRSA pneumonia if appropriate per clinical course  · Repeat level in 72h to monitor for accumulation  · Pharmacy will continue to monitor patient and adjust therapy as indicated    VANCOMYCIN CONCENTRATION SCHEDULED FOR 1/17 @ 7582    Thank you for the consult.   [address] [place] [address] Sean Ledbetter 1/14/2021 11:56 AM

## 2021-01-14 NOTE — PROGRESS NOTES
Today's plan:  Patient is intubated due to hypoxemia, Continue dopamine if needed add levophed continue anticoagulation,echo  LVEF 55%      Admit Date:  1/8/2021    Subjective: intubated      Chief complaints on admission  Chief Complaint   Patient presents with    Shortness of Breath         History of present illness:Carroll is a 67 y. o.year old who  presents with had concerns including Shortness of Breath. Past medical history:    has a past medical history of Diabetes mellitus ([address] Past surgical history: none  Social History: no smoking   reports that he does not drink alcohol or use drugs. Family history: none    No Known Allergies      Objective:   /64   Pulse 53   Temp 100.4 °F (38 °C) (Rectal)   Resp 16   Ht 6' 0.01\" (1.829 m)   Wt (!) 341 lb 14.9 oz (155.1 kg)   SpO2 99%   BMI 46.36 kg/m²       Intake/Output Summary (Last 24 hours) at 1/14/2021 1249  Last data filed at 1/14/2021 1231  Gross per 24 hour   Intake 943.01 ml   Output 2320 ml   Net -1376.99 ml       TELEMETRY:afib     Physical Exam:  Constitutional:  Well developed, Well nourished, No acute distress, Non-toxic appearance. HENT:  Normocephalic, Atraumatic, Bilateral external ears normal, Oropharynx moist, No oral exudates, Nose normal. Neck- Normal range of motion, No tenderness, Supple, No stridor. Eyes:  PERRL, EOMI, Conjunctiva normal, No discharge. Respiratory:  Normal breath sounds, No respiratory distress, No wheezing, No chest tenderness. ,no use of accessory muscles, diaphragm movement is normal  Cardiovascular: (PMI) apex non displaced,no lifts no thrills, no s3,no s4, Normal heart rate, Normal rhythm, No murmurs, No rubs, No gallops.  Carotid arteries pulse and amplitude are normal no bruit, no abdominal bruit noted ( normal abdominal aorta ausculation), femoral arteries pulse and amplitude are normal no bruit, pedal pulses are normal  GI:  Bowel sounds normal, Soft, No tenderness, No masses, No pulsatile masses. : External genitalia appear normal, No masses or lesions. No discharge. No CVA tenderness. Musculoskeletal:  Intact distal pulses, No edema, No tenderness, No cyanosis, No clubbing. Good range of motion in all major joints. No tenderness to palpation or major deformities noted. Back- No tenderness. Integument:  Warm, Dry, No erythema, No rash. Lymphatic:  No lymphadenopathy noted. Neurologic:  Alert & oriented x 3, Normal motor function, Normal sensory function, No focal deficits noted.    Psychiatric:  Affect normal, Judgment normal, Mood normal.     Medications:    insulin glargine  35 Units Subcutaneous Nightly    insulin NPH  15 Units Subcutaneous QAM    albuterol sulfate HFA  4 puff Inhalation Q4H    And    ipratropium  4 puff Inhalation Q4H    chlorhexidine  15 mL Mouth/Throat BID    famotidine (PEPCID) injection  20 mg Intravenous BID    insulin lispro  0-24 Units Subcutaneous Q4H    miconazole   Topical BID    cefepime  2 g Intravenous Q12H    vancomycin  1,750 mg Intravenous Q24H    apixaban  5 mg Oral BID    sodium chloride flush  10 mL Intravenous 2 times per day    sodium chloride flush  10 mL Intravenous 2 times per day    dexamethasone  6 mg Oral Daily    [Held by provider] amLODIPine  10 mg Oral Daily    atorvastatin  10 mg Oral Daily    [Held by provider] chlorthalidone  25 mg Oral Daily    ezetimibe  10 mg Oral Daily    fenofibrate  160 mg Oral Daily    folic acid  1 mg Oral Daily    levothyroxine  75 mcg Oral Daily    mesalamine  800 mg Oral TID    [Held by provider] losartan  100 mg Oral Daily    pantoprazole  40 mg Oral QAM AC    aspirin  81 mg Oral Daily      norepinephrine 4 mcg/min (01/14/21 0822)    fentanyl 100 mcg/hr (01/14/21 0904)    propofol 60 mcg/kg/min (01/14/21 1226)    dextrose      DOPamine 6 mcg/kg/min (01/14/21 1231)     polyvinyl alcohol **AND** artificial tears, sodium chloride flush, glucose, dextrose, glucagon (rDNA), dextrose, albuterol sulfate HFA, sodium chloride flush, promethazine **OR** ondansetron, polyethylene glycol, acetaminophen **OR** acetaminophen, guaiFENesin-dextromethorphan, diazePAM, sodium chloride    Lab Data:  CBC:   Recent Labs     01/12/21 0432 01/13/21 0441 01/14/21  0510   WBC 6.1 6.8 8.1   HGB 12.3* 12.5* 11.8*   HCT 37.0* 37.2* 35.4*   MCV 94.6 92.5 93.2    310 291     BMP:   Recent Labs     01/12/21 0432 01/13/21  0441 01/14/21  0510    137 135   K 4.5 4.7 4.4   CL 99 97* 96*   CO2 27 28 27   BUN 40* 39* 46*   CREATININE 1.2 1.1 1.3     LIVER PROFILE:   Recent Labs     01/12/21 0432 01/13/21 0441 01/14/21  0510   AST 44* 37 29   ALT 31 29 24   BILITOT 0.7 0.8 1.2*   ALKPHOS 31* 33* 34*     PT/INR:   No results for input(s): PROTIME, INR in the last 72 hours. APTT: No results for input(s): APTT in the last 72 hours. BNP:  No results for input(s): BNP in the last 72 hours. TROPONIN: @TROPONINI:3@      Assessment:  67 y. o.year old who is admitted for          Plan:  1. Bradycardia with afib: not a junctional escape, will CONTINUE dopamine drip for now,echo IS NORMAL, ok to use anticoagulation  2. Respiratory failure: s/p intubation  3. Hypotension and shock: continue dopamine drip  4. COVID 19: contineu treatment as per protocol  5. Pneumonia: as per protocol  6. DM: had hypoglycemia, hold insulin and watch for hypoglycemiaHealth maintenance: exerise and diet  7. All labs, medications and tests reviewed, continue all other medications of all above medical condition listed as is.       Rm Nova MD 1/14/2021 12:49 PM

## 2021-01-14 NOTE — PROGRESS NOTES
pulmonary      SUBJECTIVE:  Intubated on vent     OBJECTIVE    VITALS:  BP (!) 105/57   Pulse 59   Temp 99 °F (37.2 °C) (Rectal)   Resp 19   Ht 6' (1.829 m)   Wt (!) 341 lb 14.9 oz (155.1 kg)   SpO2 90%   BMI 46.37 kg/m²   HEAD AND FACE EXAM:  No throat injection, no active exudate,no thrush  NECK EXAM;No JVD, no masses, symmetrical  CHEST EXAM; Expansion equal and symmetrical, no masses  LUNG EXAM; Good breath sounds bilaterally. There are expiratory wheezes both lungs, there are crackles at both lung bases  CARDIOVASCULAR EXAM: Positive S1 and S2, no S3 or S4, no clicks ,no murmurs  RIGHT AND LEFT LOWER EXTRIMITY EXAM: No edema, no swelling, no inflamation            LABS   Lab Results   Component Value Date    WBC 8.1 01/14/2021    HGB 11.8 (L) 01/14/2021    HCT 35.4 (L) 01/14/2021    MCV 93.2 01/14/2021     01/14/2021     Lab Results   Component Value Date    CREATININE 1.3 01/14/2021    BUN 46 (H) 01/14/2021     01/14/2021    K 4.4 01/14/2021    CL 96 (L) 01/14/2021    CO2 27 01/14/2021     Lab Results   Component Value Date    INR 1.02 01/08/2021    PROTIME 13.3 01/08/2021        No results found for: PHOS     Recent Labs     01/12/21  0815 01/13/21  0315 01/13/21  1400   PH 7.43 7.49* 7.43   PO2ART 61* 51* 72*   PUG9GER 43.0 41.0 47.0*   O2SAT 90.5* 86.2* 92.8*         Wt Readings from Last 3 Encounters:   01/10/21 (!) 341 lb 14.9 oz (155.1 kg)        EXAMINATION:   ONE X-RAY VIEW OF THE CHEST       1/14/2021 3:16 am       COMPARISON:   Chest radiograph dated January 13, 2021       HISTORY:   ORDERING SYSTEM PROVIDED HISTORY:  Ventilator   TECHNOLOGIST PROVIDED HISTORY:   Reason for exam:  Ventilator   Reason for Exam:  Pt on ventilator   Acuity: Acute   Type of Exam: Subsequent/Follow-up       FINDINGS:   Median sternotomy wires are noted.  The tip of the ET tube is 5.7 cm above   the jalil.  Cardiomegaly is noted.  Diffuse interstitial prominence is seen.    Patchy bilateral airspace opacities are noted. Campos Greta is no significant   change.  There is no evidence of a pneumothorax.  Right-sided central venous   catheter is in place. Campos Greta is a small right-sided pleural effusion. Campos Greta   is a trace left pleural effusion.           Impression   1. Tip of the ET tube is 5.7 cm above the jalil. 2. Bilateral patchy airspace opacities with diffuse interstitial prominence. No significant change. 3. Bilateral pleural effusions.  No significant change.           Order History              ASSESMENT  Ac resp failure  covid pneumonia  abraham pneumonia        PLAN  1. cpm  2. Cont full vent support  3.  Push et tune in 2 cm and redo cxr    1/14/2021  Gifty Woo M.D.

## 2021-01-14 NOTE — PROGRESS NOTES
RT here and increasing peep for SpO2 86%. Increasing levophed gtt for BP. Will need to prone this patient. Weaning slowly dopamine gtt since weight based and running at a high rate.

## 2021-01-14 NOTE — CONSULTS
Edema, Weight     Discharge Planning:     Too soon to determine     Electronically signed by Ledy Tirado MS, RD, LD on 1/14/21 at 11:47 AM EST    Contact: [phone]

## 2021-01-15 NOTE — PROGRESS NOTES
pulmonary      SUBJECTIVE:  Sedated and on vent     OBJECTIVE    VITALS:  BP (!) 122/58   Pulse 65   Temp 98 °F (36.7 °C) (Rectal)   Resp 16   Ht 6' 0.01\" (1.829 m)   Wt (!) 341 lb 14.9 oz (155.1 kg)   SpO2 (!) 89%   BMI 46.36 kg/m²   HEAD AND FACE EXAM:  No throat injection, no active exudate,no thrush  NECK EXAM;No JVD, no masses, symmetrical  CHEST EXAM; Expansion equal and symmetrical, no masses  LUNG EXAM; Good breath sounds bilaterally. There are expiratory wheezes both lungs, there are crackles at both lung bases  CARDIOVASCULAR EXAM: Positive S1 and S2, no S3 or S4, no clicks ,no murmurs  RIGHT AND LEFT LOWER EXTRIMITY EXAM: No edema, no swelling, no inflamation            LABS   Lab Results   Component Value Date    WBC 8.9 01/15/2021    HGB 9.0 (L) 01/15/2021    HCT 27.1 (L) 01/15/2021    MCV 94.1 01/15/2021     01/15/2021     Lab Results   Component Value Date    CREATININE 1.3 01/14/2021    BUN 46 (H) 01/14/2021     01/14/2021    K 4.4 01/14/2021    CL 96 (L) 01/14/2021    CO2 27 01/14/2021     Lab Results   Component Value Date    INR 1.02 01/08/2021    PROTIME 13.3 01/08/2021        No results found for: PHOS     Recent Labs     01/13/21  1400 01/14/21  0600 01/15/21  0600   PH 7.43 7.38 7.31*   PO2ART 72* 58* 113*   [id number] 47.0* 50.0* 54.0*   O2SAT 92.8* 88.8* 96.2         Wt Readings from Last 3 Encounters:   01/10/21 (!) 341 lb 14.9 oz (155.1 kg)     EXAMINATION:   ONE XRAY VIEW OF THE CHEST       1/15/2021 5:22 am       COMPARISON:   01/14/2021       HISTORY:   ORDERING SYSTEM PROVIDED HISTORY: ventilator   TECHNOLOGIST PROVIDED HISTORY:   Reason for exam:->ventilator   Reason for Exam: Pt on ventilator   Acuity: Acute   Type of Exam: Subsequent/Follow-up       FINDINGS:   An endotracheal tube, enteric tube and right PICC are stable in appearance.    The mediastinal and cardiac contours are stable.  There are abnormal airspace   opacities extending into the upper and lower lobes, not significantly   changed.  There is no pleural effusion or pneumothorax.           Impression   Persistent bilateral airspace opacities extending into the upper and lower   lobes most suggestive of an atypical infectious process.                   ASSESMENT  Ac resp failure  covid pneumonia  abraham pneumonia        PLAN  1. Cefepime and vanc  2. On decadron and remdesivir and received plasma  3.  He remains on high vent parameters so not weanABLE AT PRESENT    1/15/2021  Stuart Jacobs M.D.

## 2021-01-15 NOTE — PLAN OF CARE
Problem: Airway Clearance - Ineffective  Goal: Achieve or maintain patent airway  Outcome: Ongoing     Problem: Gas Exchange - Impaired  Goal: Absence of hypoxia  Outcome: Ongoing  Goal: Promote optimal lung function  Outcome: Ongoing     Problem: Breathing Pattern - Ineffective  Goal: Ability to achieve and maintain a regular respiratory rate  Outcome: Ongoing     Problem:  Body Temperature -  Risk of, Imbalanced  Goal: Ability to maintain a body temperature within defined limits  Outcome: Ongoing  Goal: Will regain or maintain usual level of consciousness  Outcome: Ongoing  Goal: Complications related to the disease process, condition or treatment will be avoided or minimized  Outcome: Ongoing     Problem: Isolation Precautions - Risk of Spread of Infection  Goal: Prevent transmission of infection  Outcome: Ongoing     Problem: Nutrition Deficits  Goal: Optimize nutritional status  Outcome: Ongoing     Problem: Risk for Fluid Volume Deficit  Goal: Maintain normal heart rhythm  Outcome: Ongoing  Goal: Maintain absence of muscle cramping  Outcome: Ongoing  Goal: Maintain normal serum potassium, sodium, calcium, phosphorus, and pH  Outcome: Ongoing     Problem: Loneliness or Risk for Loneliness  Goal: Demonstrate positive use of time alone when socialization is not possible  Outcome: Ongoing     Problem: Fatigue  Goal: Verbalize increase energy and improved vitality  Outcome: Ongoing     Problem: Patient Education: Go to Patient Education Activity  Goal: Patient/Family Education  Outcome: Ongoing     Problem: Skin Integrity:  Goal: Will show no infection signs and symptoms  Description: Will show no infection signs and symptoms  Outcome: Ongoing  Goal: Absence of new skin breakdown  Description: Absence of new skin breakdown  Outcome: Ongoing     Problem: Falls - Risk of:  Goal: Will remain free from falls  Description: Will remain free from falls  Outcome: Ongoing  Goal: Absence of physical injury  Description: Absence of physical injury  Outcome: Ongoing     Problem: Restraint Use - Nonviolent/Non-Self-Destructive Behavior:  Goal: Absence of restraint indications  Description: Absence of restraint indications  Outcome: Ongoing  Goal: Absence of restraint-related injury  Description: Absence of restraint-related injury  Outcome: Ongoing

## 2021-01-15 NOTE — PROGRESS NOTES
Attempted PIV placement x 3 RN's without success. Dr. Gonzalez Singer notified. 1200 Orderr received to consult IV team for PICC exchange or PIV placement.

## 2021-01-15 NOTE — PROGRESS NOTES
[id number] [facility]  consulted by Dr. Wilber Love for monitoring and adjustment. Indication for treatment: Pneumonia  Goal trough: 15 mcg/mL, AUC/LOGAN 400-600    Pertinent Laboratory Values:   Temp Readings from Last 3 Encounters:   01/15/21 97.7 °F (36.5 °C) (Rectal)     Recent Labs     01/13/21  0441 01/14/21  0510 01/15/21  0555   WBC 6.8 8.1 8.9     Recent Labs     01/13/21  0441 01/14/21  0510   BUN 39* 46*   CREATININE 1.1 1.3     Estimated Creatinine Clearance: 74 mL/min (based on SCr of 1.3 mg/dL). Intake/Output Summary (Last 24 hours) at 1/15/2021 1222  Last data filed at 1/15/2021 0945  Gross per 24 hour   Intake 4029.49 ml   Output 1930 ml   Net 2099.49 ml       Pertinent Cultures:  Date    Source    Results  1/8/21   Covid-19   Positive  1/8/21   Blood    NGTD  1/11/21  MRSA nasal   NGTD  1/13/21  Respiratory   Pending    Vancomycin level:   TROUGH:    Recent Labs     01/14/21  1030   VANCOTROUGH 9.7*     RANDOM:  No results for input(s): VANCORANDOM in the last 72 hours. Assessment:  · WBC and temperature: WBC WNL; Tmax = 100.9F  · SCr, BUN, and urine output: Scr stable, BUN increasing  · Day(s) of therapy: # 5  · Vancomycin concentration:  · 1/14: 9.7, collected after 3 doses, slightly below goal    Plan:  · Renal, crcl= 74.  · CONTINUE SAME DOSE AND FREQUENCY = Vancomycin 1750 mg IVPB q24h.   · Trough level: sub-therapeutic @ 9.7 after x3 doses, anticipate level closer to goal when steady-state is reached  · BUN is trending up and given advanced age, will not increase dose at this time  · Calculated AUC/LOGAN at steady-state: 471, therapeutic  · MRSA nasal screen is negative, can consider de-escalation for indication of MRSA pneumonia if appropriate per clinical course  · Repeat level in 72h to monitor for accumulation  · Pharmacy will continue to monitor patient and adjust therapy as indicated    VANCOMYCIN CONCENTRATION SCHEDULED FOR 1/17 @ 3535    Thank you for the

## 2021-01-15 NOTE — PROGRESS NOTES
Today's plan:  Patient is intubated due to hypoxemia, Continue dopamine if needed add levophed continue anticoagulation,echo  LVEF 55%, will sign off      Admit Date:  1/8/2021    Subjective: intubated      Chief complaints on admission  Chief Complaint   Patient presents with    Shortness of Breath         History of present illness:Carroll is a 67 y. o.year old who  presents with had concerns including Shortness of Breath. Past medical history:    has a past medical history of Diabetes mellitus ([address] Past surgical history: none  Social History: no smoking   reports that he does not drink alcohol or use drugs. Family history: none    No Known Allergies      Objective:   BP (!) 100/53   Pulse 65   Temp 97.7 °F (36.5 °C) (Rectal)   Resp 16   Ht 6' 0.01\" (1.829 m)   Wt (!) 341 lb 14.9 oz (155.1 kg)   SpO2 92%   BMI 46.36 kg/m²       Intake/Output Summary (Last 24 hours) at 1/15/2021 1222  Last data filed at 1/15/2021 0945  Gross per 24 hour   Intake 4029.49 ml   Output 1930 ml   Net 2099.49 ml       TELEMETRY:afib     Physical Exam:  Constitutional:  Well developed, Well nourished, No acute distress, Non-toxic appearance. HENT:  Normocephalic, Atraumatic, Bilateral external ears normal, Oropharynx moist, No oral exudates, Nose normal. Neck- Normal range of motion, No tenderness, Supple, No stridor. Eyes:  PERRL, EOMI, Conjunctiva normal, No discharge. Respiratory:  Normal breath sounds, No respiratory distress, No wheezing, No chest tenderness. ,no use of accessory muscles, diaphragm movement is normal  Cardiovascular: (PMI) apex non displaced,no lifts no thrills, no s3,no s4, Normal heart rate, Normal rhythm, No murmurs, No rubs, No gallops.  Carotid arteries pulse and amplitude are normal no bruit, no abdominal bruit noted ( normal abdominal aorta ausculation), femoral arteries pulse and amplitude are normal no bruit, pedal pulses are normal  GI:  Bowel sounds normal, Soft, No tenderness, No masses, No pulsatile masses. : External genitalia appear normal, No masses or lesions. No discharge. No CVA tenderness. Musculoskeletal:  Intact distal pulses, No edema, No tenderness, No cyanosis, No clubbing. Good range of motion in all major joints. No tenderness to palpation or major deformities noted. Back- No tenderness. Integument:  Warm, Dry, No erythema, No rash. Lymphatic:  No lymphadenopathy noted.    Neurologic: intubated    Medications:    insulin lispro  24 Units Subcutaneous Once    insulin glargine  50 Units Subcutaneous Nightly    insulin lispro  0-24 Units Subcutaneous Q4H    [START ON 1/16/2021] insulin NPH  40 Units Subcutaneous QAM    albuterol sulfate HFA  4 puff Inhalation Q4H    And    ipratropium  4 puff Inhalation Q4H    chlorhexidine  15 mL Mouth/Throat BID    famotidine (PEPCID) injection  20 mg Intravenous BID    miconazole   Topical BID    cefepime  2 g Intravenous Q12H    vancomycin  1,750 mg Intravenous Q24H    apixaban  5 mg Oral BID    sodium chloride flush  10 mL Intravenous 2 times per day    sodium chloride flush  10 mL Intravenous 2 times per day    dexamethasone  6 mg Oral Daily    [Held by provider] amLODIPine  10 mg Oral Daily    atorvastatin  10 mg Oral Daily    [Held by provider] chlorthalidone  25 mg Oral Daily    ezetimibe  10 mg Oral Daily    fenofibrate  160 mg Oral Daily    folic acid  1 mg Oral Daily    levothyroxine  75 mcg Oral Daily    mesalamine  800 mg Oral TID    [Held by provider] losartan  100 mg Oral Daily    pantoprazole  40 mg Oral QAM AC    aspirin  81 mg Oral Daily      norepinephrine 5 mcg/min (01/15/21 0031)    fentanyl 100 mcg/hr (01/15/21 1130)    propofol 60 mcg/kg/min (01/15/21 1151)    dextrose      DOPamine 6 mcg/kg/min (01/15/21 0615)     polyvinyl alcohol **AND** artificial tears, sodium chloride flush, glucose, dextrose, glucagon (rDNA), dextrose, albuterol sulfate HFA, sodium chloride flush, promethazine **OR** ondansetron, polyethylene glycol, acetaminophen **OR** acetaminophen, guaiFENesin-dextromethorphan, diazePAM, sodium chloride    Lab Data:  CBC:   Recent Labs     01/13/21 0441 01/14/21  0510 01/15/21  0555   WBC 6.8 8.1 8.9   HGB 12.5* 11.8* 9.0*   HCT 37.2* 35.4* 27.1*   MCV 92.5 93.2 94.1    291 245     BMP:   Recent Labs     01/13/21  0441 01/14/21  0510    135   K 4.7 4.4   CL 97* 96*   CO2 28 27   BUN 39* 46*   CREATININE 1.1 1.3     LIVER PROFILE:   Recent Labs     01/13/21 0441 01/14/21  0510   AST 37 29   ALT 29 24   BILITOT 0.8 1.2*   ALKPHOS 33* 34*     PT/INR:   No results for input(s): PROTIME, INR in the last 72 hours. APTT: No results for input(s): APTT in the last 72 hours. BNP:  No results for input(s): BNP in the last 72 hours. TROPONIN: @TROPONINI:3@      Assessment:  67 y. o.year old who is admitted for          Plan:  1. Bradycardia with afib: not a junctional escape, will CONTINUE dopamine drip for now,echo IS NORMAL, ok to use anticoagulation  2. Respiratory failure: s/p intubation  3. Hypotension and shock: continue dopamine drip  4. COVID 19: contineu treatment as per protocol  5. Pneumonia: as per protocol  6. DM: had hypoglycemia, hold insulin and watch for hypoglycemiaHealth maintenance: exerise and diet  7. All labs, medications and tests reviewed, continue all other medications of all above medical condition listed as is.       Kuldip Garcia MD 1/15/2021 12:22 PM

## 2021-01-15 NOTE — CONSULTS
Comprehensive Nutrition Assessment    Type and Reason for Visit:  Consult    Nutrition Recommendations/Plan:  EN Order: Vital 1.2 AF @ 20 ml/hr  Will closely monitor GI tolerance  Will monitor nutrition status, poc    Nutrition Assessment:  pt remains sedated on vent, +2 pressors, MAP varied, +NGT, dietitian consult for tube feed order and manage, spoke with RN who states physician wanting EN initiated, pt at high nutrition risk    Malnutrition Assessment:  Malnutrition Status:  Insufficient data    Context:  Acute Illness       Estimated Daily Nutrient Needs:  Energy (kcal):  4486-1187(hypocaloric feeds 11-14 kcal/kg); Weight Used for Energy Requirements:  Current     Protein (g):  162(2.0 g/kg);  Weight Used for Protein Requirements:  Ideal          Wounds:  None       Current Nutrition Therapies:    Diet NPO Effective Now  Additional Calorie Sources:   pt is receiving ~1473 kcal from current propofol rate    Anthropometric Measures:  · Height: 5' 11.65\" (182 cm)  · Current Body Weight: 341 lb 14.9 oz (155.1 kg)   · Admission Body Weight: 341 lb 14.9 oz (155.1 kg)(unknown weight source)    · Usual Body Weight: (n/a)     · Ideal Body Weight: 176 lbs; % Ideal Body Weight 194.3 %   · BMI: 46.8  · BMI Categories: Obese Class 3 (BMI 40.0 or greater)       Nutrition Diagnosis:   · Inadequate oral intake related to impaired respiratory function as evidenced by NPO or clear liquid status due to medical condition    Nutrition Interventions:   Food and/or Nutrient Delivery:  Start Tube Feeding(trophic feeds)  Nutrition Education/Counseling:  No recommendation at this time   Coordination of Nutrition Care:  Continue to monitor while inpatient    Goals:  pt will tolerate EN initiation within the next 24 hr       Nutrition Monitoring and Evaluation:   Behavioral-Environmental Outcomes:  None Identified   Food/Nutrient Intake Outcomes:  Enteral Nutrition Intake/Tolerance  Physical Signs/Symptoms Outcomes:  Biochemical Data, Weight, GI Status, Hemodynamic Status, Fluid Status or Edema     Discharge Planning:     Too soon to determine     Electronically signed by Naye Jacobs MS, RD, LD on 1/15/21 at 3:20 PM EST    Contact: [phone]

## 2021-01-16 NOTE — PROGRESS NOTES
Progress Note( Dr. Yaritza Pfeiffer)    Subjective:   Admit Date: 1/8/2021  PCP: Johnathon Carroll MD    I saw the patient on 1/14/2021 but made the note later    Admitted For :Hypoxia, shortness of breath, Covid pneumonia    Consulted For: Better control of blood glucose    Interval History: Patient is respiratory status got worse was sedated intubated and placed on ventilator on 1/13/2021  running very high blood glucose  . Delsa Severance Intake/Output Summary (Last 24 hours) at 1/15/2021 2023  Last data filed at 1/15/2021 1805  Gross per 24 hour   Intake 3303.86 ml   Output 1815 ml   Net 1488.86 ml       DATA    CBC:   Recent Labs     01/13/21  0441 01/14/21  0510 01/15/21  0555   WBC 6.8 8.1 8.9   HGB 12.5* 11.8* 9.0*    291 245    CMP:  Recent Labs     01/13/21  0441 01/14/21  0510    135   K 4.7 4.4   CL 97* 96*   CO2 28 27   BUN 39* 46*   CREATININE 1.1 1.3   CALCIUM 8.4 8.0*   PROT 6.2* 5.6*   LABALBU 3.3* 2.8*   BILITOT 0.8 1.2*   ALKPHOS 33* 34*   AST 37 29   ALT 29 24     Lipids:   Lab Results   Component Value Date    CHOL 115 10/29/2012    HDL 39 10/29/2012    TRIG 80 10/29/2012     Glucose:  Recent Labs     01/15/21  0756 01/15/21  1155 01/15/21  1713   POCGLU 402* 315* 538*     [id number]:No results found for: LABA1C  High Sensitivity TSH:   Lab Results   Component Value Date    TSHHS 2.320 10/29/2012     Free T3: No results found for: FT3  Free T4:  Lab Results   Component Value Date    T4FREE 0.89 10/29/2012       Xr Chest Portable    Result Date: 1/8/2021  EXAMINATION: ONE XRAY VIEW OF THE CHEST 1/8/2021 6:12 pm COMPARISON: 02/17/2014 radiograph HISTORY: ORDERING SYSTEM PROVIDED HISTORY: sob \       No acute finding in the chest.     Ct Chest Pulmonary Embolism W Contrast    Result Date: 1/9/2021  EXAMINATION: CTA OF THE CHEST 1/9/2021 4:02 pm       1. No pulmonary embolism. 2. Pulmonary parenchymal findings typical of mild COVID-19 pneumonia.   Note that CT pulmonary findings of COVID-19 show overlap with other disease entities including H1N1 influenza, other viral pneumonia (i.e. adenovirus, CMV), organizing pneumonia, alveolar proteinosis and acute interstitial pneumonitis. 3. Trace bilateral pleural effusion. 4. Emphysema. 5. Calcific atherosclerosis aorta and coronary arteries. 6. Mild mediastinal and right hilar lymph node enlargement is almost certainly reactive. This should be followed with IV contrast-enhanced CT chest in 3 months to ensure stability.        Scheduled Medicines   Medications:    insulin lispro  24 Units Subcutaneous Once    insulin glargine  50 Units Subcutaneous Nightly    [START ON 1/16/2021] insulin NPH  40 Units Subcutaneous QAM    albuterol sulfate HFA  4 puff Inhalation Q4H    And    ipratropium  4 puff Inhalation Q4H    insulin lispro  0-36 Units Subcutaneous Q4H    chlorhexidine  15 mL Mouth/Throat BID    famotidine (PEPCID) injection  20 mg Intravenous BID    miconazole   Topical BID    cefepime  2 g Intravenous Q12H    vancomycin  1,750 mg Intravenous Q24H    apixaban  5 mg Oral BID    sodium chloride flush  10 mL Intravenous 2 times per day    sodium chloride flush  10 mL Intravenous 2 times per day    dexamethasone  6 mg Oral Daily    [Held by provider] amLODIPine  10 mg Oral Daily    atorvastatin  10 mg Oral Daily    [Held by provider] chlorthalidone  25 mg Oral Daily    ezetimibe  10 mg Oral Daily    fenofibrate  160 mg Oral Daily    folic acid  1 mg Oral Daily    levothyroxine  75 mcg Oral Daily    mesalamine  800 mg Oral TID    [Held by provider] losartan  100 mg Oral Daily    pantoprazole  40 mg Oral QAM AC    aspirin  81 mg Oral Daily      Infusions:    norepinephrine 3 mcg/min (01/15/21 1511)    fentanyl 100 mcg/hr (01/15/21 1130)    propofol 59.961 mcg/kg/min (01/15/21 1746)    dextrose      DOPamine 8 mcg/kg/min (01/15/21 1502)         Objective:   Vitals: BP (!) 130/56   Pulse 60   Temp 98.4 °F (36.9 °C) (Axillary)   Resp 15   Ht

## 2021-01-16 NOTE — PROGRESS NOTES
Progress Note( Dr. Janina Moore)  1/15/2021  Subjective:   Admit Date: 1/8/2021  PCP: Gema Metcalf MD    Admitted For :Hypoxia, shortness of breath, Covid pneumonia    Consulted For: Better control of blood glucose    Interval History: Patient is respiratory status got worse was sedated intubated and placed on ventilator on 1/13/2021  patient's blood glucose running high in over 400 mg though he is not on tube feeding yet      Intake/Output Summary (Last 24 hours) at 1/15/2021 2019  Last data filed at 1/15/2021 1805  Gross per 24 hour   Intake 3303.86 ml   Output 1815 ml   Net 1488.86 ml       DATA    CBC:   Recent Labs     01/13/21  0441 01/14/21  0510 01/15/21  0555   WBC 6.8 8.1 8.9   HGB 12.5* 11.8* 9.0*    291 245    CMP:  Recent Labs     01/13/21  0441 01/14/21  0510    135   K 4.7 4.4   CL 97* 96*   CO2 28 27   BUN 39* 46*   CREATININE 1.1 1.3   CALCIUM 8.4 8.0*   PROT 6.2* 5.6*   LABALBU 3.3* 2.8*   BILITOT 0.8 1.2*   ALKPHOS 33* 34*   AST 37 29   ALT 29 24     Lipids:   Lab Results   Component Value Date    CHOL 115 10/29/2012    HDL 39 10/29/2012    TRIG 80 10/29/2012     Glucose:  Recent Labs     01/15/21  0756 01/15/21  1155 01/15/21  1713   POCGLU 402* 315* 538*     [id number]:No results found for: LABA1C  High Sensitivity TSH:   Lab Results   Component Value Date    TSHHS 2.320 10/29/2012     Free T3: No results found for: FT3  Free T4:  Lab Results   Component Value Date    T4FREE 0.89 10/29/2012       Xr Chest Portable    Result Date: 1/8/2021  EXAMINATION: ONE XRAY VIEW OF THE CHEST 1/8/2021 6:12 pm COMPARISON: 02/17/2014 radiograph HISTORY: ORDERING SYSTEM PROVIDED HISTORY: sob \       No acute finding in the chest.     Ct Chest Pulmonary Embolism W Contrast    Result Date: 1/9/2021  EXAMINATION: CTA OF THE CHEST 1/9/2021 4:02 pm       1. No pulmonary embolism. 2. Pulmonary parenchymal findings typical of mild COVID-19 pneumonia.   Note that CT pulmonary findings of COVID-19 show overlap with other disease entities including H1N1 influenza, other viral pneumonia (i.e. adenovirus, CMV), organizing pneumonia, alveolar proteinosis and acute interstitial pneumonitis. 3. Trace bilateral pleural effusion. 4. Emphysema. 5. Calcific atherosclerosis aorta and coronary arteries. 6. Mild mediastinal and right hilar lymph node enlargement is almost certainly reactive. This should be followed with IV contrast-enhanced CT chest in 3 months to ensure stability.        Scheduled Medicines   Medications:    insulin lispro  24 Units Subcutaneous Once    insulin glargine  50 Units Subcutaneous Nightly    [START ON 1/16/2021] insulin NPH  40 Units Subcutaneous QAM    albuterol sulfate HFA  4 puff Inhalation Q4H    And    ipratropium  4 puff Inhalation Q4H    insulin lispro  0-36 Units Subcutaneous Q4H    chlorhexidine  15 mL Mouth/Throat BID    famotidine (PEPCID) injection  20 mg Intravenous BID    miconazole   Topical BID    cefepime  2 g Intravenous Q12H    vancomycin  1,750 mg Intravenous Q24H    apixaban  5 mg Oral BID    sodium chloride flush  10 mL Intravenous 2 times per day    sodium chloride flush  10 mL Intravenous 2 times per day    dexamethasone  6 mg Oral Daily    [Held by provider] amLODIPine  10 mg Oral Daily    atorvastatin  10 mg Oral Daily    [Held by provider] chlorthalidone  25 mg Oral Daily    ezetimibe  10 mg Oral Daily    fenofibrate  160 mg Oral Daily    folic acid  1 mg Oral Daily    levothyroxine  75 mcg Oral Daily    mesalamine  800 mg Oral TID    [Held by provider] losartan  100 mg Oral Daily    pantoprazole  40 mg Oral QAM AC    aspirin  81 mg Oral Daily      Infusions:    norepinephrine 3 mcg/min (01/15/21 1511)    fentanyl 100 mcg/hr (01/15/21 1130)    propofol 59.961 mcg/kg/min (01/15/21 1746)    dextrose      DOPamine 8 mcg/kg/min (01/15/21 1502)         Objective:   Vitals: BP (!) 130/56   Pulse 60   Temp 98.4 °F (36.9 °C) (Axillary)   Resp 15   Ht 5' 11.65\" (1.82 m)   Wt (!) 341 lb 14.9 oz (155.1 kg)   SpO2 92%   BMI 46.82 kg/m²   General appearance: Sedated, intubated and on ventilator   neck: no JVD or bruit  Thyroid : Normal lobes   Lungs: Has Vesicular Breath sounds has some wheezing and some rales intubated and on ventilator  Heart:  regular rate and rhythm  Abdomen: soft, non-tender; bowel sounds normal; no masses,  no organomegaly  Musculoskeletal: Normal  Extremities: extremities normal, , no edema  Neurologic: Sedated, intubated and on ventilator    Assessment:     Patient Active Problem List:     COVID-19       Pneumonia        Diabetes mellitus        Hypertension        Hyperlipidemia        Obstructive sleep apnea        Obesity      Plan:     1. Reviewed POC blood glucose . Labs and X ray results   2. Reviewed Current Medicines   3. On  Correction bolus Humalog/ Basal Lantus Insulin regime and also NPH insulin  4. Monitor Blood glucose frequently   5. Modified  the dose of Insulin/ other medicines as needed   6. Will follow     .      Rosie Cheek MD

## 2021-01-16 NOTE — PROGRESS NOTES
Progress Note( Dr. Mark Nicholson)  1/16/2021  Subjective:   Admit Date: 1/8/2021  PCP: Fely Murry MD    Admitted For :Hypoxia, shortness of breath, Covid pneumonia    Consulted For: Better control of blood glucose    Interval History: Patient is respiratory status got worse was sedated intubated and placed on ventilator on 1/13/2021  patient's blood glucose are better       Intake/Output Summary (Last 24 hours) at 1/16/2021 1507  Last data filed at 1/16/2021 0807  Gross per 24 hour   Intake 3546.33 ml   Output 1290 ml   Net 2256.33 ml       DATA    CBC:   Recent Labs     01/14/21  0510 01/15/21  0555 01/16/21  0644   WBC 8.1 8.9 10.2   HGB 11.8* 9.0* 10.8*    245 288    CMP:  Recent Labs     01/14/21  0510      K 4.4   CL 96*   CO2 27   BUN 46*   CREATININE 1.3   CALCIUM 8.0*   PROT 5.6*   LABALBU 2.8*   BILITOT 1.2*   ALKPHOS 34*   AST 29   ALT 24     Lipids:   Lab Results   Component Value Date    CHOL 115 10/29/2012    HDL 39 10/29/2012    TRIG 80 10/29/2012     Glucose:  Recent Labs     01/16/21  0647 01/16/21  0804 01/16/21  1157   POCGLU 223* 220* 242*     [id number]:No results found for: LABA1C  High Sensitivity TSH:   Lab Results   Component Value Date    TSHHS 2.320 10/29/2012     Free T3: No results found for: FT3  Free T4:  Lab Results   Component Value Date    T4FREE 0.89 10/29/2012       Xr Chest Portable    Result Date: 1/8/2021  EXAMINATION: ONE XRAY VIEW OF THE CHEST 1/8/2021 6:12 pm COMPARISON: 02/17/2014 radiograph HISTORY: ORDERING SYSTEM PROVIDED HISTORY: sob \       No acute finding in the chest.     Ct Chest Pulmonary Embolism W Contrast    Result Date: 1/9/2021  EXAMINATION: CTA OF THE CHEST 1/9/2021 4:02 pm       1. No pulmonary embolism. 2. Pulmonary parenchymal findings typical of mild COVID-19 pneumonia.   Note that CT pulmonary findings of COVID-19 show overlap with other disease entities including H1N1 influenza, other viral pneumonia (i.e. adenovirus, CMV), organizing pneumonia, alveolar proteinosis and acute interstitial pneumonitis. 3. Trace bilateral pleural effusion. 4. Emphysema. 5. Calcific atherosclerosis aorta and coronary arteries. 6. Mild mediastinal and right hilar lymph node enlargement is almost certainly reactive. This should be followed with IV contrast-enhanced CT chest in 3 months to ensure stability.        Scheduled Medicines   Medications:    [START ON 1/17/2021] insulin NPH  30 Units Subcutaneous QAM    insulin glargine  50 Units Subcutaneous Nightly    albuterol sulfate HFA  4 puff Inhalation Q4H    And    ipratropium  4 puff Inhalation Q4H    insulin lispro  0-36 Units Subcutaneous Q4H    chlorhexidine  15 mL Mouth/Throat BID    famotidine (PEPCID) injection  20 mg Intravenous BID    miconazole   Topical BID    cefepime  2 g Intravenous Q12H    vancomycin  1,750 mg Intravenous Q24H    apixaban  5 mg Oral BID    sodium chloride flush  10 mL Intravenous 2 times per day    sodium chloride flush  10 mL Intravenous 2 times per day    dexamethasone  6 mg Oral Daily    [Held by provider] amLODIPine  10 mg Oral Daily    atorvastatin  10 mg Oral Daily    [Held by provider] chlorthalidone  25 mg Oral Daily    ezetimibe  10 mg Oral Daily    fenofibrate  160 mg Oral Daily    folic acid  1 mg Oral Daily    levothyroxine  75 mcg Oral Daily    mesalamine  800 mg Oral TID    [Held by provider] losartan  100 mg Oral Daily    pantoprazole  40 mg Oral QAM AC    aspirin  81 mg Oral Daily      Infusions:    norepinephrine 2 mcg/min (01/15/21 2138)    fentanyl 100 mcg/hr (01/16/21 0951)    propofol 60 mcg/kg/min (01/16/21 1418)    dextrose      DOPamine 8 mcg/kg/min (01/16/21 0951)         Objective:   Vitals: BP (!) 129/56   Pulse 58   Temp 97 °F (36.1 °C) (Rectal)   Resp 15   Ht 5' 11.65\" (1.82 m)   Wt (!) 341 lb 14.9 oz (155.1 kg)   SpO2 90%   BMI 46.82 kg/m²   General appearance: Sedated, intubated and on ventilator   neck: no JVD or bruit  Thyroid : Normal lobes   Lungs: Has Vesicular Breath sounds has some wheezing and some rales intubated and on ventilator  Heart:  regular rate and rhythm  Abdomen: soft, non-tender; bowel sounds normal; no masses,  no organomegaly  Musculoskeletal: Normal  Extremities: extremities normal, , no edema  Neurologic: Sedated, intubated and on ventilator    Assessment:     Patient Active Problem List:     COVID-19       Pneumonia        Diabetes mellitus        Hypertension        Hyperlipidemia        Obstructive sleep apnea        Obesity      Plan:     1. Reviewed POC blood glucose . Labs and X ray results   2. Reviewed Current Medicines   3. On  Correction bolus Humalog/ Basal Lantus Insulin regime and also NPH insulin  4. Monitor Blood glucose frequently   5. Modified  the dose of Insulin/ other medicines as needed   6. Will follow     .      Alcon Pepe MD

## 2021-01-16 NOTE — PROGRESS NOTES
Nicole alonzo son called and updated on days progress. Questions answered. Pt requesting to NOT be called [phone] for update.

## 2021-01-16 NOTE — PROGRESS NOTES
pulmonary      SUBJECTIVE: intubated on vent     OBJECTIVE    VITALS:  BP (!) 129/56   Pulse 58   Temp 97 °F (36.1 °C) (Rectal)   Resp 15   Ht 5' 11.65\" (1.82 m)   Wt (!) 341 lb 14.9 oz (155.1 kg)   SpO2 90%   BMI 46.82 kg/m²   HEAD AND FACE EXAM:  No throat injection, no active exudate,no thrush  NECK EXAM;No JVD, no masses, symmetrical  CHEST EXAM; Expansion equal and symmetrical, no masses  LUNG EXAM; Good breath sounds bilaterally.  There are expiratory wheezes both lungs, there are crackles at both lung bases  CARDIOVASCULAR EXAM: Positive S1 and S2, no S3 or S4, no clicks ,no murmurs  RIGHT AND LEFT LOWER EXTRIMITY EXAM: No edema, no swelling, no inflamation            LABS   Lab Results   Component Value Date    WBC 10.2 01/16/2021    HGB 10.8 (L) 01/16/2021    HCT 32.7 (L) 01/16/2021    MCV 95.9 01/16/2021     01/16/2021     Lab Results   Component Value Date    CREATININE 1.3 01/14/2021    BUN 46 (H) 01/14/2021     01/14/2021    K 4.4 01/14/2021    CL 96 (L) 01/14/2021    CO2 27 01/14/2021     Lab Results   Component Value Date    INR 1.02 01/08/2021    PROTIME 13.3 01/08/2021        No results found for: PHOS     Recent Labs     01/14/21  0600 01/15/21  0600 01/16/21  0600   PH 7.38 7.31* 7.35   PO2ART 58* 113* 74*   JAW3WRF 50.0* 54.0* 46.0*   O2SAT 88.8* 96.2 93.9*         Wt Readings from Last 3 Encounters:   01/10/21 (!) 341 lb 14.9 oz (155.1 kg)        EXAMINATION:   ONE XRAY VIEW OF THE CHEST       1/16/2021 5:43 am       COMPARISON:   01/15/2021 4:50 a.m.       HISTORY:   ORDERING SYSTEM PROVIDED HISTORY: ventilator   TECHNOLOGIST PROVIDED HISTORY:   Reason for exam:->ventilator   Reason for Exam: vent   Acuity: Unknown   Type of Exam: Unknown       FINDINGS:   Monitor wires project over the chest.  Both costophrenic angles are excluded   from the field of view.  Heart size is enlarged, stable.  Status post median   sternotomy.  Right PICC tip at the cavoatrial junction.  Enteric catheter   present.  Bilateral interstitial and airspace opacities are stable.           Impression   Stable interstitial and airspace opacities consistent with   multifocal/atypical pneumonia.           Order History    Open Order Details   PACS Images     Show images for XR CHEST PORTABLE   Results History Report    View Report               ASSESMENT  Ac resp failure  covid pneumonia  abraham pneumonia        PLAN  1. cpm  2. Cont full vent support  3.  Tube feed    1/16/2021  Amber Torres M.D.

## 2021-01-16 NOTE — PROGRESS NOTES
[address] [facility]  consulted by Dr. Ar Connell for monitoring and adjustment. Indication for treatment: Pneumonia  Goal trough: 15 mcg/mL, AUC/LOGAN 400-600    Pertinent Laboratory Values:   Temp Readings from Last 3 Encounters:   01/16/21 98.7 °F (37.1 °C) (Rectal)     Recent Labs     01/14/21  0510 01/15/21  0555 01/16/21  0644   WBC 8.1 8.9 10.2     Recent Labs     01/14/21  0510   BUN 46*   CREATININE 1.3     Estimated Creatinine Clearance: 74 mL/min (based on SCr of 1.3 mg/dL). Intake/Output Summary (Last 24 hours) at 1/16/2021 1809  Last data filed at 1/16/2021 1804  Gross per 24 hour   Intake 3416.36 ml   Output 2585 ml   Net 831.36 ml       Pertinent Cultures:  Date    Source    Results  1/8/21   Covid-19   Positive  1/8/21   Blood    NGTD  1/11/21  MRSA nasal   NGTD  1/13/21  Respiratory   Pending    Vancomycin level:   TROUGH:    Recent Labs     01/14/21  1030   VANCOTROUGH 9.7*     RANDOM:  No results for input(s): VANCORANDOM in the last 72 hours. Assessment:  · WBC and temperature: WBC WNL; pt afebrile (Tmax = 100.9F)  · SCr, BUN, and urine output: SCR up slightly to 1.3, UOP good  · Day(s) of therapy: # 6  · Vancomycin concentration:  · 1/14: 9.7, collected after 3 doses, slightly below goal    Plan:  · SCR with a slight upward trend, SCR ordered for tomorrow. · CONTINUE SAME DOSE AND FREQUENCY = Vancomycin 1750 mg IVPB q24h.   · Trough level: sub-therapeutic @ 9.7 after x3 doses, anticipate level closer to goal when steady-state is reached  · BUN is trending up and given advanced age, will not increase dose at this time  · Calculated AUC/LOGAN at steady-state: 471, therapeutic  · MRSA nasal screen is negative, can consider de-escalation for indication of MRSA pneumonia if appropriate per clinical course  · Repeat level ordered for tomorrow am  · Pharmacy will continue to monitor patient and adjust therapy as indicated    VANCOMYCIN CONCENTRATION SCHEDULED FOR 1/17 @

## 2021-01-17 NOTE — PROGRESS NOTES
Progress Note( Dr. Marbella Kruse)  1/17/2021  Subjective:   Admit Date: 1/8/2021  PCP: Jose Cloud MD    Admitted For :Hypoxia, shortness of breath, Covid pneumonia    Consulted For: Better control of blood glucose    Interval History: Patient is respiratory status got worse was sedated intubated and placed on ventilator on 1/13/2021  patient's blood glucose are better       Intake/Output Summary (Last 24 hours) at 1/17/2021 1448  Last data filed at 1/17/2021 1230  Gross per 24 hour   Intake 3277 ml   Output 3155 ml   Net 122 ml       DATA    CBC:   Recent Labs     01/15/21  0555 01/16/21  0644 01/17/21  0540   WBC 8.9 10.2 10.3   HGB 9.0* 10.8* 10.7*    288 321    CMP:  Recent Labs     01/17/21  0540   CREATININE 2.0*     Lipids:   Lab Results   Component Value Date    CHOL 115 10/29/2012    HDL 39 10/29/2012    TRIG 80 10/29/2012     Glucose:  Recent Labs     01/17/21  0452 01/17/21  0756 01/17/21  1203   POCGLU 177* 141* 194*     [id number]:No results found for: LABA1C  High Sensitivity TSH:   Lab Results   Component Value Date    TSHHS 2.320 10/29/2012     Free T3: No results found for: FT3  Free T4:  Lab Results   Component Value Date    T4FREE 0.89 10/29/2012       Xr Chest Portable    Result Date: 1/8/2021  EXAMINATION: ONE XRAY VIEW OF THE CHEST 1/8/2021 6:12 pm COMPARISON: 02/17/2014 radiograph HISTORY: ORDERING SYSTEM PROVIDED HISTORY: sob \       No acute finding in the chest.     Ct Chest Pulmonary Embolism W Contrast    Result Date: 1/9/2021  EXAMINATION: CTA OF THE CHEST 1/9/2021 4:02 pm       1. No pulmonary embolism. 2. Pulmonary parenchymal findings typical of mild COVID-19 pneumonia. Note that CT pulmonary findings of COVID-19 show overlap with other disease entities including H1N1 influenza, other viral pneumonia (i.e. adenovirus, CMV), organizing pneumonia, alveolar proteinosis and acute interstitial pneumonitis. 3. Trace bilateral pleural effusion. 4. Emphysema.  5. Calcific atherosclerosis aorta and coronary arteries. 6. Mild mediastinal and right hilar lymph node enlargement is almost certainly reactive. This should be followed with IV contrast-enhanced CT chest in 3 months to ensure stability.        Scheduled Medicines   Medications:    [START ON 1/18/2021] vancomycin (VANCOCIN) intermittent dosing (placeholder)   Other RX Placeholder    metoclopramide  10 mg Intravenous TID    enoxaparin  40 mg Subcutaneous Daily    insulin NPH  30 Units Subcutaneous QAM    insulin glargine  50 Units Subcutaneous Nightly    albuterol sulfate HFA  4 puff Inhalation Q4H    And    ipratropium  4 puff Inhalation Q4H    insulin lispro  0-36 Units Subcutaneous Q4H    chlorhexidine  15 mL Mouth/Throat BID    famotidine (PEPCID) injection  20 mg Intravenous BID    miconazole   Topical BID    cefepime  2 g Intravenous Q12H    [Held by provider] apixaban  5 mg Oral BID    sodium chloride flush  10 mL Intravenous 2 times per day    sodium chloride flush  10 mL Intravenous 2 times per day    dexamethasone  6 mg Oral Daily    [Held by provider] amLODIPine  10 mg Oral Daily    [Held by provider] atorvastatin  10 mg Oral Daily    [Held by provider] chlorthalidone  25 mg Oral Daily    [Held by provider] ezetimibe  10 mg Oral Daily    fenofibrate  160 mg Oral Daily    [Held by provider] folic acid  1 mg Oral Daily    levothyroxine  75 mcg Oral Daily    [Held by provider] mesalamine  800 mg Oral TID    [Held by provider] losartan  100 mg Oral Daily    [Held by provider] pantoprazole  40 mg Oral QAM AC    aspirin  81 mg Oral Daily      Infusions:    norepinephrine Stopped (01/16/21 0500)    fentanyl 100 mcg/hr (01/17/21 0718)    propofol 30 mcg/kg/min (01/17/21 1202)    dextrose      DOPamine 8 mcg/kg/min (01/17/21 1050)         Objective:   Vitals: BP (!) 117/56   Pulse 58   Temp 99.1 °F (37.3 °C)   Resp 16   Ht 5' 11.65\" (1.82 m)   Wt (!) 341 lb 14.9 oz (155.1 kg)   SpO2 92%   BMI 46.82 kg/m² General appearance: Sedated, intubated and on ventilator   neck: no JVD or bruit  Thyroid : Normal lobes   Lungs: Has Vesicular Breath sounds has some wheezing and some rales intubated and on ventilator  Heart:  regular rate and rhythm  Abdomen: soft, non-tender; bowel sounds normal; no masses,  no organomegaly  Musculoskeletal: Normal  Extremities: extremities normal, , no edema  Neurologic: Sedated, intubated and on ventilator    Assessment:     Patient Active Problem List:     COVID-19       Pneumonia        Diabetes mellitus        Hypertension        Hyperlipidemia        Obstructive sleep apnea        Obesity      Plan:     1. Reviewed POC blood glucose . Labs and X ray results   2. Reviewed Current Medicines   3. On  Correction bolus Humalog/ Basal Lantus Insulin regime and also NPH insulin  4. Monitor Blood glucose frequently   5. Modified  the dose of Insulin/ other medicines as needed   6. Will follow     .      Viridiana Pritchett MD

## 2021-01-17 NOTE — PROGRESS NOTES
[address] [facility]  consulted by Dr. Danetta Nageotte for monitoring and adjustment. Indication for treatment: Pneumonia  Goal trough: 15 mcg/mL, AUC/LOGAN 400-600    Pertinent Laboratory Values:   Temp Readings from Last 3 Encounters:   01/17/21 100.3 °F (37.9 °C) (Rectal)     Recent Labs     01/15/21  0555 01/16/21  0644 01/17/21  0540   WBC 8.9 10.2 10.3     Recent Labs     01/17/21  0540   CREATININE 2.0*     Estimated Creatinine Clearance: 48 mL/min (A) (based on SCr of 2 mg/dL (H)). Intake/Output Summary (Last 24 hours) at 1/17/2021 1138  Last data filed at 1/17/2021 0727  Gross per 24 hour   Intake 3217 ml   Output 2880 ml   Net 337 ml       Pertinent Cultures:  Date    Source    Results  1/8/21   Covid-19   Positive  1/8/21   Blood    NGTD  1/11/21  MRSA nasal   NGTD  1/13/21  Respiratory   Pending    Vancomycin level:   TROUGH:    No results for input(s): VANCOTROUGH in the last 72 hours. RANDOM:  No results for input(s): VANCORANDOM in the last 72 hours. Assessment:  · WBC and temperature: WBC remains normal; Pt now with a fever of 100.3 (Tmax = 100.9F)  · SCr, BUN, and urine output: SCR jumped to 2 today, UOP appears ok  · Day(s) of therapy: #7  · Vancomycin concentration:  · 1/14: 9.7, collected after 3 doses, slightly below goal    Plan:  · Vanco level not done this morning, 1,750mg dose given today @0938  · SCR jumped to 2 today, will hold future vanco doses for now. · Due to renal trends, intermittent vanco dosing based on levels and renal trends is now appropriate. · Check the vanco random level tomorrow am  · MRSA nasal screen is negative, can consider de-escalation for indication of MRSA pneumonia if appropriate per clinical course  · Pharmacy will continue to monitor patient and adjust therapy as indicated    Vinh 3 1/18 @ 06:00    Thank you for the consult. John Melara   1/17/2021 11:38 AM

## 2021-01-17 NOTE — PROGRESS NOTES
01/17/21 0456   Vent Information   Vent Type 980   Vent Mode AC/VC+   Vt Ordered 550 mL   Rate Set 16 bmp   Peak Flow 0 L/min   Pressure Support 0 cmH20   FiO2  40 %   Sensitivity 3   PEEP/CPAP 9   I Time/ I Time % 1 s   Humidification Source HME   Circuit Condensation Drained   Nitric Oxide/Epoprostenol In Use? No   Vent Patient Data   High Peep/I Pressure 0   Peak Inspiratory Pressure 24 cmH2O   Mean Airway Pressure 14 cmH20   Rate Measured 17 br/min   Vt Exhaled 862 mL   Minute Volume 7.09 Liters   I:E Ratio 1:2.60   Cough/Sputum   Sputum How Obtained Suctioned;Endotracheal   Cough Non-productive;Dry   Sputum Amount None   Sputum Color None   Breath Sounds   Right Upper Lobe Diminished   Right Middle Lobe Diminished   Right Lower Lobe Diminished   Left Upper Lobe Diminished   Left Lower Lobe Diminished   Additional Respiratory  Assessments   Position Prone   Oral Care Completed? No   Alarm Settings   High Pressure Alarm 50 cmH2O   Delay Alarm 20 sec(s)   Low Minute Volume Alarm 2.5 L/min   Apnea (secs) 20 secs   High Respiratory Rate 40 br/min   Low Exhaled Vt  250 mL   Patient Observation   Observations Pt is prone   ETT (adult)   Placement Date/Time: 01/13/21 (c) 4432   Preoxygenation: Yes  Mask Ventilation: Ventilated by mask (1)  Technique: Video laryngoscopy  Tube Size: 7.5 mm  Laryngoscope: Mac  Blade Size: 4  Grade View: Full view of the glottis  Insertion attempts: 1  Pl. ..    Secured at 26 cm   Measured From Lips   ET Placement Left   Secured By Commercial tube [email or web address]   Site Condition Dry

## 2021-01-17 NOTE — PROGRESS NOTES
Hospitalist Progress Note      Name:  Aster Mc /Age/Sex: 1945  (67 y.o. male)   MRN & CSN:  [id number] & [id number] Admission Date/Time: 2021  4:54 PM   Location:  -A PCP: Fely Murry MD       Hospital Day: [facility] Course:   Aster Mc is a 67 y.o. obese male who presented to the [facility] ED with SOB diagnosed with COVID-19 and admitted to the medicine service on med/surg. His past medical history includes CAD, HTN, HLD, AMINA, AA, Hypothyroidism. CT of the chest contains parenchymal findings typical of COVID-19 pneumonia and patient has received Remdesivir, Steroid, and Convalescent Plasma. He is being followed for bradycardia and afib by Dr. Kayla Coles (Cardiology), Dr. Mark Nicholson for DM (Endocrinology), Dr. Telma Peralta for Pulmonology. On hospital day 3 patient became hypoxic and had severe hypoglycemia triggering a rapid response. Patient recovered after D50IV bolus and a D10 gtt was started. Cardiology started dopamine gtt. O2 was increased from 12L to 30L. His oxygen dependency again worsened by day 5 (21) and was placed on bipap, then intubated later that night. Assessment and Plan:     Acute respiratory failure with hypoxia secondary to COVID-19 viral pneumonia infection  SIRS criteria met at time of admission secondary to sepsis from viral pneumonia  COVID-19 positive: 2021  IV antibiotics, IV steroids initiated on 2021   Convalescent plasma given: 2021    Remdesivir:   Oxygen requirement today: ventilated now  · C HFrEF in acute exacerbation   · Morbid obesity with BMI of 47  · HTN, norvasc  · HLD, fenofibrate  · Chronic anxiety disorder, valium  · DM2 on PO medications only, uncontrolled  · Hypothyroidism, synthroid    No improvements  pulm management vents  Cardiology - dopamine gtt    Diet DIET TUBE FEED CONTINUOUS/CYCLIC NPO; Semi-elemental (Vital AF);  Nasogastric; Continuous; 10; 20; 24   DVT Prophylaxis [] Lovenox, []  Heparin, [] SCDs, []No VTE prophylaxis, patient ambulating   GI Prophylaxis [] PPI, [] H2 Blocker, [] No GI prophylaxis, patient is receiving diet/Tube Feeds   Code Status Full Code   Disposition guarded   MDM [] Low, [] Moderate,[x]  High  Patient's risk as above due to     [] One or more chronic illnesses with severe exacerbation or progression    [x] Acute or chronic illnesses or injuries that pose a threat to life or bodily function    [] An abrupt change in neurological status    [] Decision not to resuscitate     [] Drug therapy requiring intensive monitoring for toxicity      Subjective:     Pt S&E. Continued to be on vent  No improvements in last 24 hours    No ROS as he is intubated and sedated     Objective: Intake/Output Summary (Last 24 hours) at 1/17/2021 0955  Last data filed at 1/17/2021 0727  Gross per 24 hour   Intake 3217 ml   Output 2880 ml   Net 337 ml      Vitals:   Vitals:    01/17/21 0857   BP:    Pulse: 67   Resp: 17   Temp:    SpO2: 92%     Physical Exam:    GEN sedated male, laying in bed in no apparent distress. EYES Pupils are equally round. No scleral erythema, discharge, or conjunctivitis. HENT Mucous membranes are moist. +ETT +NGT  NECK No apparent thyromegaly or masses. RESP +rales or rhonchi. Symmetric chest movement while on invasive mechanical oxygen support  CARDIO/VASC S1/S2 auscultated. Regular rate without appreciable murmurs, rubs, or gallops. Peripheral pulses equal bilaterally and palpable. +1 pitting edema in extremities   GI Abdomen is soft without significant tenderness, masses, or guarding. Bowel sounds are normoactive. Rectal exam deferred.  Alan catheter is present. MSK No gross joint deformities. No spontaneous movement noted  SKIN Normal coloration, warm, dry. NEURO Does not follow directions. No response to verbal or painful stimuli  PSYCH Sedated on propofol.     Medications:   Medications:    insulin NPH  30 Units Subcutaneous QAM    insulin glargine  50 Units Subcutaneous Nightly    albuterol sulfate HFA  4 puff Inhalation Q4H    And    ipratropium  4 puff Inhalation Q4H    insulin lispro  0-36 Units Subcutaneous Q4H    chlorhexidine  15 mL Mouth/Throat BID    famotidine (PEPCID) injection  20 mg Intravenous BID    miconazole   Topical BID    cefepime  2 g Intravenous Q12H    vancomycin  1,750 mg Intravenous Q24H    apixaban  5 mg Oral BID    sodium chloride flush  10 mL Intravenous 2 times per day    sodium chloride flush  10 mL Intravenous 2 times per day    dexamethasone  6 mg Oral Daily    [Held by provider] amLODIPine  10 mg Oral Daily    atorvastatin  10 mg Oral Daily    [Held by provider] chlorthalidone  25 mg Oral Daily    ezetimibe  10 mg Oral Daily    fenofibrate  160 mg Oral Daily    folic acid  1 mg Oral Daily    levothyroxine  75 mcg Oral Daily    mesalamine  800 mg Oral TID    [Held by provider] losartan  100 mg Oral Daily    pantoprazole  40 mg Oral QAM AC    aspirin  81 mg Oral Daily      Infusions:    norepinephrine Stopped (01/16/21 0500)    fentanyl 100 mcg/hr (01/17/21 0718)    propofol 30 mcg/kg/min (01/17/21 0940)    dextrose      DOPamine 7 mcg/kg/min (01/17/21 0806)     PRN Meds:     polyvinyl alcohol, 1 drop, Q4H PRN    And      artificial tears, , Q4H PRN      sodium chloride flush, 10 mL, PRN      glucose, 15 g, PRN      dextrose, 12.5 g, PRN      glucagon (rDNA), 1 mg, PRN      dextrose, 100 mL/hr, PRN      albuterol sulfate HFA, 2 puff, Q6H PRN      sodium chloride flush, 10 mL, PRN      promethazine, 12.5 mg, Q6H PRN    Or      ondansetron, 4 mg, Q6H PRN      polyethylene glycol, 17 g, Daily PRN      acetaminophen, 650 mg, Q6H PRN    Or      acetaminophen, 650 mg, Q6H PRN      guaiFENesin-dextromethorphan, 5 mL, Q4H PRN      diazePAM, 5 mg, TID PRN      sodium chloride, 30 mL, PRN          Electronically signed by Emely Segura DO on 1/17/2021 at 9:55 AM

## 2021-01-17 NOTE — PROGRESS NOTES
Pt placed in supine position at 1000. Pt was in prone position from 1600 on 1/16 to 1000 on 1/17. FiO2 increased from 40% to 80% during this transition. PEEP remains at 9. Will attempt to wean fiO2 as pt tolerates.

## 2021-01-17 NOTE — PROGRESS NOTES
Hospitalist Progress Note      Name:  Natalio Ray /Age/Sex: 1945  (67 y.o. male)   MRN & CSN:  [id number] & [id number] Admission Date/Time: 2021  4:54 PM   Location:  -A PCP: Sadie Randall MD       Hospital Day: [facility] Course:   Natalio Ray is a 67 y.o. obese male who presented to the [facility] ED with SOB diagnosed with COVID-19 and admitted to the medicine service on med/surg. His past medical history includes CAD, HTN, HLD, AMINA, AA, Hypothyroidism. CT of the chest contains parenchymal findings typical of COVID-19 pneumonia and patient has received Remdesivir, Steroid, and Convalescent Plasma. He is being followed for bradycardia and afib by Dr. Jayla Brooks (Cardiology), Dr. Yojana Clinton for DM (Endocrinology), Dr. Kalyan Jim for Pulmonology. On hospital day 3 patient became hypoxic and had severe hypoglycemia triggering a rapid response. Patient recovered after D50IV bolus and a D10 gtt was started. Cardiology started dopamine gtt. O2 was increased from 12L to 30L. His oxygen dependency again worsened by day 5 (21) and was placed on bipap, then intubated later that night. Assessment and Plan:     Acute respiratory failure with hypoxia secondary to COVID-19 viral pneumonia infection  SIRS criteria met at time of admission secondary to sepsis from viral pneumonia  COVID-19 positive: 2021  IV antibiotics, IV steroids initiated on 2021   Convalescent plasma given: 2021    Remdesivir:   Oxygen requirement today: now on bipap  · Morbid obesity with BMI of 47  · HTN, norvasc  · HLD, fenofibrate  · Chronic anxiety disorder, valium  · DM2 on PO medications only, uncontrolled  · Hypothyroidism, synthroid    On bipap support  May need to be intubated  Am labs    Diet DIET TUBE FEED CONTINUOUS/CYCLIC NPO; Semi-elemental (Vital AF);  Nasogastric; Continuous; 10; 20; 24   DVT Prophylaxis [] Lovenox, []  Heparin, [] SCDs, []No VTE prophylaxis, patient ambulating   GI Prophylaxis [] PPI, [] H2 Blocker, [] No GI prophylaxis, patient is receiving diet/Tube Feeds   Code Status Full Code   Disposition guarded   MDM [] Low, [] Moderate,[x]  High  Patient's risk as above due to     [] One or more chronic illnesses with severe exacerbation or progression    [x] Acute or chronic illnesses or injuries that pose a threat to life or bodily function    [] An abrupt change in neurological status    [] Decision not to resuscitate     [] Drug therapy requiring intensive monitoring for toxicity      Subjective:     Pt S&E. No significant bipap ok  No fevers reported   Appears more lethargic today     No ROS as he is intubated and sedated     Objective: Intake/Output Summary (Last 24 hours) at 1/17/2021 0951  Last data filed at 1/17/2021 0727  Gross per 24 hour   Intake 3217 ml   Output 2880 ml   Net 337 ml      Vitals:   Vitals:    01/17/21 0857   BP:    Pulse: 67   Resp: 17   Temp:    SpO2: 92%     Physical Exam:    GEN sedated male, laying in bed in no apparent distress. EYES Pupils are equally round. No scleral erythema, discharge, or conjunctivitis. HENT Mucous membranes are moist. +ETT +NGT  NECK No apparent thyromegaly or masses. RESP +rales or rhonchi. Symmetric chest movement while on invasive mechanical oxygen support  CARDIO/VASC S1/S2 auscultated. Regular rate without appreciable murmurs, rubs, or gallops. Peripheral pulses equal bilaterally and palpable. +1 pitting edema in extremities   GI Abdomen is soft without significant tenderness, masses, or guarding. Bowel sounds are normoactive. Rectal exam deferred.  Alan catheter is present. MSK No gross joint deformities. No spontaneous movement noted  SKIN Normal coloration, warm, dry. NEURO Does not follow directions. No response to verbal or painful stimuli  PSYCH Sedated on propofol.     Medications:   Medications:    insulin NPH  30 Units Subcutaneous QAM    insulin glargine  50 Units Subcutaneous Nightly    albuterol

## 2021-01-17 NOTE — PROGRESS NOTES
pulmonary      SUBJECTIVE: intubated on vent     OBJECTIVE    VITALS:  BP (!) 117/56   Pulse 58   Temp 99.1 °F (37.3 °C)   Resp 16   Ht 5' 11.65\" (1.82 m)   Wt (!) 341 lb 14.9 oz (155.1 kg)   SpO2 92%   BMI 46.82 kg/m²   HEAD AND FACE EXAM:  No throat injection, no active exudate,no thrush  NECK EXAM;No JVD, no masses, symmetrical  CHEST EXAM; Expansion equal and symmetrical, no masses  LUNG EXAM; Good breath sounds bilaterally. There are expiratory wheezes both lungs, there are crackles at both lung bases  CARDIOVASCULAR EXAM: Positive S1 and S2, no S3 or S4, no clicks ,no murmurs  RIGHT AND LEFT LOWER EXTRIMITY EXAM: No edema, no swelling, no inflamation            LABS   Lab Results   Component Value Date    WBC 10.3 01/17/2021    HGB 10.7 (L) 01/17/2021    HCT 32.2 (L) 01/17/2021    MCV 93.6 01/17/2021     01/17/2021     Lab Results   Component Value Date    CREATININE 2.0 (H) 01/17/2021    BUN 46 (H) 01/14/2021     01/14/2021    K 4.4 01/14/2021    CL 96 (L) 01/14/2021    CO2 27 01/14/2021     Lab Results   Component Value Date    INR 1.02 01/08/2021    PROTIME 13.3 01/08/2021        No results found for: PHOS     Recent Labs     01/15/21  0600 01/16/21  0600 01/17/21  0600   PH 7.31* 7.35 7.35   PO2ART 113* 74* 86   HFK9DIR 54.0* 46.0* 46.0*   O2SAT 96.2 93.9* 95.2*         Wt Readings from Last 3 Encounters:   01/10/21 (!) 341 lb 14.9 oz (155.1 kg)               ASSESMENT  Ac resp atirte3b  covid pneumonia  abraham pneumonia        PLAN  1. cpm  2. Cont full vent support  3.  Add reglan    1/17/2021  Aracely Singer M.D.

## 2021-01-17 NOTE — PROGRESS NOTES
Hospitalist Progress Note      Name:  Babatunde Prince /Age/Sex: 1945  (67 y.o. male)   MRN & CSN:  [id number] & [id number] Admission Date/Time: 2021  4:54 PM   Location:  -A PCP: Johnathon Carroll MD       Hospital Day: [facility] Hospital Course:   Babatunde Prince is a 67 y.o. obese male who presented to the [facility] ED with SOB diagnosed with COVID-19 and admitted to the medicine service on med/surg. His past medical history includes CAD, HTN, HLD, AMINA, AA, Hypothyroidism. CT of the chest contains parenchymal findings typical of COVID-19 pneumonia and patient has received Remdesivir, Steroid, and Convalescent Plasma. He is being followed for bradycardia and afib by Dr. Ric Yates (Cardiology), Dr. Yaritza Pfeiffer for DM (Endocrinology), Dr. Franc George for Pulmonology. On hospital day 3 patient became hypoxic and had severe hypoglycemia triggering a rapid response. Patient recovered after D50IV bolus and a D10 gtt was started. Cardiology started dopamine gtt. O2 was increased from 12L to 30L. His oxygen dependency again worsened by day 5 (21) and was placed on bipap, then intubated later that night. Assessment and Plan:     Acute respiratory failure with hypoxia secondary to COVID-19 viral pneumonia infection  SIRS criteria met at time of admission secondary to sepsis from viral pneumonia  COVID-19 positive: 2021  IV antibiotics, IV steroids initiated on 2021   Convalescent plasma given: 2021    Remdesivir:   Oxygen requirement today: ventilated now  · Morbid obesity with BMI of 47  · HTN, norvasc  · HLD, fenofibrate  · Chronic anxiety disorder, valium  · DM2 on PO medications only, uncontrolled  · Hypothyroidism, synthroid    Tolerating vent ok  High vent settings needed to support him     Diet DIET TUBE FEED CONTINUOUS/CYCLIC NPO; Semi-elemental (Vital AF);  Nasogastric; Continuous; 10; 20; 24   DVT Prophylaxis [] Lovenox, []  Heparin, [] SCDs, []No VTE prophylaxis, patient ambulating   GI Prophylaxis [] PPI, [] H2 Blocker, [] No GI prophylaxis, patient is receiving diet/Tube Feeds   Code Status Full Code   Disposition guarded   MDM [] Low, [] Moderate,[x]  High  Patient's risk as above due to     [] One or more chronic illnesses with severe exacerbation or progression    [x] Acute or chronic illnesses or injuries that pose a threat to life or bodily function    [] An abrupt change in neurological status    [] Decision not to resuscitate     [] Drug therapy requiring intensive monitoring for toxicity      Subjective:     Pt S&E. Deteriorated overnight and was intubated  No fevers documented    No ROS as he is intubated and sedated     Objective: Intake/Output Summary (Last 24 hours) at 1/17/2021 0954  Last data filed at 1/17/2021 0727  Gross per 24 hour   Intake 3217 ml   Output 2880 ml   Net 337 ml      Vitals:   Vitals:    01/17/21 0857   BP:    Pulse: 67   Resp: 17   Temp:    SpO2: 92%     Physical Exam:    GEN sedated male, laying in bed in no apparent distress. EYES Pupils are equally round. No scleral erythema, discharge, or conjunctivitis. HENT Mucous membranes are moist. +ETT +NGT  NECK No apparent thyromegaly or masses. RESP +rales or rhonchi. Symmetric chest movement while on invasive mechanical oxygen support  CARDIO/VASC S1/S2 auscultated. Regular rate without appreciable murmurs, rubs, or gallops. Peripheral pulses equal bilaterally and palpable. +1 pitting edema in extremities   GI Abdomen is soft without significant tenderness, masses, or guarding. Bowel sounds are normoactive. Rectal exam deferred.  Alan catheter is present. MSK No gross joint deformities. No spontaneous movement noted  SKIN Normal coloration, warm, dry. NEURO Does not follow directions. No response to verbal or painful stimuli  PSYCH Sedated on propofol.     Medications:   Medications:    insulin NPH  30 Units Subcutaneous QAM    insulin glargine  50 Units Subcutaneous Nightly    albuterol sulfate HFA  4 puff Inhalation Q4H    And    ipratropium  4 puff Inhalation Q4H    insulin lispro  0-36 Units Subcutaneous Q4H    chlorhexidine  15 mL Mouth/Throat BID    famotidine (PEPCID) injection  20 mg Intravenous BID    miconazole   Topical BID    cefepime  2 g Intravenous Q12H    vancomycin  1,750 mg Intravenous Q24H    apixaban  5 mg Oral BID    sodium chloride flush  10 mL Intravenous 2 times per day    sodium chloride flush  10 mL Intravenous 2 times per day    dexamethasone  6 mg Oral Daily    [Held by provider] amLODIPine  10 mg Oral Daily    atorvastatin  10 mg Oral Daily    [Held by provider] chlorthalidone  25 mg Oral Daily    ezetimibe  10 mg Oral Daily    fenofibrate  160 mg Oral Daily    folic acid  1 mg Oral Daily    levothyroxine  75 mcg Oral Daily    mesalamine  800 mg Oral TID    [Held by provider] losartan  100 mg Oral Daily    pantoprazole  40 mg Oral QAM AC    aspirin  81 mg Oral Daily      Infusions:    norepinephrine Stopped (01/16/21 0500)    fentanyl 100 mcg/hr (01/17/21 0718)    propofol 30 mcg/kg/min (01/17/21 0940)    dextrose      DOPamine 7 mcg/kg/min (01/17/21 0806)     PRN Meds:     polyvinyl alcohol, 1 drop, Q4H PRN    And      artificial tears, , Q4H PRN      sodium chloride flush, 10 mL, PRN      glucose, 15 g, PRN      dextrose, 12.5 g, PRN      glucagon (rDNA), 1 mg, PRN      dextrose, 100 mL/hr, PRN      albuterol sulfate HFA, 2 puff, Q6H PRN      sodium chloride flush, 10 mL, PRN      promethazine, 12.5 mg, Q6H PRN    Or      ondansetron, 4 mg, Q6H PRN      polyethylene glycol, 17 g, Daily PRN      acetaminophen, 650 mg, Q6H PRN    Or      acetaminophen, 650 mg, Q6H PRN      guaiFENesin-dextromethorphan, 5 mL, Q4H PRN      diazePAM, 5 mg, TID PRN      sodium chloride, 30 mL, PRN          Electronically signed by Janusz Fajardo DO on 1/17/2021 at 9:54 AM

## 2021-01-18 NOTE — PROGRESS NOTES
Comprehensive Nutrition Assessment    Type and Reason for Visit:  Reassess    Nutrition Recommendations/Plan:   · Change EN to low calore, high protein with a goal of 65 mL/hr to provide the pt with 1560 kcal and 137 g if protein per day    Nutrition Assessment:  Pt EN has been paused ramón due to the pt being placed in prone. Please continue to feed the pt even in the prone position. Malnutrition Assessment:  Malnutrition Status:  Insufficient data    Context:  Acute Illness       Estimated Daily Nutrient Needs:  Energy (kcal):  2708-6447(hypocaloric feeds 11-14 kcal/kg); Weight Used for Energy Requirements:  Current     Protein (g):  120-162; Weight Used for Protein Requirements:  Ideal    Wounds:  None       Current Nutrition Therapies:    DIET NPO  Additional Calorie Sources:   615 kcal from propofol    Anthropometric Measures:  · Height: 5' 11.65\" (182 cm)  · Current Body Weight: 341 lb 14.9 oz (155.1 kg)   · Admission Body Weight: 341 lb 14.9 oz (155.1 kg)(unknown weight source)    · Usual Body Weight: (n/a)     · Ideal Body Weight: 176 lbs; % Ideal Body Weight 194.3 %   · BMI: 46.8  · BMI Categories: Obese Class 3 (BMI 40.0 or greater)       Nutrition Diagnosis:   · Inadequate oral intake related to impaired respiratory function as evidenced by NPO or clear liquid status due to medical condition      Nutrition Interventions:   Food and/or Nutrient Delivery:  Modify Tube Feeding  Nutrition Education/Counseling:  No recommendation at this time   Coordination of Nutrition Care:  Continue to monitor while inpatient    Goals:  pt will receive greater than 75% of his estimated needs       Nutrition Monitoring and Evaluation:   Behavioral-Environmental Outcomes:  None Identified   Food/Nutrient Intake Outcomes:  Enteral Nutrition Intake/Tolerance  Physical Signs/Symptoms Outcomes:  Biochemical Data, Weight, GI Status, Hemodynamic Status, Fluid Status or Edema     Discharge Planning:     Too soon to determine

## 2021-01-18 NOTE — PROGRESS NOTES
Hospitalist Progress Note      Name:  Leroy Wall /Age/Sex: 1945  (67 y.o. male)   MRN & CSN:  [id number] & [id number] Admission Date/Time: 2021  4:54 PM   Location:  -A PCP: Christiano Ervin MD       Hospital Day: [facility] Hospital Course:   Leroy Wall is a 67 y.o. obese male who presented to the [address] [place] [address] ED with SOB diagnosed with COVID-19 and admitted to the medicine service on med/surg. His past medical history includes CAD, HTN, HLD, AMINA, AA, Hypothyroidism. CT of the chest contains parenchymal findings typical of COVID-19 pneumonia and patient has received Remdesivir, Steroid, and Convalescent Plasma. He is being followed for bradycardia and afib by Dr. Jie Merlos (Cardiology), Dr. Alfonzo Louis for DM (Endocrinology), Dr. Rafia De La Fuente for Pulmonology. On hospital day 3 patient became hypoxic and had severe hypoglycemia triggering a rapid response. Patient recovered after D50IV bolus and a D10 gtt was started. Cardiology started dopamine gtt. O2 was increased from 12L to 30L. His oxygen dependency again worsened by day 5 (21) and was placed on bipap, then intubated later that night. Assessment and Plan:     Acute respiratory failure with hypoxia secondary to COVID-19 viral pneumonia infection  SIRS criteria met at time of admission secondary to sepsis from viral pneumonia  COVID-19 positive: 2021  IV antibiotics, IV steroids initiated on 2021   Convalescent plasma given: 2021    Remdesivir:   Oxygen requirement today: ventilated now  · C HFrEF in acute exacerbation   · Morbid obesity with BMI of 47  · HTN, norvasc  · HLD, fenofibrate  · Chronic anxiety disorder, valium  · DM2 on PO medications only, uncontrolled  · Hypothyroidism, synthroid    Tolerating vent well but no improvement  Appreciate consultants  Am labs    Diet DIET TUBE FEED CONTINUOUS/CYCLIC NPO; Semi-elemental (Vital AF);  Nasogastric; Continuous; 10; 20; 24   DVT Prophylaxis [] Lovenox, []  Heparin, [] SCDs, []No VTE prophylaxis, patient ambulating   GI Prophylaxis [] PPI, [] H2 Blocker, [] No GI prophylaxis, patient is receiving diet/Tube Feeds   Code Status Full Code   Disposition guarded   MDM [] Low, [] Moderate,[x]  High  Patient's risk as above due to     [] One or more chronic illnesses with severe exacerbation or progression    [x] Acute or chronic illnesses or injuries that pose a threat to life or bodily function    [] An abrupt change in neurological status    [] Decision not to resuscitate     [] Drug therapy requiring intensive monitoring for toxicity      Subjective:     Pt S&E. Intubated and sedated  No problems overnight reported     No ROS as he is intubated and sedated     Objective: Intake/Output Summary (Last 24 hours) at 1/17/2021 1955  Last data filed at 1/17/2021 1826  Gross per 24 hour   Intake 2650 ml   Output 2445 ml   Net 205 ml      Vitals:   Vitals:    01/17/21 1730   BP: (!) 125/56   Pulse: 77   Resp: 15   Temp:    SpO2: (!) 89%     Physical Exam:    GEN sedated male, laying in bed in no apparent distress. EYES Pupils are equally round. No scleral erythema, discharge, or conjunctivitis. HENT Mucous membranes are moist. +ETT +NGT  NECK No apparent thyromegaly or masses. RESP +rales or rhonchi. Symmetric chest movement while on invasive mechanical oxygen support  CARDIO/VASC RRR. rhonchi. Peripheral pulses equal bilaterally and palpable. +1 pitting edema in extremities   GI Abdomen is soft without significant tenderness, masses, or guarding. Bowel sounds are normoactive. Rectal exam deferred.  Alan catheter is present. MSK No gross joint deformities. No spontaneous movement noted  SKIN Normal coloration, warm, dry. NEURO Does not follow directions. No response to verbal or painful stimuli  PSYCH Sedated on propofol.  Fentanyl     Medications:   Medications:    [START ON 1/18/2021] vancomycin (VANCOCIN) intermittent dosing (placeholder)   Other RX Placeholder    metoclopramide  10 mg Intravenous TID    enoxaparin  40 mg Subcutaneous Daily    insulin NPH  30 Units Subcutaneous QAM    insulin glargine  50 Units Subcutaneous Nightly    albuterol sulfate HFA  4 puff Inhalation Q4H    And    ipratropium  4 puff Inhalation Q4H    insulin lispro  0-36 Units Subcutaneous Q4H    chlorhexidine  15 mL Mouth/Throat BID    famotidine (PEPCID) injection  20 mg Intravenous BID    miconazole   Topical BID    cefepime  2 g Intravenous Q12H    [Held by provider] apixaban  5 mg Oral BID    sodium chloride flush  10 mL Intravenous 2 times per day    sodium chloride flush  10 mL Intravenous 2 times per day    dexamethasone  6 mg Oral Daily    [Held by provider] amLODIPine  10 mg Oral Daily    [Held by provider] atorvastatin  10 mg Oral Daily    [Held by provider] chlorthalidone  25 mg Oral Daily    [Held by provider] ezetimibe  10 mg Oral Daily    fenofibrate  160 mg Oral Daily    [Held by provider] folic acid  1 mg Oral Daily    levothyroxine  75 mcg Oral Daily    [Held by provider] mesalamine  800 mg Oral TID    [Held by provider] losartan  100 mg Oral Daily    [Held by provider] pantoprazole  40 mg Oral QAM AC    aspirin  81 mg Oral Daily      Infusions:    norepinephrine Stopped (01/16/21 0500)    fentanyl 100 mcg/hr (01/17/21 1726)    propofol 40 mcg/kg/min (01/17/21 1830)    dextrose      DOPamine 8 mcg/kg/min (01/17/21 1542)     PRN Meds:     polyvinyl alcohol, 1 drop, Q4H PRN    And      artificial tears, , Q4H PRN      sodium chloride flush, 10 mL, PRN      glucose, 15 g, PRN      dextrose, 12.5 g, PRN      glucagon (rDNA), 1 mg, PRN      dextrose, 100 mL/hr, PRN      albuterol sulfate HFA, 2 puff, Q6H PRN      sodium chloride flush, 10 mL, PRN      promethazine, 12.5 mg, Q6H PRN    Or      ondansetron, 4 mg, Q6H PRN      polyethylene glycol, 17 g, Daily PRN      acetaminophen, 650 mg, Q6H PRN    Or      acetaminophen, 650 mg, Q6H PRN    

## 2021-01-18 NOTE — PROGRESS NOTES
[facility] [facility]  consulted by Dr. Donovan Arauz for monitoring and adjustment. Indication for treatment: Pneumonia  Goal trough: 15 mcg/mL, AUC/LOGAN 400-600    Pertinent Laboratory Values:   Temp Readings from Last 3 Encounters:   01/18/21 100.6 °F (38.1 °C)     Recent Labs     01/16/21  0644 01/17/21  0540 01/18/21  0530   WBC 10.2 10.3 11.0*     Recent Labs     01/17/21  0540 01/18/21  0530   BUN  --  82*   CREATININE 2.0* 2.2*     Estimated Creatinine Clearance: 44 mL/min (A) (based on SCr of 2.2 mg/dL (H)). Intake/Output Summary (Last 24 hours) at 1/18/2021 1502  Last data filed at 1/18/2021 1054  Gross per 24 hour   Intake 2998.07 ml   Output 2625 ml   Net 373.07 ml       Pertinent Cultures:  Date    Source    Results  1/8/21   Covid-19   Positive  1/8/21   Blood    NGTD  1/11/21  MRSA nasal   Negative  1/13/21  Respiratory   NGTD    Vancomycin level:   TROUGH:    No results for input(s): VANCOTROUGH in the last 72 hours. RANDOM:    Recent Labs     01/18/21  0530   VANCORANDOM 27.5       Assessment:  · WBC and temperature: WBC elevated @ 11; Tmax = 101F  · SCr, BUN, and urine output:  · ANASTASIA, Scr @ 2.2, not yet plateaued  · Baseline Scr 1.1  · Day(s) of therapy: #8  · Vancomycin concentration:  · 1/14: 9.7, collected after 3 doses, slightly below goal  · 1/18: 27.5, supra-therapeutic, collected 20h post-previous dose    Plan:  · Continue intermittent vancomycin dosing per levels 2/2 ANASTASIA  · Based on elevated level this AM, continue to hold vancomycin  · Repeat next random level tomorrow AM  · MRSA nasal screen is negative, can consider de-escalation for indication of MRSA pneumonia if appropriate per clinical course  · Pharmacy will continue to monitor patient and adjust therapy as indicated    VANCOMYCIN CONCENTRATION SCHEDULED FOR 1/19 @ 0600    Thank you for the consult.   Jeferson Tate, PharmD, [facility]   1/18/2021 3:02 PM

## 2021-01-18 NOTE — PROGRESS NOTES
Hospitalist Progress Note      Name:  Argentina Cohen /Age/Sex: 1945  (67 y.o. male)   MRN & CSN:  [id number] & [id number] Admission Date/Time: 2021  4:54 PM   Location:  -A PCP: John Arias MD       Hospital Day:  Course:   Argentina Cohen is a 67 y.o. obese male who presented to the [facility] ED with SOB diagnosed with COVID-19 and admitted to the medicine service on med/surg. His past medical history includes CAD, HTN, HLD, AMINA, AA, Hypothyroidism. CT of the chest contains parenchymal findings typical of COVID-19 pneumonia and patient has received Remdesivir, Steroid, and Convalescent Plasma. He is being followed for bradycardia and afib by Dr. Danika Cottrell (Cardiology), Dr. Julia Zafar for DM (Endocrinology), Dr. Didi Becker for Pulmonology. On hospital day 3 patient became hypoxic and had severe hypoglycemia triggering a rapid response. Patient recovered after D50IV bolus and a D10 gtt was started. Cardiology started dopamine gtt. O2 was increased from 12L to 30L. His oxygen dependency again worsened by day 5 (21) and was placed on bipap, then intubated later that night. Assessment and Plan:     Acute respiratory failure with hypoxia secondary to COVID-19 viral pneumonia infection  SIRS criteria met at time of admission secondary to sepsis from viral pneumonia  COVID-19 positive: 2021  IV antibiotics, IV steroids initiated on 2021   Convalescent plasma given: 2021    Remdesivir:   Oxygen requirement today: ventilated now  · C HFrEF in acute exacerbation   · Morbid obesity with BMI of 47  · HTN, norvasc  · HLD, fenofibrate  · Chronic anxiety disorder, valium  · DM2 on PO medications only, uncontrolled  · Hypothyroidism, synthroid     --> no significant improvement since placed on vent support. High NGT output; reglan added. Told RN to prone today to see if that helps his vent numbers. Need to call wife.  Hopeful     Diet DIET TUBE FEED CONTINUOUS/CYCLIC NPO; Semi-elemental (Vital AF); Nasogastric; Continuous; 10; 20; 24   DVT Prophylaxis [] Lovenox, []  Heparin, [] SCDs, []No VTE prophylaxis, patient ambulating   GI Prophylaxis [] PPI, [] H2 Blocker, [] No GI prophylaxis, patient is receiving diet/Tube Feeds   Code Status Full Code   Disposition guarded   MDM [] Low, [] Moderate,[x]  High  Patient's risk as above due to     [] One or more chronic illnesses with severe exacerbation or progression    [x] Acute or chronic illnesses or injuries that pose a threat to life or bodily function    [] An abrupt change in neurological status    [] Decision not to resuscitate     [] Drug therapy requiring intensive monitoring for toxicity      Subjective:     Pt S&E.      supine  High vent settings  No fevers  High residual out of NGT     No ROS as he is intubated and sedated     Objective: Intake/Output Summary (Last 24 hours) at 1/18/2021 0631  Last data filed at 1/18/2021 0600  Gross per 24 hour   Intake 3678 ml   Output 2325 ml   Net 1353 ml      Vitals:   Vitals:    01/18/21 0600   BP: (!) 113/52   Pulse: 60   Resp: 15   Temp:    SpO2: 91%     Physical Exam:    GEN sedated male, laying in bed in no apparent distress. EYES Pupils are equally round. No scleral erythema, discharge, or conjunctivitis. HENT Mucous membranes are moist. +ETT +NGT  NECK No apparent thyromegaly or masses. RESP +rales or rhonchi. Symmetric chest movement while on invasive mechanical oxygen support  CARDIO/VASC RRR. rhonchi. Peripheral pulses equal bilaterally and palpable. +1 pitting edema in extremities   GI Abdomen is soft without significant tenderness, masses, or guarding. Hypoactive bowels.  Alan catheter is present. MSK No gross joint deformities. No spontaneous movement noted  SKIN Normal coloration, warm, dry. NEURO Does not follow directions.  No response to verbal or painful stimuli  PSYCH Sedated     Medications:   Medications:    vancomycin (VANCOCIN) intermittent dosing (placeholder)   Other RX Placeholder    metoclopramide  10 mg Intravenous TID    enoxaparin  40 mg Subcutaneous Daily    insulin NPH  30 Units Subcutaneous QAM    insulin glargine  50 Units Subcutaneous Nightly    albuterol sulfate HFA  4 puff Inhalation Q4H    And    ipratropium  4 puff Inhalation Q4H    insulin lispro  0-36 Units Subcutaneous Q4H    chlorhexidine  15 mL Mouth/Throat BID    famotidine (PEPCID) injection  20 mg Intravenous BID    miconazole   Topical BID    cefepime  2 g Intravenous Q12H    [Held by provider] apixaban  5 mg Oral BID    sodium chloride flush  10 mL Intravenous 2 times per day    sodium chloride flush  10 mL Intravenous 2 times per day    dexamethasone  6 mg Oral Daily    [Held by provider] amLODIPine  10 mg Oral Daily    [Held by provider] atorvastatin  10 mg Oral Daily    [Held by provider] chlorthalidone  25 mg Oral Daily    [Held by provider] ezetimibe  10 mg Oral Daily    fenofibrate  160 mg Oral Daily    [Held by provider] folic acid  1 mg Oral Daily    levothyroxine  75 mcg Oral Daily    [Held by provider] mesalamine  800 mg Oral TID    [Held by provider] losartan  100 mg Oral Daily    [Held by provider] pantoprazole  40 mg Oral QAM AC    aspirin  81 mg Oral Daily      Infusions:    norepinephrine Stopped (01/16/21 0500)    fentanyl 100 mcg/hr (01/18/21 0523)    propofol 40 mcg/kg/min (01/18/21 0600)    dextrose      DOPamine 8 mcg/kg/min (01/18/21 0327)     PRN Meds:     polyvinyl alcohol, 1 drop, Q4H PRN    And      artificial tears, , Q4H PRN      sodium chloride flush, 10 mL, PRN      glucose, 15 g, PRN      dextrose, 12.5 g, PRN      glucagon (rDNA), 1 mg, PRN      dextrose, 100 mL/hr, PRN      albuterol sulfate HFA, 2 puff, Q6H PRN      sodium chloride flush, 10 mL, PRN      promethazine, 12.5 mg, Q6H PRN    Or      ondansetron, 4 mg, Q6H PRN      polyethylene glycol, 17 g, Daily PRN      acetaminophen, 650 mg, Q6H PRN Or      acetaminophen, 650 mg, Q6H PRN      guaiFENesin-dextromethorphan, 5 mL, Q4H PRN      diazePAM, 5 mg, TID PRN      sodium chloride, 30 mL, PRN          Electronically signed by Wilbur Manzano DO on 1/18/2021 at 6:31 AM

## 2021-01-18 NOTE — PROGRESS NOTES
left, stable.  No   pneumothorax.           Impression   1. Stable bilateral lung infiltrates, with associated small bilateral pleural   effusions, right side greater than left. ASSESMENT  Ac resp failure  covid pneumonia  abraham pneumonia        PLAN  1. cpm  2. Cont full vent support  3. He is on 65% fio2 and peep 9 so not weanable at present  4.  Tube feed    1/18/2021  Ita Kee M.D.

## 2021-01-18 NOTE — PROGRESS NOTES
01/18/21 0405   Vent Information   Vent Type 980   Vent Mode AC/VC+   Vt Ordered 550 mL   Rate Set 16 bmp   Peak Flow 0 L/min   Pressure Support 0 cmH20   FiO2  65 %   SpO2 94 %   SpO2/FiO2 ratio 144.62   Sensitivity 3   PEEP/CPAP 9   I Time/ I Time % 1 s   Humidification Source HME   Vent Patient Data   High Peep/I Pressure 0   Peak Inspiratory Pressure 26 cmH2O   Mean Airway Pressure 15 cmH20   Rate Measured 22 br/min   Vt Exhaled 602 mL   Minute Volume 9.22 Liters   I:E Ratio 1:1.50   Cough/Sputum   Sputum How Obtained Endotracheal;Suctioned   $Obtained Sample $Nasotracheal Suction   Cough Productive   Sputum Amount Moderate   Sputum Color Creamy; Yellow; Tan   Tenacity Thick   Spontaneous Breathing Trial (SBT) RT Doc   Pulse 59   Breath Sounds   Right Upper Lobe Diminished   Right Middle Lobe Diminished   Right Lower Lobe Diminished   Left Upper Lobe Diminished   Left Lower Lobe Diminished   Additional Respiratory  Assessments   Resp 14   Alarm Settings   High Pressure Alarm 50 cmH2O   Delay Alarm 20 sec(s)   Low Minute Volume Alarm 2.5 L/min   Apnea (secs) 20 secs   High Respiratory Rate 40 br/min   Low Exhaled Vt  250 mL   ETT (adult)   Placement Date/Time: 01/13/21 (c) 2625   Preoxygenation: Yes  Mask Ventilation: Ventilated by mask (1)  Technique: Video laryngoscopy  Tube Size: 7.5 mm  Laryngoscope: Mac  Blade Size: 4  Grade View: Full view of the glottis  Insertion attempts: 1  Pl. ..    Secured at 26 cm   Measured From Lips   ET Placement Left   Secured By Commercial tube matthew   Site Condition Dry   Cuff Pressure   (mlt)

## 2021-01-19 NOTE — CONSULTS
Nephrology Service Consultation    Patient:  Jessica Hanson  MRN: [id number]  Consulting physician:  Sade Conley MD  Reason for Consult:   ANASTASIA on stage 3A CKD     History Obtained From:  Medical records / staff   PCP: Sara Wolff MD    HISTORY OF PRESENT ILLNESS:   The patient is a 67 y.o. male who was transported   to [facility] ED on January 8th. Patient had presented  To the ED with: SOB / malaise / cough and joint pain. Hospital Course so far: patient's respiratory status  Had declined for which he was intubated on January 15th  He is presently on Dopamine drip for BP support. Patient also had bradycardia / atrial fib for which he was followed by cardiology     REVIEW OF SYSTEMS:  Unable to perform   due to patient's currently impaired sensorium     Medical History: (collected from medical records within [facility])  CAD / DM2 / HTN / AMINA / hypothyroidism  ulcerative colitis / mood disorder / atrial fib      Surgical History: (collected from medical records within [facility]):   CABG (3 vessel) / endovascular repair of AAA     Renal History: (collected from medical records within [facility]):  stage 3A CKD with estimated baseline creatinine: 1.1 - 1.3              SOCIAL HISTORY: (collected from medical records within [facility]):    Tobacco: not known     Alcohol: not known    Demographic History; prior to admission: residing at home     Medications:   Scheduled Meds:   heparin (porcine)  80 Units/kg Intravenous Once    vancomycin (VANCOCIN) intermittent dosing (placeholder)   Other RX Placeholder    metoclopramide  10 mg Intravenous TID    enoxaparin  40 mg Subcutaneous Daily    insulin NPH  30 Units Subcutaneous QAM    insulin glargine  50 Units Subcutaneous Nightly    albuterol sulfate HFA  4 puff Inhalation Q4H    And    ipratropium  4 puff Inhalation Q4H    insulin lispro  0-36 Units Subcutaneous Q4H    chlorhexidine  15 mL Mouth/Throat BID    famotidine (PEPCID) injection  20 mg Intravenous BID    miconazole receiving TF    Extremities: + edema to bilateral lower legs     CBC:   Recent Labs     01/17/21  0540 01/18/21  0530 01/19/21  0600   WBC 10.3 11.0* 11.4*   HGB 10.7* 10.3* 10.6*    286 262     BMP:    Recent Labs     01/17/21  0540 01/18/21  0530   NA  --  124*   K  --  4.8   CL  --  88*   CO2  --  22   BUN  --  82*   CREATININE 2.0* 2.2*   GLUCOSE  --  151*     Hepatic: No results for input(s): AST, ALT, ALB, BILITOT, ALKPHOS in the last 72 hours. Troponin: No results for input(s): TROPONINI in the last 72 hours. Mg, Phos: No results for input(s): MG, PHOS in the last 72 hours. ABGs:   Lab Results   Component Value Date    PO2ART 49 01/19/2021    GHJ5QGK 39.0 01/19/2021     INR: No results for input(s): INR in the last 72 hours. -----------------------------------------------------------------  Patient Active Problem List   Diagnosis Code    COVID-19 U07.1     Assessment and Recommendations     Impression   1. ANASTASIA on stage 3A CKD (ATN vs. Pre-renal azotemia)  -likely multi-factorial etiology for ANASTASIA including:  Diminished renal perfusion secondary to hypotension   Cannot exclude infection as contributor to ANASTASIA     2. Hyponatremia   3. Acute hypoxic respiratory failure   4. Covid 19 PNA   5. Hypotension   6. DM2  7. Anemia     PLAN   1.   -uop: 1.02 liters in the last 24 hours via zamora   -latest serum creatinine 2.2 with normal  K / CO2  -additional labs: upc, chemistry panel qam, ua with micro, magnesium  2.   -latest serum sodium:124; following trend   -chemistry results from this am are pending   3.   -ventilator management via pulmonary   4.  -presently on MDI's / cefepime / Vanco  5.    -BP trend: systolic BP's have recently been 90 - 107  -presently on Dopamine drip   6.   -on Lantus and SSI for diabetes management and NPH  7.    -latest Hb: 10.6; follow hemoglobin trend     Electronically signed by AMINA Cardenas - CNP      Nephrology Attending Progress Note  1/19/2021 5:24 PM  Subjective: Interval History: I have personally performed face to face diagnostic evaluation on this patient. I have personally reviewed pertinent labs and imaging and agree with the care plan above. My additional findings are as follows: The patient is a 67 y.o. male who presents with cad and Dm2 with latasha and dx covid and admit need vent on prone vent with arf and low na in above setting.      Objective:   Vitals: /63   Pulse 60   Temp 98.4 °F (36.9 °C) (Rectal)   Resp 15   Ht 5' 11.65\" (1.82 m)   Wt (!) 341 lb 14.9 oz (155.1 kg)   SpO2 95%   BMI 46.82 kg/m²   Sedated ett  Decrease bs  edema    Assessment and Plan:  1 on vent and monitor  2 try diuresis  3 start vaprisol with na low and mointor  4 wrose arf sith diuresis and dw son- place temp hd line and possible dialysis in am  5 K monitor  Will follow and updated son  No hd tonite yet       Electronically signed by Zonia Segura MD on 1/19/2021 at 5:24 PM

## 2021-01-19 NOTE — FLOWSHEET NOTE
Patient tolerating prone position with improved SpO2 and stable hemodynamics.         01/19/21 0932   Vitals   Pulse 70   Resp 20   BP (!) 101/55   MAP (mmHg) 68   Oxygen Therapy   SpO2 97 %   FiO2  80 %   Vent Settings   Rate Set 16 bmp   Rate Measured 16 br/min   Vt Ordered 550 mL   Vt Exhaled 948 mL   PEEP/CPAP 12   Pressure Support 0 cmH20   Peak Inspiratory Pressure 28 cmH2O   Pain Assessment   RASS Score -2   CPOT (Critical Patient)   CPOT (Critical Patient) Facial Expression 0   CPOT (Critical Patient) Body Movement 0   CPOT (Critical Patient) Muscle Tension 0   CPOT (Critical Patient) Compiance with Vent 0   Score CPOT (Vent) 0   CPOT (Critial Patient) Vent Status Intubated

## 2021-01-19 NOTE — PLAN OF CARE
Attempted US but PT was in prone position upon arrival at room and unable to scan in this position. Spoke with Jeannette Posada RN ([id number]) who stated that pt can not tolerate supine position at this time. Pt will be turned supine tomorrow about 0930 HRS and may attempt then.

## 2021-01-19 NOTE — PROGRESS NOTES
Progress Note( Dr. Patrick Grace)  1/18/2021  Subjective:   Admit Date: 1/8/2021  PCP: Nicolas Arboleda MD    Admitted For :Hypoxia, shortness of breath, Covid pneumonia    Consulted For: Better control of blood glucose    Interval History: Patient is respiratory status got worse was sedated intubated and placed on ventilator on 1/13/2021  patient's blood glucose are better       Intake/Output Summary (Last 24 hours) at 1/18/2021 2319  Last data filed at 1/18/2021 1755  Gross per 24 hour   Intake 2193.74 ml   Output 2165 ml   Net 28.74 ml       DATA    CBC:   Recent Labs     01/16/21  0644 01/17/21  0540 01/18/21  0530   WBC 10.2 10.3 11.0*   HGB 10.8* 10.7* 10.3*    321 286    CMP:  Recent Labs     01/17/21  0540 01/18/21  0530   NA  --  124*   K  --  4.8   CL  --  88*   CO2  --  22   BUN  --  82*   CREATININE 2.0* 2.2*   CALCIUM  --  7.6*     Lipids:   Lab Results   Component Value Date    CHOL 115 10/29/2012    HDL 39 10/29/2012    TRIG 80 10/29/2012     Glucose:  Recent Labs     01/18/21  1334 01/18/21  1702 01/18/21  2111   POCGLU 117* 115* 107*     [id number]:No results found for: LABA1C  High Sensitivity TSH:   Lab Results   Component Value Date    TSHHS 2.320 10/29/2012     Free T3: No results found for: FT3  Free T4:  Lab Results   Component Value Date    T4FREE 0.89 10/29/2012       Xr Chest Portable    Result Date: 1/8/2021  EXAMINATION: ONE XRAY VIEW OF THE CHEST 1/8/2021 6:12 pm COMPARISON: 02/17/2014 radiograph HISTORY: ORDERING SYSTEM PROVIDED HISTORY: sob \       No acute finding in the chest.     Ct Chest Pulmonary Embolism W Contrast    Result Date: 1/9/2021  EXAMINATION: CTA OF THE CHEST 1/9/2021 4:02 pm       1. No pulmonary embolism. 2. Pulmonary parenchymal findings typical of mild COVID-19 pneumonia.   Note that CT pulmonary findings of COVID-19 show overlap with other disease entities including H1N1 influenza, other viral pneumonia (i.e. adenovirus, CMV), organizing pneumonia, alveolar proteinosis and acute interstitial pneumonitis. 3. Trace bilateral pleural effusion. 4. Emphysema. 5. Calcific atherosclerosis aorta and coronary arteries. 6. Mild mediastinal and right hilar lymph node enlargement is almost certainly reactive. This should be followed with IV contrast-enhanced CT chest in 3 months to ensure stability.        Scheduled Medicines   Medications:    vancomycin (VANCOCIN) intermittent dosing (placeholder)   Other RX Placeholder    metoclopramide  10 mg Intravenous TID    enoxaparin  40 mg Subcutaneous Daily    insulin NPH  30 Units Subcutaneous QAM    insulin glargine  50 Units Subcutaneous Nightly    albuterol sulfate HFA  4 puff Inhalation Q4H    And    ipratropium  4 puff Inhalation Q4H    insulin lispro  0-36 Units Subcutaneous Q4H    chlorhexidine  15 mL Mouth/Throat BID    famotidine (PEPCID) injection  20 mg Intravenous BID    miconazole   Topical BID    cefepime  2 g Intravenous Q12H    [Held by provider] apixaban  5 mg Oral BID    sodium chloride flush  10 mL Intravenous 2 times per day    sodium chloride flush  10 mL Intravenous 2 times per day    dexamethasone  6 mg Oral Daily    [Held by provider] amLODIPine  10 mg Oral Daily    [Held by provider] atorvastatin  10 mg Oral Daily    [Held by provider] chlorthalidone  25 mg Oral Daily    [Held by provider] ezetimibe  10 mg Oral Daily    fenofibrate  160 mg Oral Daily    [Held by provider] folic acid  1 mg Oral Daily    levothyroxine  75 mcg Oral Daily    [Held by provider] mesalamine  800 mg Oral TID    [Held by provider] losartan  100 mg Oral Daily    [Held by provider] pantoprazole  40 mg Oral QAM AC    aspirin  81 mg Oral Daily      Infusions:    norepinephrine Stopped (01/16/21 0500)    fentanyl 75 mcg/hr (01/18/21 1710)    propofol 20 mcg/kg/min (01/18/21 2045)    dextrose      DOPamine 11 mcg/kg/min (01/18/21 2303)         Objective:   Vitals: BP (!) 108/54   Pulse 61   Temp 100.5 °F (38.1 °C) (Rectal)   Resp 17   Ht 5' 11.65\" (1.82 m)   Wt (!) 341 lb 14.9 oz (155.1 kg)   SpO2 90%   BMI 46.82 kg/m²   General appearance: Sedated, intubated and on ventilator   neck: no JVD or bruit  Thyroid : Normal lobes   Lungs: Has Vesicular Breath sounds has some wheezing and some rales intubated and on ventilator  Heart:  regular rate and rhythm  Abdomen: soft, non-tender; bowel sounds normal; no masses,  no organomegaly  Musculoskeletal: Normal  Extremities: extremities normal, , no edema  Neurologic: Sedated, intubated and on ventilator    Assessment:     Patient Active Problem List:     COVID-19       Pneumonia        Diabetes mellitus        Hypertension        Hyperlipidemia        Obstructive sleep apnea        Obesity      Plan:     1. Reviewed POC blood glucose . Labs and X ray results   2. Reviewed Current Medicines   3. On  Correction bolus Humalog/ Basal Lantus Insulin regime and also NPH insulin  4. Monitor Blood glucose frequently   5. Modified  the dose of Insulin/ other medicines as needed   6. Will follow     .      Shania Sampson MD

## 2021-01-19 NOTE — PROGRESS NOTES
Patient with large brown secretions around oropharnyx. Appears to be stomach contents brown and green in color. Patients ETT suctioned with return of thin white sections. Tube feeds stopped. Patient placed back to wall suction low-intermittent with immediate return of 600 in residual at this time. Continuing suction. Provider notified.

## 2021-01-19 NOTE — PROGRESS NOTES
[address] [facility]  consulted by Dr. Gatito Iraheta for monitoring and adjustment. Indication for treatment: Pneumonia  Goal trough: 15 mcg/mL, AUC/LOGAN 400-600    Pertinent Laboratory Values:   Temp Readings from Last 3 Encounters:   01/19/21 99 °F (37.2 °C) (Rectal)     Recent Labs     01/17/21  0540 01/18/21  0530 01/19/21  0600   WBC 10.3 11.0* 11.4*     Recent Labs     01/17/21  0540 01/18/21  0530   BUN  --  82*   CREATININE 2.0* 2.2*     Estimated Creatinine Clearance: 44 mL/min (A) (based on SCr of 2.2 mg/dL (H)). Intake/Output Summary (Last 24 hours) at 1/19/2021 1147  Last data filed at 1/19/2021 0800  Gross per 24 hour   Intake 1686.82 ml   Output 665 ml   Net 1021.82 ml       Pertinent Cultures:  Date    Source    Results  1/8/21   Covid-19   Positive  1/8/21   Blood    NGTD  1/11/21  MRSA nasal   Negative  1/13/21  Respiratory   NGTD    Vancomycin level:   TROUGH:    No results for input(s): VANCOTROUGH in the last 72 hours. RANDOM:    Recent Labs     01/18/21  0530 01/19/21  0600   VANCORANDOM 27.5 22.8       Assessment:  · WBC and temperature: WBC elevated @ 11.4; Tmax = 101.2F  · SCr, BUN, and urine output:  · ANASTASIA, Scr @ 2.2, had not plateaued yesterday  · No new renal labs today  · Baseline Scr 1.1  · Day(s) of therapy: #9  · Vancomycin concentration:  · 1/14: 9.7, collected after 3 doses, slightly below goal  · 1/18: 27.5, supra-therapeutic, 10h post-dose  · 1/19: 22.8, supra-therapeutic, 45h post-dose    Plan:  · Continue intermittent vancomycin dosing per levels 2/2 ANASTASIA  · Based on elevated level this AM, continue to hold vancomycin  · Repeat next random level tomorrow AM  · MRSA nasal screen is negative, can consider de-escalation for indication of MRSA pneumonia if appropriate per clinical course  · Pharmacy will continue to monitor patient and adjust therapy as indicated    VANCOMYCIN CONCENTRATION SCHEDULED FOR 1/20 @ 0600    Thank you for the consult.   Svetlana GOMEZ Prasanna Blount, PharmD, [address]   1/19/2021 11:47 AM

## 2021-01-19 NOTE — CARE COORDINATION
Patient on vent on COVID unit. Patient is from home alone and was independent PTA. Case Management to follow to continue to assist with discharge plan/needs.

## 2021-01-19 NOTE — FLOWSHEET NOTE
Patient placed in the prone position.         01/19/21 0918   Vitals   Pulse 70   Resp 25   BP (!) 100/41   MAP (mmHg) (!) 57   Patient Position Prone   Oxygen Therapy   SpO2 90 %   FiO2  80 %   Vent Settings   Rate Set 16 bmp   Rate Measured 21 br/min   Vt Ordered 550 mL   Vt Exhaled 986 mL   PEEP/CPAP 12   Pressure Support 0 cmH20   Peak Inspiratory Pressure 30 cmH2O   Pain Assessment   RASS Score +1   CPOT (Critical Patient)   CPOT (Critical Patient) Facial Expression 2   CPOT (Critical Patient) Body Movement 2   CPOT (Critical Patient) Muscle Tension 1   CPOT (Critical Patient) Compiance with Vent 1   Score CPOT (Vent) 6   CPOT (Critial Patient) Vent Status Intubated

## 2021-01-19 NOTE — PROGRESS NOTES
Increase Peep to 12. Results for [place] (MRN [id number]) as of 1/19/2021 08:34   Ref.  Range 1/19/2021 06:00   pH, Bld Latest Ref Range: 7.34 - 7.45  7.33 (L)   Base Excess Latest Ref Range: 0 - 3.3  5 (H)   O2 Sat Latest Ref Range: 96 - 97 % 81.0 (L)   Carbon Monoxide, Blood Latest Ref Range: 0 - 5 % 1.9   CO2 Content Latest Ref Range: 19 - 24 MMOL/L 21.8   pCO2, Arterial Latest Ref Range: 32 - 45 MMHG 39.0   pO2, Arterial Latest Ref Range: 75 - 100 MMHG 49 (L)   HCO3, Arterial Latest Ref Range: 18 - 23 MMOL/L 20.6   Methemoglobin, Arterial Latest Ref Range: <1.5 % 1.7 (H)

## 2021-01-19 NOTE — PROGRESS NOTES
pulmonary      SUBJECTIVE: intubated sedated on vent     OBJECTIVE    VITALS:  BP (!) 121/58   Pulse 63   Temp 100 °F (37.8 °C)   Resp 18   Ht 5' 11.65\" (1.82 m)   Wt (!) 341 lb 14.9 oz (155.1 kg)   SpO2 (!) 88%   BMI 46.82 kg/m²   HEAD AND FACE EXAM:  No throat injection, no active exudate,no thrush  NECK EXAM;No JVD, no masses, symmetrical  CHEST EXAM; Expansion equal and symmetrical, no masses  LUNG EXAM; Good breath sounds bilaterally. There are expiratory wheezes both lungs, there are crackles at both lung bases  CARDIOVASCULAR EXAM: Positive S1 and S2, no S3 or S4, no clicks ,no murmurs  RIGHT AND LEFT LOWER EXTRIMITY EXAM: No edema, no swelling, no inflamation            LABS   Lab Results   Component Value Date    WBC 11.4 (H) 01/19/2021    HGB 10.6 (L) 01/19/2021    HCT 31.3 (L) 01/19/2021    MCV 94.3 01/19/2021     01/19/2021     Lab Results   Component Value Date    CREATININE 2.2 (H) 01/18/2021    BUN 82 (H) 01/18/2021     (L) 01/18/2021    K 4.8 01/18/2021    CL 88 (L) 01/18/2021    CO2 22 01/18/2021     Lab Results   Component Value Date    INR 1.02 01/08/2021    PROTIME 13.3 01/08/2021        No results found for: PHOS     Recent Labs     01/17/21  0600 01/18/21  0600 01/19/21  0600   PH 7.35 7.34 7.33*   PO2ART 86 64* 49*   [id number] 46.0* 44.0 39.0   O2SAT 95.2* 91.1* 81.0*         Wt Readings from Last 3 Encounters:   01/10/21 (!) 341 lb 14.9 oz (155.1 kg)        EXAMINATION:   ONE XRAY VIEW OF THE CHEST       1/19/2021 5:04 am       COMPARISON:   01/18/2021       HISTORY:   ORDERING SYSTEM PROVIDED HISTORY: ventilator   TECHNOLOGIST PROVIDED HISTORY:   Reason for exam:->ventilator   Reason for Exam: vent   Acuity: Acute   Type of Exam: Subsequent/Follow-up       FINDINGS:   Endotracheal tube and transesophageal gastric tube remain in place.  Right   upper extremity PICC is unchanged.  Stable bilateral pulmonary opacities and   bilateral pleural effusions, right greater than left.  No evidence for   pneumothorax.  The cardiac silhouette and osseous structures are stable.           Impression   No significant interval change.                   ASSESMENT  Ac resp failure  covid pneumonia  abraham pneumonia        PLAN  1. cpm  2. Cont full vent support  3.  Not weanable at present    1/19/2021  Junior Storm M.D.

## 2021-01-19 NOTE — CONSULTS
Infectious Disease Consult Note  2021   Patient Name: Aster Mc : 1945   Impression  Critical COVID-19 pneumonia with acute hypoxic respiratory failure. Date of symptom onset:2021  Date of positive COVID-19 PCR: 2021  Exposure:   Oxygen supplementation: MV  Bacterial infection: probable  BMI:46.8 kg/m2  hyperinflammation phase  Encephalopathy:  ANASTASIA:   Elevated bilirubin  COVID-associated Coagulopathy  Elevated D-dimer  T2DM  HLD  HTN   Multi-morbidity: per PMHx  Plan:   Therapeutic: on vancomycin and cefepime. D/c dexamethasone. Start  Solumedrol   Diagnostic:Trend CRP and procalcitonin. (If not already done then) check, HIV screen, aspergillus Ag, BDG, respiratory culture.  F/u test:     Thank you for allowing me to consult in the care of this patient.  ------------------------  REASON FOR CONSULT: Infective syndrome   Requested by: Dr. Dinh Polanco  History obtained from chart review, RN as the patient is intubated  HPI:Patient is a 67 y.o.  male T2DM,CAD, HLD, HTN, AMINA on CPAP, AAA, who was admitted 2021 for further evaluation and management of SOB that started on 2021. He had associated, fatigue, myalgia, cough. Tested positive for SARS-CoV-2 by NAAT on 2021. Rapidly worsened and was intubated on 2021. Treated with remdesivir for 3 days, and has remained on vancomycin, cefepime and dexamethasone. ? Infectious diseases service was consulted to evaluate the pt, and recommend further investigative and therapeutic measures.   Review and summary of old records:  ROS:   Unable to obtain; pt on vent  Patient Active Problem List    Diagnosis Date Noted    COVID-19 2021     Past Medical History:   Diagnosis Date    [facility] (AAA[facility] rupture [facility] [address]     Anxiety     Diabetes mellitus ([facility] [address]     Edema     Hyperlipidemia     MI (myocardial infarction) ([facility] [address]     Dr. Lama-cardiologist    AMINA on CPAP     Ulcerative colitis ([facility] [address] Past Surgical History:   Procedure Laterality Date    CORONARY ARTERY BYPASS GRAFT  1995    TONSILLECTOMY        No family history on file. Infectious disease related family history - not contibutory. SOCIAL HISTORY  Social History     Tobacco Use    Smoking status: Not on file   Substance Use Topics    Alcohol use: Never     Frequency: Never     Binge frequency: Never       Born:  [address] Occupation:   No recent travel of significance.  No recent unusual exposures.  NO pets    ? ALLERGIES  No Known Allergies   MEDICATIONS  Reviewed and are per the chart/EMR. IMMUNIZATION HISTORY    There is no immunization history on file for this patient. ? Antibiotics:   Vancomycin  Cefepime  ?  -------------------------------------------------------------------------------------------------------------------    Vital Signs:  Vitals:    01/19/21 1300   BP: (!) 107/53   Pulse: 65   Resp: 14   Temp: 98.4 °F (36.9 °C)   SpO2: 95%         Exam:    VS: noted; wt 155.1 kg  Gen: intubated, and mechanically ventilated  Skin: no stigmata of endocarditis  Wounds: C/D/I  HEMT: AT/NC ETT  Eyes: PERRL  Neck: Supple. Trachea midline. No LAD. Chest: no distress and CTA. Good air movement. Heart: RRR and no MRG. Abd: soft, non-distended, no tenderness, no hepatomegaly. Normoactive bowel sounds. Ext: no clubbing, cyanosis, or edema  Catheter Site: without erythema or tenderness  LDA: CVC, Urethral catheter:  Neuro: sedated    ? Diagnostic Studies: reviewed  ? ? I have examined this patient and available medical records on this date and have made the above observations, conclusions and recommendations.   Electronically signed by: Electronically signed by Bridger Cherry MD on 1/19/2021 at 2:39 PM

## 2021-01-19 NOTE — PROGRESS NOTES
Hospitalist Progress Note      Name:  Natalio Ray /Age/Sex: 1945  (67 y.o. male)   MRN & CSN:  [id number] & [id number] Admission Date/Time: 2021  4:54 PM   Location:  - PCP: Sadie Randall MD         Hospital Day: 12    Assessment and Plan:   Natalio Ray is a 67 y.o.  male  who presents with shortness of breath. · Acute respiratory failure with hypoxia secondary to COVID-19 viral pneumonia infection, concern for PE  · Septic shock due to viral pneumonia, possible bacterial superinfection  · COVID-19 positive: 2021  · IV antibiotics, IV steroids initiated on 2021   · Convalescent plasma given: 2021    · Remdesivir: 21  · Oxygen requirement today: ventilated now  · On pressors  · Check US dvt, start on  Heparin infusion, CTA cannot be done to creat, Ddimer worsening today, AC was on hold,(not sure indication) was on prophylactic AB  · Pulmonology on board  · Consult ID      · A. Fib, rate controlled  Cardiology was consulted for possible junctional bradycardia, assessed as slow Afib  Was started on dopamine  Cardiology on board    · ANASTASIA, likely prerenal, worsening  Check BNP, nephrotoxic on hold , consult nephrology    · Diabetes mellitus type 2, poor control, sliding scale, Lantus, hypoglycemic protocol, endocrine on board     · Nutrition  On tube feeds, dietitian on board    Chronic medical conditions  · HFrEF , not in exacerbation  · Morbid obesity with BMI of 47, encourage weight loss  · HTN, medications on hold due to hypotension  · HLD, fenofibrate  · Chronic anxiety disorder, valium  · Hypothyroidism, synthroid       Called Angel, updated on care and course  Diet DIET TUBE FEED CONTINUOUS/CYCLIC NPO; Low Calorie High Protein (Vital HP);  Nasogastric; Continuous; 10; 65; 24   DVT Prophylaxis [] Lovenox, []  Heparin, [] SCDs, []No VTE prophylaxis, patient ambulating   GI Prophylaxis [] PPI, [] H2 Blocker, [] No GI prophylaxis, patient is receiving diet/Tube Feeds Code Status Full Code   Disposition Patient requires continued admission due to    MDM [] Low, [] Moderate,[]  High  Patient's risk as above due to      History of Present Illness:     Pt S&E. Intubated, sedated  10-14 point ROS reviewed negative, unless as noted above    Objective: Intake/Output Summary (Last 24 hours) at 1/19/2021 1345  Last data filed at 1/19/2021 0800  Gross per 24 hour   Intake 1686.82 ml   Output 665 ml   Net 1021.82 ml      Vitals:   Vitals:    01/19/21 1300   BP: (!) 107/53   Pulse: 65   Resp: 14   Temp: 98.4 °F (36.9 °C)   SpO2: 95%     Physical Exam:    GEN Sedated, intubated. EYES Pupils are equally round. No scleral erythema, discharge, or conjunctivitis. HENT Mucous membranes are moist.  ET tube  NECK No apparent thyromegaly or masses. NG tube in situ  RESP crackles bilateral diminished BS, no wheezes, rales or rhonchi. Symmetric chest movement . CARDIO/VASC S1/S2 auscultated. Regular rate without appreciable murmurs, rubs, or gallops. Peripheral pulses equal bilaterally and palpable. No peripheral edema. GI Abdomen is soft without significant tenderness, masses, or guarding. Bowel sounds are normoactive. Rectal exam deferred.  Alan catheter is present. HEME/LYMPH No petechiae or ecchymoses. MSK No gross joint deformities. Spontaneous movement of all extremities  SKIN Normal coloration, warm, dry.   NEURO Cranial nerves appear grossly intact,On Vent    Medications:   Medications:    heparin (porcine)  80 Units/kg Intravenous Once    vancomycin (VANCOCIN) intermittent dosing (placeholder)   Other RX Placeholder    metoclopramide  10 mg Intravenous TID    enoxaparin  40 mg Subcutaneous Daily    insulin NPH  30 Units Subcutaneous QAM    insulin glargine  50 Units Subcutaneous Nightly    albuterol sulfate HFA  4 puff Inhalation Q4H    And    ipratropium  4 puff Inhalation Q4H    insulin lispro  0-36 Units Subcutaneous Q4H    chlorhexidine  15 mL Mouth/Throat BID    famotidine (PEPCID) injection  20 mg Intravenous BID    miconazole   Topical BID    cefepime  2 g Intravenous Q12H    [Held by provider] apixaban  5 mg Oral BID    sodium chloride flush  10 mL Intravenous 2 times per day    sodium chloride flush  10 mL Intravenous 2 times per day    [Held by provider] amLODIPine  10 mg Oral Daily    [Held by provider] atorvastatin  10 mg Oral Daily    [Held by provider] chlorthalidone  25 mg Oral Daily    [Held by provider] ezetimibe  10 mg Oral Daily    fenofibrate  160 mg Oral Daily    [Held by provider] folic acid  1 mg Oral Daily    levothyroxine  75 mcg Oral Daily    [Held by provider] mesalamine  800 mg Oral TID    [Held by provider] losartan  100 mg Oral Daily    [Held by provider] pantoprazole  40 mg Oral QAM AC    aspirin  81 mg Oral Daily      Infusions:    heparin (PORCINE) Infusion      norepinephrine Stopped (01/16/21 0500)    fentanyl 75 mcg/hr (01/19/21 0828)    propofol 25 mcg/kg/min (01/19/21 1026)    dextrose      DOPamine 9 mcg/kg/min (01/19/21 1028)     PRN Meds:     heparin (porcine), 80 Units/kg, PRN      heparin (porcine), 40 Units/kg, PRN      polyvinyl alcohol, 1 drop, Q4H PRN    And      artificial tears, , Q4H PRN      sodium chloride flush, 10 mL, PRN      glucose, 15 g, PRN      dextrose, 12.5 g, PRN      glucagon (rDNA), 1 mg, PRN      dextrose, 100 mL/hr, PRN      albuterol sulfate HFA, 2 puff, Q6H PRN      sodium chloride flush, 10 mL, PRN      promethazine, 12.5 mg, Q6H PRN    Or      ondansetron, 4 mg, Q6H PRN      polyethylene glycol, 17 g, Daily PRN      acetaminophen, 650 mg, Q6H PRN    Or      acetaminophen, 650 mg, Q6H PRN      guaiFENesin-dextromethorphan, 5 mL, Q4H PRN      diazePAM, 5 mg, TID PRN      sodium chloride, 30 mL, PRN        Data    Recent Labs     01/17/21  0540 01/18/21  0530 01/19/21  0600   WBC 10.3 11.0* 11.4*   HGB 10.7* 10.3* 10.6*   HCT 32.2* 30.9* 31.3*    845 553 Recent Labs     01/17/21  0540 01/18/21  0530   NA  --  124*   K  --  4.8   CL  --  88*   CO2  --  22   BUN  --  82*   CREATININE 2.0* 2.2*     No results for input(s): AST, ALT, ALB, BILIDIR, BILITOT, ALKPHOS in the last 72 hours. No results for input(s): INR in the last 72 hours. No results for input(s): CKTOTAL, CKMB, CKMBINDEX, TROPONINI in the last 72 hours.         Electronically signed by Esteban Tate MD on 1/19/2021 at 1:45 PM

## 2021-01-19 NOTE — PROGRESS NOTES
Tube feeding started @ 10mL/hr per order at this time. Will monitor pt closely for signs of intolerance and will increase feeding rate by 10mL in 8 hours per order.

## 2021-01-19 NOTE — PROGRESS NOTES
Dr Skylar Silva was at bedside, vent settings still too high for wean today. Will continue to monitor pt closely.

## 2021-01-20 PROBLEM — J96.01 ACUTE RESPIRATORY FAILURE WITH HYPOXIA (HCC): Status: ACTIVE | Noted: 2021-01-01

## 2021-01-20 PROBLEM — N17.9 AKI (ACUTE KIDNEY INJURY) (HCC): Status: ACTIVE | Noted: 2021-01-01

## 2021-01-20 PROBLEM — U07.1 PNEUMONIA DUE TO SEVERE ACUTE RESPIRATORY SYNDROME CORONAVIRUS 2 (SARS-COV-2): Status: ACTIVE | Noted: 2021-01-01

## 2021-01-20 PROBLEM — J12.82 PNEUMONIA DUE TO SEVERE ACUTE RESPIRATORY SYNDROME CORONAVIRUS 2 (SARS-COV-2): Status: ACTIVE | Noted: 2021-01-01

## 2021-01-20 NOTE — PROGRESS NOTES
Pt has double lumen PICC and 1 peripheral IV in left forearm. Both lumens of PICC have medications infusing and pt has order for sodium bicarb as well as vaprisol ordered to be run as well. Unable to run these together and called Dr. Leonel Richmond regarding this and received order to just infuse vaprisol at this time.

## 2021-01-20 NOTE — PROGRESS NOTES
Spoke with pt son Fortino Caballero at this time, given update on his fathers condition and all questions answered. No other needs verbalized at this time.

## 2021-01-20 NOTE — PROGRESS NOTES
Hospitalist Progress Note      Name:  Hazel Simmons /Age/Sex: 1945  (67 y.o. male)   MRN & CSN:  [id number] & [id number] Admission Date/Time: 2021  4:54 PM   Location:  - PCP: Oneida Katz MD         Hospital Day: 13    Assessment and Plan:   Hazel Simmons is a 67 y.o.  male  who presents with shortness of breath. · Acute respiratory failure with hypoxia secondary to COVID-19 viral pneumonia infection, concern for PE  · Septic shock due to viral pneumonia, possible bacterial superinfection  · COVID-19 positive: 2021  · IV antibiotics, IV steroids initiated on 2021   · Convalescent plasma given: 2021    · Remdesivir: 21  · Oxygen requirement today: ventilated now  · On pressors  · Ultrasound DVT negative, DC heparin infusion, continue prophylactic  · Pulmonology on board  · ID, continue work on cefepime, switch DEXA to Solu-Medrol      · A. Fib, rate controlled  Cardiology was consulted for possible junctional bradycardia, assessed as slow Afib  Was started on dopamine  Cardiology on board    · ANASTASIA, likely prerenal, worsening  Check BNP, nephrotoxic on hold , nephrology, starting CRRT    · Diabetes mellitus type 2, poor control, sliding scale, Lantus, hypoglycemic protocol, endocrine on board     · Nutrition  On tube feeds, dietitian on board    Chronic medical conditions  · HFrEF , not in exacerbation  · Morbid obesity with BMI of 47, encourage weight loss  · HTN, medications on hold due to hypotension  · HLD, fenofibrate  · Chronic anxiety disorder, valium  · Hypothyroidism, synthroid       Diet DIET TUBE FEED CONTINUOUS/CYCLIC NPO; Low Calorie High Protein (Vital HP);  Nasogastric; Continuous; 10; 65; 24   DVT Prophylaxis [] Lovenox, []  Heparin, [] SCDs, []No VTE prophylaxis, patient ambulating   GI Prophylaxis [] PPI, [] H2 Blocker, [] No GI prophylaxis, patient is receiving diet/Tube Feeds   Code Status Full Code   Disposition Patient requires continued admission due to    MDM [] Low, [] Moderate,[]  High  Patient's risk as above due to      History of Present Illness:     Pt S&E. Intubated, sedated  10-14 point ROS reviewed negative, unless as noted above    Objective: Intake/Output Summary (Last 24 hours) at 1/20/2021 1531  Last data filed at 1/20/2021 0653  Gross per 24 hour   Intake 1862.78 ml   Output 1585 ml   Net 277.78 ml      Vitals:   Vitals:    01/20/21 1512   BP:    Pulse: 64   Resp: 18   Temp:    SpO2: (!) 89%     Physical Exam:    GEN Sedated, intubated. EYES Pupils are equally round. No scleral erythema, discharge, or conjunctivitis. HENT Mucous membranes are moist.  ET tube  NECK No apparent thyromegaly or masses. NG tube in situ  RESP crackles bilateral diminished BS, no wheezes, rales or rhonchi. Symmetric chest movement . CARDIO/VASC S1/S2 auscultated. Regular rate without appreciable murmurs, rubs, or gallops. Peripheral pulses equal bilaterally and palpable. No peripheral edema. GI Abdomen is soft without significant tenderness, masses, or guarding. Bowel sounds are normoactive. Rectal exam deferred.  Alan catheter is present. HEME/LYMPH No petechiae or ecchymoses. MSK No gross joint deformities. Spontaneous movement of all extremities  SKIN Normal coloration, warm, dry.   NEURO Cranial nerves appear grossly intact,On Vent    Medications:   Medications:    insulin glargine  60 Units Subcutaneous Nightly    insulin NPH  40 Units Subcutaneous QAM    heparin (porcine)  5,000 Units Subcutaneous 3 times per day    cefepime  2,000 mg Intravenous Q8H    vancomycin (VANCOCIN) intermittent dosing (placeholder)   Other RX Placeholder    metoclopramide  10 mg Intravenous TID    albuterol sulfate HFA  4 puff Inhalation Q4H    And    ipratropium  4 puff Inhalation Q4H    insulin lispro  0-36 Units Subcutaneous Q4H    chlorhexidine  15 mL Mouth/Throat BID    famotidine (PEPCID) injection  20 mg Intravenous BID    miconazole   Topical BID  [Held by provider] apixaban  5 mg Oral BID    sodium chloride flush  10 mL Intravenous 2 times per day    sodium chloride flush  10 mL Intravenous 2 times per day    [Held by provider] atorvastatin  10 mg Oral Daily    [Held by provider] folic acid  1 mg Oral Daily    levothyroxine  75 mcg Oral Daily    [Held by provider] mesalamine  800 mg Oral TID    [Held by provider] pantoprazole  40 mg Oral QAM AC    aspirin  81 mg Oral Daily      Infusions:    dialysis builder 1,200 mL/hr at 01/20/21 1508    prismaSATE BGK 4/2.5 200 mL/hr (01/20/21 1507)    dialysis builder 600 mL/hr at 01/20/21 1508    IV infusion builder      norepinephrine Stopped (01/16/21 0500)    fentaNYL 5 mcg/hr (01/20/21 0128)    propofol 20 mcg/kg/min (01/20/21 1455)    dextrose      DOPamine 9 mcg/kg/min (01/20/21 1300)     PRN Meds:     potassium chloride, 20 mEq, PRN      magnesium sulfate, 1,000 mg, PRN      calcium gluconate, 1 g, PRN    Or      calcium gluconate, 2 g, PRN    Or      calcium gluconate, 3 g, PRN    Or      calcium gluconate, 4 g, PRN      sodium phosphate IVPB, 6 mmol, PRN    Or      sodium phosphate IVPB, 12 mmol, PRN    Or      sodium phosphate IVPB, 18 mmol, PRN    Or      sodium phosphate IVPB, 24 mmol, PRN      acetaminophen, 650 mg, Q6H PRN    Or      acetaminophen, 650 mg, Q6H PRN      heparin (porcine), 10,000 Units, PRN      heparin (porcine), 5,000 Units, PRN      polyvinyl alcohol, 1 drop, Q4H PRN    And      artificial tears, , Q4H PRN      sodium chloride flush, 10 mL, PRN      glucose, 15 g, PRN      dextrose, 12.5 g, PRN      glucagon (rDNA), 1 mg, PRN      dextrose, 100 mL/hr, PRN      albuterol sulfate HFA, 2 puff, Q6H PRN      sodium chloride flush, 10 mL, PRN      promethazine, 12.5 mg, Q6H PRN    Or      ondansetron, 4 mg, Q6H PRN      polyethylene glycol, 17 g, Daily PRN      guaiFENesin-dextromethorphan, 5 mL, Q4H PRN      diazePAM, 5 mg, TID PRN      sodium chloride, 30 mL, PRN        Data    Recent Labs     01/18/21  0530 01/19/21  0600 01/20/21  0830   WBC 11.0* 11.4* 15.3*   HGB 10.3* 10.6* 10.2*   HCT 30.9* 31.3* 29.2*    262 251      Recent Labs     01/18/21  0530 01/19/21  1423 01/20/21  0830   * 115* 119*   K 4.8 5.3* 6.7*   CL 88* 80* 79*   CO2 22 17* 18*   PHOS  --   --  10.4*   BUN 82* 91* 110*   CREATININE 2.2* 2.9* 3.9*     No results for input(s): AST, ALT, ALB, BILIDIR, BILITOT, ALKPHOS in the last 72 hours. No results for input(s): INR in the last 72 hours. No results for input(s): CKTOTAL, CKMB, CKMBINDEX, TROPONINI in the last 72 hours.         Electronically signed by Rusty Roman MD on 1/20/2021 at 3:31 PM

## 2021-01-20 NOTE — PROGRESS NOTES
Pt on vent and not able give consent  I dw poa son today and explained risk and benefit with line hd and dialysis and he is aware and agree to proceed  I dw IR and they could not reach son and I gave consent as medical necessity.     Thank you

## 2021-01-20 NOTE — PROGRESS NOTES
PROCEDURE PERFORMED: Temp dialysis catheter by Dr Zeyad Jenkins:  Dialysis, ANASTASIA    INFORMED CONSENT:  PT on vent, unable to consent. Consent obtained with pt son, [place]. Consent placed in chart. TIME OUT COMPLETE: 8832    BARRIER PRECAUTIONS & STERILE TECHNIQUE  Pt supine in bed; prepped and draped in a sterile fashion with chlorhexadine.      PAIN/LOCAL ANESTHESIA/SEDATION MANAGEMENT:  Lidocaine    INTRAOPERATIVE:    ACCESS TIME: 0930   US/FLUORO: U/S guided    STERILE DRESSINGS:  Applied by Travon Right RT    COMPLICATIONS:  None    FOLLOW-UP X-RAY: ordered    OUTCOME:  Catheter placed right IJ; pt tolerated procedure well    STAFF PRESENT DURING PROCEDURE:   Erin Powell RN, Elaine Rodriguez RN, Travon Right RT    REPORT CALLED TO: Mandie Rizo

## 2021-01-20 NOTE — PROGRESS NOTES
[id number] [facility]  consulted by Dr. Ar Connell for monitoring and adjustment. Indication for treatment: Pneumonia  Goal trough: 15 mcg/mL, AUC/LOGAN 400-600    Pertinent Laboratory Values:   Temp Readings from Last 3 Encounters:   01/20/21 98.1 °F (36.7 °C) (Rectal)     Recent Labs     01/18/21  0530 01/19/21  0600 01/20/21  0830   WBC 11.0* 11.4* 15.3*     Recent Labs     01/18/21  0530 01/19/21  1423 01/20/21  0830   BUN 82* 91* 110*   CREATININE 2.2* 2.9* 3.9*     Estimated Creatinine Clearance: 25 mL/min (A) (based on SCr of 3.9 mg/dL (H)). Intake/Output Summary (Last 24 hours) at 1/20/2021 1233  Last data filed at 1/20/2021 0653  Gross per 24 hour   Intake 1862.78 ml   Output 1585 ml   Net 277.78 ml       Pertinent Cultures:  Date    Source    Results  1/8/21   Covid-19   Positive  1/8/21   Blood    Negative  1/11/21  MRSA nasal   Negative  1/13/21  Respiratory   Negative    Vancomycin level:   TROUGH:    No results for input(s): VANCOTROUGH in the last 72 hours.   RANDOM:    Recent Labs     01/18/21  0530 01/19/21  0600 01/20/21  0830   VANCORANDOM 27.5 22.8 20.5       Assessment:  · WBC and temperature: WBC remains elevated @ 15.3 (on Methylprednisolone); patient is afebrile today  · SCr, BUN, and urine output:  · ANASTASIA, Scr continues upward trend @ 3.9  · Low UOP data  · Baseline Scr 1.1  · Day(s) of therapy: # 10  · Vancomycin concentration:  · 1/14: 9.7, collected after 3 doses, slightly below goal  · 1/18: 27.5, supra-therapeutic, 10h post-dose  · 1/19: 22.8, supra-therapeutic, 45h post-dose  · 1/20:  20.5, borderline therapeutic, ~72h post-dose    Plan:  · Continue intermittent vancomycin dosing per levels 2/2 ANASTASIA  · Random level therapeutic @ 20.5  · Will continue to hold Vancomycin for now  · Repeat random level tomorrow AM  · Patient scheduled to receive dialysis today:  [facility]   · MRSA nasal screen is negative, can consider de-escalation for indication of MRSA pneumonia

## 2021-01-20 NOTE — PROGRESS NOTES
Progress Note( Dr. Jasmina Franco)  1/20/2021  Subjective:   Admit Date: 1/8/2021  PCP: Sara Wolff MD    Admitted For :Hypoxia, shortness of breath, Covid pneumonia    Consulted For: Better control of blood glucose    Interval History: Patient is respiratory status got worse was sedated intubated and placed on ventilator on 1/13/2021  patient's blood glucose are better       Intake/Output Summary (Last 24 hours) at 1/20/2021 0013  Last data filed at 1/19/2021 2222  Gross per 24 hour   Intake 2520.27 ml   Output 1280 ml   Net 1240.27 ml       DATA    CBC:   Recent Labs     01/17/21  0540 01/18/21  0530 01/19/21  0600   WBC 10.3 11.0* 11.4*   HGB 10.7* 10.3* 10.6*    286 262    CMP:  Recent Labs     01/17/21  0540 01/18/21  0530 01/19/21  1423   NA  --  124* 115*   K  --  4.8 5.3*   CL  --  88* 80*   CO2  --  22 17*   BUN  --  82* 91*   CREATININE 2.0* 2.2* 2.9*   CALCIUM  --  7.6* 7.5*     Lipids:   Lab Results   Component Value Date    CHOL 115 10/29/2012    HDL 39 10/29/2012    TRIG 80 10/29/2012     Glucose:  Recent Labs     01/19/21  1304 01/19/21  1641 01/19/21 2010   POCGLU 158* 144* 186*     [id number]:No results found for: LABA1C  High Sensitivity TSH:   Lab Results   Component Value Date    TSHHS 2.320 10/29/2012     Free T3: No results found for: FT3  Free T4:  Lab Results   Component Value Date    T4FREE 0.89 10/29/2012       Xr Chest Portable    Result Date: 1/8/2021  EXAMINATION: ONE XRAY VIEW OF THE CHEST 1/8/2021 6:12 pm COMPARISON: 02/17/2014 radiograph HISTORY: ORDERING SYSTEM PROVIDED HISTORY: sob \       No acute finding in the chest.     Ct Chest Pulmonary Embolism W Contrast    Result Date: 1/9/2021  EXAMINATION: CTA OF THE CHEST 1/9/2021 4:02 pm       1. No pulmonary embolism. 2. Pulmonary parenchymal findings typical of mild COVID-19 pneumonia.   Note that CT pulmonary findings of COVID-19 show overlap with other disease entities including H1N1 influenza, other viral pneumonia (i.e. adenovirus, CMV), organizing pneumonia, alveolar proteinosis and acute interstitial pneumonitis. 3. Trace bilateral pleural effusion. 4. Emphysema. 5. Calcific atherosclerosis aorta and coronary arteries. 6. Mild mediastinal and right hilar lymph node enlargement is almost certainly reactive. This should be followed with IV contrast-enhanced CT chest in 3 months to ensure stability.        Scheduled Medicines   Medications:    methylPREDNISolone  40 mg Intravenous Q6H    furosemide  40 mg Intravenous TID    vancomycin (VANCOCIN) intermittent dosing (placeholder)   Other RX Placeholder    metoclopramide  10 mg Intravenous TID    insulin NPH  30 Units Subcutaneous QAM    insulin glargine  50 Units Subcutaneous Nightly    albuterol sulfate HFA  4 puff Inhalation Q4H    And    ipratropium  4 puff Inhalation Q4H    insulin lispro  0-36 Units Subcutaneous Q4H    chlorhexidine  15 mL Mouth/Throat BID    famotidine (PEPCID) injection  20 mg Intravenous BID    miconazole   Topical BID    cefepime  2 g Intravenous Q12H    [Held by provider] apixaban  5 mg Oral BID    sodium chloride flush  10 mL Intravenous 2 times per day    sodium chloride flush  10 mL Intravenous 2 times per day    [Held by provider] atorvastatin  10 mg Oral Daily    [Held by provider] folic acid  1 mg Oral Daily    levothyroxine  75 mcg Oral Daily    [Held by provider] mesalamine  800 mg Oral TID    [Held by provider] pantoprazole  40 mg Oral QAM AC    aspirin  81 mg Oral Daily      Infusions:    heparin (PORCINE) Infusion 11.5 Units/kg/hr (01/19/21 2050)    IV infusion builder Stopped (01/19/21 2008)    conivaptan 20 mg/day (01/19/21 2002)    norepinephrine Stopped (01/16/21 0500)    fentanyl 75 mcg/hr (01/19/21 0828)    propofol 20 mcg/kg/min (01/19/21 2348)    dextrose      DOPamine 8 mcg/kg/min (01/19/21 8715)         Objective:   Vitals: /60   Pulse 63   Temp 99 °F (37.2 °C) (Rectal)   Resp 15   Ht 5' 11.65\" (1.82 m)   Wt (!) 341 lb 14.9 oz (155.1 kg)   SpO2 96%   BMI 46.82 kg/m²   General appearance: Sedated, intubated and on ventilator   neck: no JVD or bruit  Thyroid : Normal lobes   Lungs: Has Vesicular Breath sounds has some wheezing and some rales intubated and on ventilator  Heart:  regular rate and rhythm  Abdomen: soft, non-tender; bowel sounds normal; no masses,  no organomegaly  Musculoskeletal: Normal  Extremities: extremities normal, , no edema  Neurologic: Sedated, intubated and on ventilator    Assessment:     Patient Active Problem List:     COVID-19       Pneumonia        Diabetes mellitus        Hypertension        Hyperlipidemia        Obstructive sleep apnea        Obesity      Plan:     1. Reviewed POC blood glucose . Labs and X ray results   2. Reviewed Current Medicines   3. On  Correction bolus Humalog/ Basal Lantus Insulin regime and also NPH insulin  4. Monitor Blood glucose frequently   5. Modified  the dose of Insulin/ other medicines as needed   6. Will follow     .      Martine Johnson MD

## 2021-01-20 NOTE — PROGRESS NOTES
Nephrology Progress Note  1/20/2021 12:35 PM  Subjective: Interval History: Jennifer Goncalves is a 67 y.o. male with vent and low uop and low bp,. dw son and will start on crrt today,.          Data:   Scheduled Meds:   insulin glargine  60 Units Subcutaneous Nightly    insulin NPH  40 Units Subcutaneous QAM    heparin (porcine)  5,000 Units Subcutaneous 3 times per day    methylPREDNISolone  40 mg Intravenous Q6H    furosemide  40 mg Intravenous TID    vancomycin (VANCOCIN) intermittent dosing (placeholder)   Other RX Placeholder    metoclopramide  10 mg Intravenous TID    albuterol sulfate HFA  4 puff Inhalation Q4H    And    ipratropium  4 puff Inhalation Q4H    insulin lispro  0-36 Units Subcutaneous Q4H    chlorhexidine  15 mL Mouth/Throat BID    famotidine (PEPCID) injection  20 mg Intravenous BID    miconazole   Topical BID    cefepime  2 g Intravenous Q12H    [Held by provider] apixaban  5 mg Oral BID    sodium chloride flush  10 mL Intravenous 2 times per day    sodium chloride flush  10 mL Intravenous 2 times per day    [Held by provider] atorvastatin  10 mg Oral Daily    [Held by provider] folic acid  1 mg Oral Daily    levothyroxine  75 mcg Oral Daily    [Held by provider] mesalamine  800 mg Oral TID    [Held by provider] pantoprazole  40 mg Oral QAM AC    aspirin  81 mg Oral Daily     Continuous Infusions:   dialysis builder      prismaSATE BGK 4/2.5      dialysis builder      conivaptan 20 mg/day (01/19/21 2002)    norepinephrine Stopped (01/16/21 0500)    fentaNYL 5 mcg/hr (01/20/21 0128)    propofol 20 mcg/kg/min (01/20/21 1057)    dextrose      DOPamine 9 mcg/kg/min (01/20/21 0658)           CBC:   Recent Labs     01/18/21  0530 01/19/21  0600 01/20/21  0830   WBC 11.0* 11.4* 15.3*   HGB 10.3* 10.6* 10.2*    262 251     BMP:    Recent Labs     01/18/21  0530 01/19/21  1423 01/20/21  0830   * 115* 119*   K 4.8 5.3* 6.7*   CL 88* 80* 79*   CO2 22 17* 18*

## 2021-01-20 NOTE — PROGRESS NOTES
pulmonary      SUBJECTIVE:  Intubated on vent     OBJECTIVE    VITALS:  BP (!) 141/66   Pulse 56   Temp 98.7 °F (37.1 °C) (Rectal)   Resp 15   Ht 5' 11.65\" (1.82 m)   Wt (!) 341 lb 14.9 oz (155.1 kg)   SpO2 95%   BMI 46.82 kg/m²   HEAD AND FACE EXAM:  No throat injection, no active exudate,no thrush  NECK EXAM;No JVD, no masses, symmetrical  CHEST EXAM; Expansion equal and symmetrical, no masses  LUNG EXAM; Good breath sounds bilaterally. There are expiratory wheezes both lungs, there are crackles at both lung bases  CARDIOVASCULAR EXAM: Positive S1 and S2, no S3 or S4, no clicks ,no murmurs  RIGHT AND LEFT LOWER EXTRIMITY EXAM: No edema, no swelling, no inflamation            LABS   Lab Results   Component Value Date    WBC 11.4 (H) 01/19/2021    HGB 10.6 (L) 01/19/2021    HCT 31.3 (L) 01/19/2021    MCV 94.3 01/19/2021     01/19/2021     Lab Results   Component Value Date    CREATININE 2.9 (H) 01/19/2021    BUN 91 (H) 01/19/2021     (LL) 01/19/2021    K 5.3 (H) 01/19/2021    CL 80 (L) 01/19/2021    CO2 17 (L) 01/19/2021     Lab Results   Component Value Date    INR 1.02 01/08/2021    PROTIME 13.3 01/08/2021        No results found for: PHOS     Recent Labs     01/18/21  0600 01/19/21  0600 01/20/21  0600   PH 7.34 7.33* 7.28*   PO2ART 64* 49* 86   [id number] 44.0 39.0 39.0   O2SAT 91.1* 81.0* 94.6*         Wt Readings from Last 3 Encounters:   01/10/21 (!) 341 lb 14.9 oz (155.1 kg)               ASSESMENT  Ac resp failure  abraham pneumonia  covid pneumonuia        PLAN  1. cpm  2. Cont full vent support  3.  He is currently on 70% fio2 and peep 5 so not weanable at present  4. cxr is pending    1/20/2021  Yaquelin Lawson M.D.

## 2021-01-21 NOTE — PROGRESS NOTES
pulmonary      SUBJECTIVE: intubated on vent     OBJECTIVE    VITALS:  /62   Pulse 83   Temp 97.3 °F (36.3 °C)   Resp 16   Ht 5' 11.65\" (1.82 m)   Wt (!) 341 lb 14.9 oz (155.1 kg)   SpO2 93%   BMI 46.82 kg/m²   HEAD AND FACE EXAM:  No throat injection, no active exudate,no thrush  NECK EXAM;No JVD, no masses, symmetrical  CHEST EXAM; Expansion equal and symmetrical, no masses  LUNG EXAM; Good breath sounds bilaterally.  There are expiratory wheezes both lungs, there are crackles at both lung bases  CARDIOVASCULAR EXAM: Positive S1 and S2, no S3 or S4, no clicks ,no murmurs  RIGHT AND LEFT LOWER EXTRIMITY EXAM: No edema, no swelling, no inflamation            LABS   Lab Results   Component Value Date    WBC 20.4 (H) 01/21/2021    HGB 10.3 (L) 01/21/2021    HCT 29.7 (L) 01/21/2021    MCV 91.1 01/21/2021     01/21/2021     Lab Results   Component Value Date    CREATININE 2.9 (H) 01/21/2021    BUN 86 (H) 01/21/2021     (L) 01/21/2021    K 5.6 (HH) 01/21/2021    CL 87 (L) 01/21/2021    CO2 19 (L) 01/21/2021     Lab Results   Component Value Date    INR 1.02 01/08/2021    PROTIME 13.3 01/08/2021          Lab Results   Component Value Date    PHOS 6.7 01/21/2021    PHOS 8.9 01/20/2021    PHOS 10.4 01/20/2021        Recent Labs     01/19/21  0600 01/20/21  0600 01/21/21  0600   PH 7.33* 7.28* 7.29*   PO2ART 49* 86 67*   [id number] 39.0 39.0 41.0   O2SAT 81.0* 94.6* 90.1*         Wt Readings from Last 3 Encounters:   01/10/21 (!) 341 lb 14.9 oz (155.1 kg)        EXAMINATION:   ONE XRAY VIEW OF THE CHEST       1/21/2021 10:32 am       COMPARISON:   01/21/2021       HISTORY:   ORDERING SYSTEM PROVIDED HISTORY: ETT placement advanced per suggestion of   Radiologist, recheck after advancement   TECHNOLOGIST PROVIDED HISTORY:   Reason for exam:->ETT placement advanced per suggestion of Radiologist,   recheck after advancement   Reason for Exam: Ett placement advanced       FINDINGS:   The endotracheal tube measures 6.6 cm above the jalil.  Median sternotomy   wires are identified.  The patient is rotated to the right. Balinda Calico is a right   IJ approach central venous catheter with the tip overlying the cavoatrial   junction, as well as a right-sided PICC with the tip overlying the same   region.  The patient is rotated to the right. Deleta Salt noted is multifocal   parenchymal infiltrates seen throughout the lungs bilaterally.  Osseous   structures appear stable.  Possible trace right pleural effusion at the   costophrenic angle.           Impression   Endotracheal tube placement now lies 6.6 cm above the jalil.       Similar appearing parenchymal infiltrates seen diffusely, with possible trace   right pleural effusion at the costophrenic angle.                     ASSESMENT  Ac resp failure  covid pneumonia  abraham pneumonia        PLAN  1. cpm  2. Continue full vent support  3.  Advance et tube 2 cm upto 28 cm at lips  4.     1/21/2021  Ramona Raygoza M.D.

## 2021-01-21 NOTE — PROGRESS NOTES
This note also relates to the following rows which could not be included:  SpO2 - Cannot attach notes to unvalidated device data  Pulse - Cannot attach notes to unvalidated device data  Resp - Cannot attach notes to unvalidated device data       01/21/21 0526   Vent Information   Vent Type 980   Vent Mode AC/VC+   Vt Ordered 550 mL   Rate Set 16 bmp   Peak Flow 0 L/min   Pressure Support 0 cmH20   FiO2  90 %   Sensitivity 3   PEEP/CPAP 10   I Time/ I Time % 1 s   Humidification Source HME   Vent Patient Data   High Peep/I Pressure 0   Peak Inspiratory Pressure 25 cmH2O   Mean Airway Pressure 16 cmH20   Rate Measured 19 br/min   Vt Exhaled 726 mL   Minute Volume 10.5 Liters   I:E Ratio 1:2.60   Cough/Sputum   Sputum How Obtained Endotracheal;Suctioned   $Obtained Sample $Nasotracheal Suction   Cough Productive   Sputum Amount Small   Sputum Color Creamy; Tan   Tenacity Thick   Breath Sounds   Right Upper Lobe Diminished   Right Middle Lobe Diminished   Right Lower Lobe Diminished   Left Upper Lobe Diminished   Left Lower Lobe Diminished   Alarm Settings   High Pressure Alarm 40 cmH2O   Delay Alarm 20 sec(s)   Low Minute Volume Alarm 2.5 L/min   Apnea (secs) 20 secs   High Respiratory Rate 40 br/min   Low Exhaled Vt  250 mL   ETT (adult)   Placement Date/Time: 01/13/21 (c) 7287   Preoxygenation: Yes  Mask Ventilation: Ventilated by mask (1)  Technique: Video laryngoscopy  Tube Size: 7.5 mm  Laryngoscope: Mac  Blade Size: 4  Grade View: Full view of the glottis  Insertion attempts: 1  Pl. ..    Secured at 26 cm   Measured From Lips   ET Placement Left   Secured By Commercial tube matthew   Site Condition Dry   Cuff Pressure   (mlt)

## 2021-01-21 NOTE — PROGRESS NOTES
[address] [facility]  consulted by Dr. Donovan Arauz for monitoring and adjustment. Indication for treatment: COVID 19, possible secondary bacterial infection  Goal trough: 15 mcg/mL, AUC/LOGAN 400-600    Pertinent Laboratory Values:   Temp Readings from Last 3 Encounters:   01/21/21 97.3 °F (36.3 °C)     Recent Labs     01/19/21  0600 01/20/21  0830 01/21/21  0525   WBC 11.4* 15.3* 20.4*   LACTATE  --   --  1.0     Recent Labs     01/20/21  0830 01/20/21  1815 01/21/21  0525   * 104* 86*   CREATININE 3.9* 3.4* 2.9*     Estimated Creatinine Clearance: 33 mL/min (A) (based on SCr of 2.9 mg/dL (H)). Intake/Output Summary (Last 24 hours) at 1/21/2021 1025  Last data filed at 1/21/2021 1013  Gross per 24 hour   Intake 2801.01 ml   Output 4267 ml   Net -1465.99 ml       Pertinent Cultures:  Date    Source    Results  1/8/21   Covid-19   Positive  1/8/21   Blood    Negative  1/11/21  MRSA nasal   Negative  1/13/21  Respiratory   Negative    Vancomycin level:   TROUGH:    No results for input(s): VANCOTROUGH in the last 72 hours. RANDOM:    Recent Labs     01/19/21  0600 01/20/21  0830 01/21/21  0525   VANCORANDOM 22.8 20.5 15.4     Assessment:  · WBC and temperature: WBC trends up (receiving methylprednisolone); afebrile  · SCr, BUN, and urine output: ANASTASIA, CRRT continues   · Day(s) of therapy: # 11  · Vancomycin concentration:  · 1/18: 27.5, supra-therapeutic, 10h post-dose  · 1/19: 22.8, supra-therapeutic, 45h post-dose  · 1/20:  20.5, above goal, 72h post-dose  · 1/21: 15.4, after CRRT initiation     Plan:  · Pulse vancomycin dosing per levels 2/2 ANASTASIA and RRT   · Level is therapeutic after clearance with CRRT   · Will order vancomycin 2000 mg q24h x 2 doses   · Adjust back to pulse dosing if CRRT paused or stopped   · Pharmacy will continue to monitor patient and adjust therapy as indicated    [address] 1/23 @ 1200     Thank you for the consult.   Skylar Shepherd Ronelle Lombard, PharmD, BCPS   1/21/2021  10:25 AM

## 2021-01-21 NOTE — PROGRESS NOTES
Infectious Disease Progress Note  2021   Patient Name: Migue Lazo : 1945       Reason for visit: F/u COVID-19 pneumonia, acute respiratory failure? History:? Interval history noted  Intubated, sedated and on mechanical ventilation  Physical Exam:  Vital Signs: /62   Pulse 83   Temp 97.3 °F (36.3 °C)   Resp 16   Ht 5' 11.65\" (1.82 m)   Wt (!) 341 lb 14.9 oz (155.1 kg)   SpO2 93%   BMI 46.82 kg/m²     Gen: intubated and sedated  Skin: no stigmata of endocarditis  Wounds: C/D/I  HEMT: AT/NC ETT  Eyes: PERRLA. Neck: Supple. Trachea midline. No LAD. Chest:  transmitted breath sounds. Heart:   Abd: soft, non-distended, no tenderness, no hepatomegaly. Normoactive bowel sounds. Ext: no clubbing, cyanosis, or edema  Catheter Site: without erythema or tenderness  LDA: right arm PICC line, Urethral catheter:  Neuro: sedated and on the ventilator. Radiologic / Imaging / TESTING    Chest x-ray 2021   impression:  Endotracheal tube placement now lies 6.6 cm above the jalil. Similar appearing parenchymal infiltrates seen diffusely, with possible trace   right pleural effusion at the costophrenic angle.         Labs:    Recent Results (from the past 24 hour(s))   Calcium, Ionized    Collection Time: 21  5:20 PM   Result Value Ref Range    Ionized Ca 0.83 (L) 1.12 - 1.32 mMOL/L    Calcium, Ion 3.32 (L) 4.48 - 5.28 MG/DL   POCT Glucose    Collection Time: 21  5:48 PM   Result Value Ref Range    POC Glucose 195 (H) 70 - 99 MG/DL   Magnesium    Collection Time: 21  6:15 PM   Result Value Ref Range    Magnesium 2.9 (H) 1.8 - 2.4 mg/dl   Phosphorus    Collection Time: 21  6:15 PM   Result Value Ref Range    Phosphorus 8.9 (H) 2.5 - 4.9 MG/DL   Comprehensive Metabolic Panel w/ Reflex to MG    Collection Time: 21  6:15 PM   Result Value Ref Range    Sodium 120 (L) 135 - 145 MMOL/L    Potassium 6.2 (HH) 3.5 - 5.1 MMOL/L    Chloride 81 (L) 99 - 110 mMol/L    CO2 18 (L) 21 - 32 MMOL/L     (H) 6 - 23 MG/DL    CREATININE 3.4 (H) 0.9 - 1.3 MG/DL    Glucose 180 (H) 70 - 99 MG/DL    Calcium 7.5 (L) 8.3 - 10.6 MG/DL    Alb 2.6 (L) 3.4 - 5.0 GM/DL    Total Protein 6.0 (L) 6.4 - 8.2 GM/DL    Total Bilirubin 1.5 (H) 0.0 - 1.0 MG/DL    ALT 15 10 - 40 U/L    AST 31 15 - 37 IU/L    Alkaline Phosphatase 46 40 - 129 IU/L    GFR Non- 18 (L) >60 mL/min/1.73m2    GFR  21 (L) >60 mL/min/1.73m2    Anion Gap 21 (H) 4 - 16   Calcium, Ionized    Collection Time: 01/20/21  6:15 PM   Result Value Ref Range    Ionized Ca 0.84 (L) 1.12 - 1.32 mMOL/L    Calcium, Ion 3.36 (L) 4.48 - 5.28 MG/DL   POCT Glucose    Collection Time: 01/20/21  9:31 PM   Result Value Ref Range    POC Glucose 191 (H) 70 - 99 MG/DL   Calcium, Ionized    Collection Time: 01/21/21 12:15 AM   Result Value Ref Range    Ionized Ca 0.85 (L) 1.12 - 1.32 mMOL/L    Calcium, Ion 3.40 (L) 4.48 - 5.28 MG/DL   Magnesium    Collection Time: 01/21/21 12:15 AM   Result Value Ref Range    Magnesium 2.6 (H) 1.8 - 2.4 mg/dl   Potassium    Collection Time: 01/21/21 12:15 AM   Result Value Ref Range    Potassium 5.7 (HH) 3.5 - 5.1 MMOL/L   POCT Glucose    Collection Time: 01/21/21 12:17 AM   Result Value Ref Range    POC Glucose 190 (H) 70 - 99 MG/DL   CBC    Collection Time: 01/21/21  5:25 AM   Result Value Ref Range    WBC 20.4 (H) 4.0 - 10.5 K/CU MM    RBC 3.26 (L) 4.6 - 6.2 M/CU MM    Hemoglobin 10.3 (L) 13.5 - 18.0 GM/DL    Hematocrit 29.7 (L) 42 - 52 %    MCV 91.1 78 - 100 FL    MCH 31.6 (H) 27 - 31 PG    MCHC 34.7 32.0 - 36.0 %    RDW 13.7 11.7 - 14.9 %    Platelets 340 556 - 470 K/CU MM    MPV 11.0 7.5 - 11.1 FL   D-Dimer, Quantitative    Collection Time: 01/21/21  5:25 AM   Result Value Ref Range    D-Dimer, Quant 1009 (H) <230 NG/mL(DDU)   Fibrinogen    Collection Time: 01/21/21  5:25 AM   Result Value Ref Range    Fibrinogen 748 (H) 196.9 - 442.1 MG/DL   Procalcitonin    Collection Time: 01/21/21  5:25 AM   Result Value Ref Range    Procalcitonin 1.87    Lactic Acid, Plasma    Collection Time: 01/21/21  5:25 AM   Result Value Ref Range    Lactate 1.0 0.4 - 2.0 mMOL/L   Vancomycin, random    Collection Time: 01/21/21  5:25 AM   Result Value Ref Range    Vancomycin Rm 15.4 UG/ML    DOSE AMOUNT DOSE AMT.  GIVEN - none, random level     DOSE TIME DOSE TIME GIVEN - none, random level    Comprehensive Metabolic Panel w/ Reflex to MG    Collection Time: 01/21/21  5:25 AM   Result Value Ref Range    Sodium 124 (L) 135 - 145 MMOL/L    Potassium 5.6 (HH) 3.5 - 5.1 MMOL/L    Chloride 87 (L) 99 - 110 mMol/L    CO2 19 (L) 21 - 32 MMOL/L    BUN 86 (H) 6 - 23 MG/DL    CREATININE 2.9 (H) 0.9 - 1.3 MG/DL    Glucose 166 (H) 70 - 99 MG/DL    Calcium 7.7 (L) 8.3 - 10.6 MG/DL    Alb 2.5 (L) 3.4 - 5.0 GM/DL    Total Protein 5.7 (L) 6.4 - 8.2 GM/DL    Total Bilirubin 1.2 (H) 0.0 - 1.0 MG/DL    ALT 14 10 - 40 U/L    AST 28 15 - 37 IU/L    Alkaline Phosphatase 44 40 - 129 IU/L    GFR Non- 21 (L) >60 mL/min/1.73m2    GFR  26 (L) >60 mL/min/1.73m2    Anion Gap 18 (H) 4 - 16   Calcium, Ionized    Collection Time: 01/21/21  5:25 AM   Result Value Ref Range    Ionized Ca 0.95 (L) 1.12 - 1.32 mMOL/L    Calcium, Ion 3.80 (L) 4.48 - 5.28 MG/DL   Magnesium    Collection Time: 01/21/21  5:25 AM   Result Value Ref Range    Magnesium 2.7 (H) 1.8 - 2.4 mg/dl   Phosphorus    Collection Time: 01/21/21  5:25 AM   Result Value Ref Range    Phosphorus 6.7 (H) 2.5 - 4.9 MG/DL   High sensitivity CRP    Collection Time: 01/21/21  5:25 AM   Result Value Ref Range    CRP High Sensitivity 188.7 (H) <8.5 mg/L   POCT Glucose    Collection Time: 01/21/21  5:31 AM   Result Value Ref Range    POC Glucose 165 (H) 70 - 99 MG/DL   Blood gas, arterial    Collection Time: 01/21/21  6:00 AM   Result Value Ref Range    pH, Bld 7.29 (L) 7.34 - 7.45    pCO2, Arterial 41.0 32 - 45 MMHG    pO2, Arterial 67 (L) 75 - 100 MMHG    Base Excess 7 (H) 0 - 3.3    HCO3, Arterial 19.7 18 - 23 MMOL/L    CO2 Content 21.0 19 - 24 MMOL/L    O2 Sat 90.1 (L) 96 - 97 %    Carbon Monoxide, Blood 2.0 0 - 5 %    Methemoglobin, Arterial 1.4 <1.5 %    Comment AC16 550 90% 10peep    POCT Glucose    Collection Time: 01/21/21  9:26 AM   Result Value Ref Range    POC Glucose 138 (H) 70 - 99 MG/DL     CULTURE results: Invalid input(s): BLOOD CULTURE,  URINE CULTURE, SURGICAL CULTURE    Diagnosis:  Patient Active Problem List   Diagnosis    COVID-19    Pneumonia due to severe acute respiratory syndrome coronavirus 2 (SARS-CoV-2)    Acute respiratory failure with hypoxia (HCC)    ANASTASIA (acute kidney injury) ([facility]       Active Problems  Principal Problem:    COVID-19  Active Problems:    Pneumonia due to severe acute respiratory syndrome coronavirus 2 (SARS-CoV-2)    Acute respiratory failure with hypoxia (HCC)    ANASTASIA (acute kidney injury) ([facility]  Resolved Problems:    * No resolved hospital problems. *      Impression and plan   Summary and rationale: Patient is a 67 y.o.  male T2DM,CAD, HLD, HTN, AMINA on CPAP, AAA, BMI 46 kg/m2 who was admitted 1/8/2021 for further evaluation and management of SOB that started on 1/6/2021. He had associated, fatigue, myalgia, cough. Tested positive for SARS-CoV-2 by NAAT on 1/8/2021. Rapidly worsened and was intubated on 1/13/2021.  Critical COVID-19 pneumonia, respiratory failure, ANASTASIA, elevated bilirubin, COVID-associated coagulopathy   Clinical status: remains critical, worsening renal function   Therapeutic:  o Ongoing antibiotics:vancomycin, cefepime  o Antiviral agent: remdesivir 1/9-13  o Anti-inflammatory agents:  - Dexamethasone:1/8-19  - Solu-Medrol:1/19  o Completed antibiotics:   o Convalescent plasma receipt:  o Other agents:    Diagnostic:   F/u:BDG, Aspergillus Ag,, IL-6   Other:      Electronically signed by: Electronically signed by Lydia Gee MD on 1/21/2021 at 12:19 PM

## 2021-01-21 NOTE — PROGRESS NOTES
Updated Son, Naomi Lopes. Let him know about the events of the day and that his father is off CRRT. Dr. Viola Kingston to evaluate him in the morning and will call son in the morning.

## 2021-01-21 NOTE — PROGRESS NOTES
Hospitalist Progress Note      Name:  Carly Knox /Age/Sex: 1945  (67 y.o. male)   MRN & CSN:  [id number] & [id number] Admission Date/Time: 2021  4:54 PM   Location:  - PCP: Figueroa Castillo MD         Hospital Day: 14    Assessment and Plan:   Carly Knox is a 67 y.o.  male  who presents with shortness of breath. · Acute respiratory failure with hypoxia secondary to COVID-19 viral pneumonia infection, concern for PE  · Septic shock due to viral pneumonia, possible bacterial superinfection  · COVID-19 positive: 2021  · IV antibiotics, IV steroids initiated on 2021   · Convalescent plasma given: 2021    · Remdesivir: 21  · Oxygen requirement today: ventilated now, still requiring high vent settings  ·   · On pressors  · Ultrasound DVT negative, DC heparin infusion, continue prophylactic  · Pulmonology on board  · ID, continue on cefepime, switch DEXA to Solu-Medrol      · A. Fib, rate controlled  Cardiology was consulted for possible junctional bradycardia, assessed as slow Afib  Was started on dopamine  Cardiology on board    · ANASTASIA, likely prerenal, worsening  Avoid nephrotoxics , nephrology,  CRRT    · Diabetes mellitus type 2, poor control, sliding scale, Lantus, hypoglycemic protocol, endocrine on board     · Nutrition  On tube feeds, dietitian on board    Chronic medical conditions  · HFrEF , not in exacerbation  · Morbid obesity with BMI of 47, encourage weight loss  · HTN, medications on hold due to hypotension  · HLD, fenofibrate  · Chronic anxiety disorder, valium  · Hypothyroidism, synthroid     Prognosis guarded  Diet DIET TUBE FEED CONTINUOUS/CYCLIC NPO; Low Calorie High Protein (Vital HP);  Nasogastric; Continuous; 10; 65; 24   DVT Prophylaxis [] Lovenox, []  Heparin, [] SCDs, []No VTE prophylaxis, patient ambulating   GI Prophylaxis [] PPI, [] H2 Blocker, [] No GI prophylaxis, patient is receiving diet/Tube Feeds   Code Status Full Code   Disposition Patient requires continued admission due to    MDM [] Low, [] Moderate,[]  High  Patient's risk as above due to      History of Present Illness:     Pt S&E. Intubated, sedated  10-14 point ROS reviewed negative, unless as noted above    Objective: Intake/Output Summary (Last 24 hours) at 1/21/2021 1246  Last data filed at 1/21/2021 1111  Gross per 24 hour   Intake 2801.01 ml   Output 4568 ml   Net -1766.99 ml      Vitals:   Vitals:    01/21/21 1130   BP:    Pulse: 83   Resp:    Temp:    SpO2:      Physical Exam:    GEN Sedated, intubated. EYES Pupils are equally round. No scleral erythema, discharge, or conjunctivitis. HENT Mucous membranes are moist.  ET tube  NECK No apparent thyromegaly or masses. NG tube in situ  RESP crackles bilateral diminished BS, no wheezes, rales or rhonchi. Symmetric chest movement . CARDIO/VASC S1/S2 auscultated. Regular rate without appreciable murmurs, rubs, or gallops. Peripheral pulses equal bilaterally and palpable. No peripheral edema. GI Abdomen is soft without significant tenderness, masses, or guarding. Bowel sounds are normoactive. Rectal exam deferred.  Alan catheter is present. HEME/LYMPH No petechiae or ecchymoses. MSK No gross joint deformities. Spontaneous movement of all extremities  SKIN Normal coloration, warm, dry.   NEURO Cranial nerves appear grossly intact,On Vent    Medications:   Medications:    vancomycin  2,000 mg Intravenous Q24H    insulin glargine  60 Units Subcutaneous Nightly    insulin NPH  40 Units Subcutaneous QAM    heparin (porcine)  5,000 Units Subcutaneous 3 times per day    cefepime  2,000 mg Intravenous Q8H    vancomycin (VANCOCIN) intermittent dosing (placeholder)   Other RX Placeholder    metoclopramide  10 mg Intravenous TID    albuterol sulfate HFA  4 puff Inhalation Q4H    And    ipratropium  4 puff Inhalation Q4H    insulin lispro  0-36 Units Subcutaneous Q4H    chlorhexidine  15 mL Mouth/Throat BID    famotidine (PEPCID) injection  20 mg Intravenous BID    miconazole   Topical BID    [Held by provider] apixaban  5 mg Oral BID    sodium chloride flush  10 mL Intravenous 2 times per day    sodium chloride flush  10 mL Intravenous 2 times per day    [Held by provider] atorvastatin  10 mg Oral Daily    [Held by provider] folic acid  1 mg Oral Daily    levothyroxine  75 mcg Oral Daily    [Held by provider] mesalamine  800 mg Oral TID    [Held by provider] pantoprazole  40 mg Oral QAM AC    aspirin  81 mg Oral Daily      Infusions:    IV infusion builder 75 mL/hr at 01/21/21 0643    dialysis builder 1,200 mL/hr at 01/21/21 0958    prismaSATE BGK 4/2.5 200 mL/hr (01/20/21 1507)    dialysis builder 600 mL/hr at 01/21/21 0958    norepinephrine Stopped (01/16/21 0500)    fentaNYL 50 mcg/hr (01/20/21 1832)    propofol 20 mcg/kg/min (01/21/21 0954)    dextrose      DOPamine 11.5 mcg/kg/min (01/21/21 1125)     PRN Meds:     potassium chloride, 20 mEq, PRN      magnesium sulfate, 1,000 mg, PRN      calcium gluconate, 1 g, PRN    Or      calcium gluconate, 2 g, PRN    Or      calcium gluconate, 3 g, PRN    Or      calcium gluconate, 4 g, PRN      sodium phosphate IVPB, 6 mmol, PRN    Or      sodium phosphate IVPB, 12 mmol, PRN    Or      sodium phosphate IVPB, 18 mmol, PRN    Or      sodium phosphate IVPB, 24 mmol, PRN      acetaminophen, 650 mg, Q6H PRN    Or      acetaminophen, 650 mg, Q6H PRN      heparin (porcine), 10,000 Units, PRN      heparin (porcine), 5,000 Units, PRN      polyvinyl alcohol, 1 drop, Q4H PRN    And      artificial tears, , Q4H PRN      sodium chloride flush, 10 mL, PRN      glucose, 15 g, PRN      dextrose, 12.5 g, PRN      glucagon (rDNA), 1 mg, PRN      dextrose, 100 mL/hr, PRN      albuterol sulfate HFA, 2 puff, Q6H PRN      sodium chloride flush, 10 mL, PRN      promethazine, 12.5 mg, Q6H PRN    Or      ondansetron, 4 mg, Q6H PRN      polyethylene glycol, 17 g, Daily PRN      guaiFENesin-dextromethorphan, 5 mL, Q4H PRN      diazePAM, 5 mg, TID PRN      sodium chloride, 30 mL, PRN        Data    Recent Labs     01/19/21  0600 01/20/21  0830 01/21/21  0525   WBC 11.4* 15.3* 20.4*   HGB 10.6* 10.2* 10.3*   HCT 31.3* 29.2* 29.7*    251 282      Recent Labs     01/20/21  0830 01/20/21  1815 01/21/21  0015 01/21/21  0525   * 120*  --  124*   K 6.7* 6.2* 5.7* 5.6*   CL 79* 81*  --  87*   CO2 18* 18*  --  19*   PHOS 10.4* 8.9*  --  6.7*   * 104*  --  86*   CREATININE 3.9* 3.4*  --  2.9*     Recent Labs     01/20/21 1815 01/21/21  0525   AST 31 28   ALT 15 14   BILITOT 1.5* 1.2*   ALKPHOS 46 44     No results for input(s): INR in the last 72 hours. No results for input(s): CKTOTAL, CKMB, CKMBINDEX, TROPONINI in the last 72 hours.         Electronically signed by Dani Salomon MD on 1/21/2021 at 12:46 PM

## 2021-01-21 NOTE — PROGRESS NOTES
Nephrology Progress Note  1/21/2021 1:03 PM  Subjective:      Interval History: Leroy Wall is a 67 y.o. male with vent  And on crrt tolerating and uf 100cc/hr as able and if K high change to 0K bath        Data:   Scheduled Meds:   vancomycin  2,000 mg Intravenous Q24H    insulin glargine  60 Units Subcutaneous Nightly    insulin NPH  40 Units Subcutaneous QAM    heparin (porcine)  5,000 Units Subcutaneous 3 times per day    cefepime  2,000 mg Intravenous Q8H    vancomycin (VANCOCIN) intermittent dosing (placeholder)   Other RX Placeholder    metoclopramide  10 mg Intravenous TID    albuterol sulfate HFA  4 puff Inhalation Q4H    And    ipratropium  4 puff Inhalation Q4H    insulin lispro  0-36 Units Subcutaneous Q4H    chlorhexidine  15 mL Mouth/Throat BID    famotidine (PEPCID) injection  20 mg Intravenous BID    miconazole   Topical BID    [Held by provider] apixaban  5 mg Oral BID    sodium chloride flush  10 mL Intravenous 2 times per day    sodium chloride flush  10 mL Intravenous 2 times per day    [Held by provider] atorvastatin  10 mg Oral Daily    [Held by provider] folic acid  1 mg Oral Daily    levothyroxine  75 mcg Oral Daily    [Held by provider] mesalamine  800 mg Oral TID    [Held by provider] pantoprazole  40 mg Oral QAM AC    aspirin  81 mg Oral Daily     Continuous Infusions:   IV infusion builder 75 mL/hr at 01/21/21 0643    dialysis builder 1,200 mL/hr at 01/21/21 0958    prismaSATE BGK 4/2.5 200 mL/hr (01/20/21 1507)    dialysis builder 600 mL/hr at 01/21/21 0958    norepinephrine Stopped (01/16/21 0500)    fentaNYL 50 mcg/hr (01/20/21 1832)    propofol 20 mcg/kg/min (01/21/21 0954)    dextrose      DOPamine 11.5 mcg/kg/min (01/21/21 1125)           CBC:   Recent Labs     01/19/21  0600 01/20/21  0830 01/21/21  0525   WBC 11.4* 15.3* 20.4*   HGB 10.6* 10.2* 10.3*    251 282     BMP:    Recent Labs     01/20/21  0830 01/20/21  1815 01/21/21  0015 01/21/21  0525   * 120*  --  124*   K 6.7* 6.2* 5.7* 5.6*   CL 79* 81*  --  87*   CO2 18* 18*  --  19*   * 104*  --  86*   CREATININE 3.9* 3.4*  --  2.9*   GLUCOSE 230* 180*  --  166*       Renal Labs  Albumin:    Lab Results   Component Value Date    LABALBU 2.5 01/21/2021     Calcium:    Lab Results   Component Value Date    CALCIUM 7.7 01/21/2021     Phosphorus:    Lab Results   Component Value Date    PHOS 6.7 01/21/2021     U/A:    Lab Results   Component Value Date    NITRU NEGATIVE 01/19/2021    COLORU ROBINA 01/19/2021    WBCUA NONE SEEN 01/19/2021    RBCUA 29 01/19/2021    MUCUS RARE 01/19/2021    TRICHOMONAS NONE SEEN 01/19/2021    BACTERIA RARE 01/19/2021    CLARITYU SLIGHTLY CLOUDY 01/19/2021    SPECGRAV 1.020 01/19/2021    UROBILINOGEN NORMAL 01/19/2021    BILIRUBINUR NEGATIVE 01/19/2021    BLOODU SMALL 01/19/2021    KETUA SMALL 01/19/2021           Objective:   I/O: 01/20 0701 - 01/21 0700  In: 2801 [I.V.:2501]  Out: 3486 [Urine:522]  Vitals: BP (!) 101/55   Pulse 84   Temp 97.3 °F (36.3 °C)   Resp 15   Ht 5' 11.65\" (1.82 m)   Wt (!) 341 lb 14.9 oz (155.1 kg)   SpO2 92%   BMI 46.82 kg/m²   General appearance: sedated ett  HEENT: Head: Normal, normocephalic, atraumatic.   Neck: supple, symmetrical, trachea midline  Lungs: diminished breath sounds bilaterally  Heart: S1, S2 normal  Abdomen: abnormal findings:  soft nt  Extremities: edema trace  Neurologic: Mental status: alertness: sedated        Assessment and Plan:      IMP:  arf from atn on ckd 3  Met acidosis  Hyperkalemia  resp failure with covid  Hypotension  Dm2  Hyponatremia    Plan     1 renal with crrt and uf 100cc/hr tolerating  2 on bicarb gtt as acidosis still and lactic normal and monitor  3 on 4 K bath and fu K and if stay > 5 change to 0K/3.5ca bath  4 on vent and try wean  5 ssi  6 na improve with dialysis and off vaprisol  Will monitor and I CATHERINE SON AND UPDATED, appreciated dad' s care  Will watch with above Rajendra Mathur MD

## 2021-01-22 NOTE — PROGRESS NOTES
I called this patient's son [facility] to give him an update. I informed him that Dr. Bailey Jamil is restarting CRRT this morning. Son, denied any questions or concerns at this time.

## 2021-01-22 NOTE — PROGRESS NOTES
Nephrology Progress Note  1/22/2021 1:31 PM        Subjective:   Admit Date: 1/8/2021  PCP: Guicho Wallace MD    Interval History: intubated - CRRT was stopped last PM with hypotension     Diet: TF per NG at 20 ml/ min     ROS:  Intubated - AC FIO2 100 % / PEEP 10/ pl pressure 25   UOP  - aneuric   NE       Data:     Current meds:    vancomycin  2,000 mg Intravenous Q24H    insulin glargine  60 Units Subcutaneous Nightly    insulin NPH  40 Units Subcutaneous QAM    heparin (porcine)  5,000 Units Subcutaneous 3 times per day    cefepime  2,000 mg Intravenous Q8H    vancomycin (VANCOCIN) intermittent dosing (placeholder)   Other RX Placeholder    metoclopramide  10 mg Intravenous TID    albuterol sulfate HFA  4 puff Inhalation Q4H    And    ipratropium  4 puff Inhalation Q4H    insulin lispro  0-36 Units Subcutaneous Q4H    chlorhexidine  15 mL Mouth/Throat BID    famotidine (PEPCID) injection  20 mg Intravenous BID    miconazole   Topical BID    [Held by provider] apixaban  5 mg Oral BID    sodium chloride flush  10 mL Intravenous 2 times per day    sodium chloride flush  10 mL Intravenous 2 times per day    [Held by provider] atorvastatin  10 mg Oral Daily    [Held by provider] folic acid  1 mg Oral Daily    levothyroxine  75 mcg Oral Daily    [Held by provider] mesalamine  800 mg Oral TID    [Held by provider] pantoprazole  40 mg Oral QAM AC    aspirin  81 mg Oral Daily      dialysis builder 1,600 mL/hr at 01/22/21 0904    prismaSATE BGK 4/2.5 1,600 mL/hr (01/22/21 1235)    dialysis builder 200 mL/hr at 01/22/21 0904    norepinephrine 24 mcg/min (01/22/21 1246)    fentaNYL 200 mcg/hr (01/22/21 1130)    propofol 45 mcg/kg/min (01/22/21 1255)    dextrose      DOPamine Stopped (01/21/21 1639)         I/O last 3 completed shifts:   In: 4861 [I.V.:4048; NG/GT:411; IV Piggyback:590]  Out: 5150 [Urine:121]    CBC:   Recent Labs     01/20/21  0830 01/21/21  0525 01/22/21  0521   WBC 15.3* 20.4* 20.0*   HGB 10.2* 10.3* 9.0*    282 211          Recent Labs     01/21/21  1230 01/21/21  1820 01/22/21  0521   * 122* 123*   K 5.0 4.7 5.6*   CL 88* 87* 86*   CO2 21 20* 19*   BUN 74* 69* 77*   CREATININE 2.6* 2.7* 3.3*   GLUCOSE 128* 161* 168*       Lab Results   Component Value Date    CALCIUM 7.8 (L) 01/22/2021    PHOS 6.1 (H) 01/22/2021       Objective:     Vitals: BP (!) 124/55   Pulse 59   Temp 98.7 °F (37.1 °C) (Rectal)   Resp 12   Ht 5' 11.65\" (1.82 m)   Wt (!) 341 lb 14.9 oz (155.1 kg)   SpO2 92%   BMI 46.82 kg/m²     General appearance:  intubated . Sedated with propofol and fentanyl   HEENT:  No gross conj pallor-  Neck:  Rt IJ temp HD cath,  Lungs:  Limited exam , + adv Bs  Heart:  irregular  Abdomen: soft  Extremities:  ++ edema   Has zamora   Also RUE PICC       Problem List :         Impression :     1. ANASTASIA - aneuric - sec to septic shock  2. Septic shock- with COVID 19 and multiorgan dys fx   3. Underlying arrhythmia / DM  Etc   4. Has high K/ met acidosis etc     Recommendation/Plan  :     1. Restart CRRT   2. He is on protocol driven therapy from COVID 19  3. Watch  For  iatrogenic and nosocomial complication  4. supportive care  5.  Good BS control   6. ']follow clinically       Fazal Sawant MD

## 2021-01-22 NOTE — PROGRESS NOTES
pulmonary      SUBJECTIVE: he remains on high vent setting     OBJECTIVE    VITALS:  BP (!) 119/47   Pulse 59   Temp 100.1 °F (37.8 °C) (Rectal)   Resp 15   Ht 5' 11.65\" (1.82 m)   Wt (!) 341 lb 14.9 oz (155.1 kg)   SpO2 94%   BMI 46.82 kg/m²   HEAD AND FACE EXAM:  No throat injection, no active exudate,no thrush  NECK EXAM;No JVD, no masses, symmetrical  CHEST EXAM; Expansion equal and symmetrical, no masses  LUNG EXAM; Good breath sounds bilaterally. There are expiratory wheezes both lungs, there are crackles at both lung bases  CARDIOVASCULAR EXAM: Positive S1 and S2, no S3 or S4, no clicks ,no murmurs  RIGHT AND LEFT LOWER EXTRIMITY EXAM: No edema, no swelling, no inflamation            LABS   Lab Results   Component Value Date    WBC 20.0 (H) 01/22/2021    HGB 9.0 (L) 01/22/2021    HCT 26.7 (L) 01/22/2021    MCV 94.7 01/22/2021     01/22/2021     Lab Results   Component Value Date    CREATININE 3.3 (H) 01/22/2021    BUN 77 (H) 01/22/2021     (L) 01/22/2021    K 5.6 (HH) 01/22/2021    CL 86 (L) 01/22/2021    CO2 19 (L) 01/22/2021     Lab Results   Component Value Date    INR 1.02 01/08/2021    PROTIME 13.3 01/08/2021          Lab Results   Component Value Date    PHOS 6.1 01/22/2021    PHOS 4.8 01/21/2021    PHOS 6.7 01/21/2021        Recent Labs     01/21/21  0600 01/21/21  1300 01/22/21  0600   PH 7.29* 7.27* 7.25*   PO2ART 67* 72* 67*   LHZ1DVU 41.0 49.0* 47.0*   O2SAT 90.1* 92.4* 89.2*         Wt Readings from Last 3 Encounters:   No data found for Wt               ASSESMENT  Ac resp failure  covid pneumonia  abraham pneumonia        PLAN  1. cpm  2.  Cont full vent support  3. cxr is pending    1/22/2021  Kait Rausch M.D.

## 2021-01-22 NOTE — PROGRESS NOTES
[address] [facility]  consulted by Dr. Wilber Love for monitoring and adjustment. Indication for treatment: COVID 19, possible secondary bacterial infection  Goal trough: 15 mcg/mL, AUC/LOGAN 400-600    Pertinent Laboratory Values:   Temp Readings from Last 3 Encounters:   01/22/21 99.7 °F (37.6 °C) (Rectal)     Recent Labs     01/20/21  0830 01/21/21  0525 01/22/21  0521   WBC 15.3* 20.4* 20.0*   LACTATE  --  1.0 1.5     Recent Labs     01/21/21  1230 01/21/21  1820 01/22/21  0521   BUN 74* 69* 77*   CREATININE 2.6* 2.7* 3.3*     Estimated Creatinine Clearance: 29 mL/min (A) (based on SCr of 3.3 mg/dL (H)). Intake/Output Summary (Last 24 hours) at 1/22/2021 1059  Last data filed at 1/22/2021 1000  Gross per 24 hour   Intake 5570.07 ml   Output 1493 ml   Net 4077.07 ml       Pertinent Cultures:  Date    Source    Results  1/8/21   Covid-19   Positive  1/8/21   Blood    Negative  1/11/21  MRSA nasal   Negative  1/13/21  Respiratory   Negative    Vancomycin level:   TROUGH:    No results for input(s): VANCOTROUGH in the last 72 hours.   RANDOM:    Recent Labs     01/20/21  0830 01/21/21  0525   VANCORANDOM 20.5 15.4     Assessment:  · WBC and temperature: WBC trends up (receiving methylprednisolone); afebrile  · SCr, BUN, and urine output: ANASTASIA, CRRT continues   · Day(s) of therapy: # 12  · Vancomycin concentration:  · 1/18: 27.5, supra-therapeutic, 10h post-dose  · 1/19: 22.8, supra-therapeutic, 45h post-dose  · 1/20:  20.5, above goal, 72h post-dose  · 1/21: 15.4, after CRRT initiation     Plan:  · Pulse vancomycin dosing per levels 2/2 ANASTASIA and RRT   · Will order vancomycin 2000 mg q24h x 2 doses   · CRRT paused overnight, resumed this morning, ok to give dose this afternoon  · Will need to follow up to see if CRRT to continue this weekend   · Pharmacy will continue to monitor patient and adjust therapy as indicated    Vinh 3 1/23 @ 1200 (24h post-dose)    Thank you for the consult.   Jovani Ballesteros, PharmD, BCPS   1/22/2021  10:59 AM

## 2021-01-22 NOTE — PROGRESS NOTES
Infectious Disease Progress Note  2021   Patient Name: Janina Montoya : 1945       Reason for visit: F/u COVID-19 pneumonia, acute respiratory failure? History:? Interval history noted  Intubated, sedated and on mechanical ventilation  Physical Exam:  Vital Signs: BP (!) 124/55   Pulse 59   Temp 98.7 °F (37.1 °C) (Rectal)   Resp 12   Ht 5' 11.65\" (1.82 m)   Wt (!) 341 lb 14.9 oz (155.1 kg)   SpO2 92%   BMI 46.82 kg/m²     Gen: intubated and sedated  Skin: no stigmata of endocarditis  Wounds: C/D/I  HEMT: AT/NC ETT  Eyes: PERRLA. Neck: Supple. Trachea midline. No LAD. Chest:  transmitted breath sounds. Heart:   Abd: soft, non-distended, no tenderness, no hepatomegaly. Normoactive bowel sounds. Ext: no clubbing, cyanosis, or edema  Catheter Site: without erythema or tenderness  LDA: right arm PICC line, Urethral catheter:  Neuro: sedated and on the ventilator. Radiologic / Imaging / TESTING    Chest x-ray 2021   impression:  Endotracheal tube placement now lies 6.6 cm above the jalil. Similar appearing parenchymal infiltrates seen diffusely, with possible trace   right pleural effusion at the costophrenic angle.         Labs:    Recent Results (from the past 24 hour(s))   POCT Glucose    Collection Time: 21  2:21 PM   Result Value Ref Range    POC Glucose 147 (H) 70 - 99 MG/DL   POCT Glucose    Collection Time: 21  5:43 PM   Result Value Ref Range    POC Glucose 120 (H) 70 - 99 MG/DL   Comprehensive Metabolic Panel w/ Reflex to MG    Collection Time: 21  6:20 PM   Result Value Ref Range    Sodium 122 (L) 135 - 145 MMOL/L    Potassium 4.7 3.5 - 5.1 MMOL/L    Chloride 87 (L) 99 - 110 mMol/L    CO2 20 (L) 21 - 32 MMOL/L    BUN 69 (H) 6 - 23 MG/DL    CREATININE 2.7 (H) 0.9 - 1.3 MG/DL    Glucose 161 (H) 70 - 99 MG/DL    Calcium 7.5 (L) 8.3 - 10.6 MG/DL    Alb 2.3 (L) 3.4 - 5.0 GM/DL    Total Protein 5.3 (L) 6.4 - 8.2 GM/DL    Total Bilirubin 1.2 (H) 0.0 - 1.0 MG/DL Comprehensive Metabolic Panel w/ Reflex to MG    Collection Time: 01/22/21  5:21 AM   Result Value Ref Range    Sodium 123 (L) 135 - 145 MMOL/L    Potassium 5.6 (HH) 3.5 - 5.1 MMOL/L    Chloride 86 (L) 99 - 110 mMol/L    CO2 19 (L) 21 - 32 MMOL/L    BUN 77 (H) 6 - 23 MG/DL    CREATININE 3.3 (H) 0.9 - 1.3 MG/DL    Glucose 168 (H) 70 - 99 MG/DL    Calcium 7.8 (L) 8.3 - 10.6 MG/DL    Alb 2.1 (L) 3.4 - 5.0 GM/DL    Total Protein 4.7 (L) 6.4 - 8.2 GM/DL    Total Bilirubin 1.0 0.0 - 1.0 MG/DL    ALT 13 10 - 40 U/L    AST 25 15 - 37 IU/L    Alkaline Phosphatase 37 (L) 40 - 129 IU/L    GFR Non- 18 (L) >60 mL/min/1.73m2    GFR  22 (L) >60 mL/min/1.73m2    Anion Gap 18 (H) 4 - 16   Magnesium    Collection Time: 01/22/21  5:21 AM   Result Value Ref Range    Magnesium 2.4 1.8 - 2.4 mg/dl   Phosphorus    Collection Time: 01/22/21  5:21 AM   Result Value Ref Range    Phosphorus 6.1 (H) 2.5 - 4.9 MG/DL   Lactic Acid, Plasma    Collection Time: 01/22/21  5:21 AM   Result Value Ref Range    Lactate 1.5 0.4 - 2.0 mMOL/L   High sensitivity CRP    Collection Time: 01/22/21  5:21 AM   Result Value Ref Range    CRP High Sensitivity 187.0 (H) <8.5 mg/L   Blood gas, arterial    Collection Time: 01/22/21  6:00 AM   Result Value Ref Range    pH, Bld 7.25 (L) 7.34 - 7.45    pCO2, Arterial 47.0 (H) 32 - 45 MMHG    pO2, Arterial 67 (L) 75 - 100 MMHG    Base Excess 7 (H) 0 - 3.3    HCO3, Arterial 20.6 18 - 23 MMOL/L    CO2 Content 22.0 19 - 24 MMOL/L    O2 Sat 89.2 (L) 96 - 97 %    Carbon Monoxide, Blood 1.7 0 - 5 %    Methemoglobin, Arterial 1.9 (H) <1.5 %    Comment AC16 550 100% 10peep    POCT Glucose    Collection Time: 01/22/21  8:25 AM   Result Value Ref Range    POC Glucose 159 (H) 70 - 99 MG/DL   Calcium, Ionized    Collection Time: 01/22/21 11:50 AM   Result Value Ref Range    Ionized Ca 0.99 (L) 1.12 - 1.32 mMOL/L    Calcium, Ion 3.96 (L) 4.48 - 5.28 MG/DL   POCT Glucose    Collection Time: 01/22/21 12:50 PM   Result Value Ref Range    POC Glucose 151 (H) 70 - 99 MG/DL     CULTURE results: Invalid input(s): BLOOD CULTURE,  URINE CULTURE, SURGICAL CULTURE    Diagnosis:  Patient Active Problem List   Diagnosis    COVID-19    Pneumonia due to severe acute respiratory syndrome coronavirus 2 (SARS-CoV-2)    Acute respiratory failure with hypoxia (HCC)    ANASTASIA (acute kidney injury) ([facility]       Active Problems  Principal Problem:    COVID-19  Active Problems:    Pneumonia due to severe acute respiratory syndrome coronavirus 2 (SARS-CoV-2)    Acute respiratory failure with hypoxia (HCC)    ANASTASIA (acute kidney injury) ([facility]  Resolved Problems:    * No resolved hospital problems. *      Impression and plan   Summary and rationale: Patient is a 67 y.o.  male T2DM,CAD, HLD, HTN, AMINA on CPAP, AAA, BMI 46 kg/m2 who was admitted 1/8/2021 for further evaluation and management of SOB that started on 1/6/2021. He had associated, fatigue, myalgia, cough. Tested positive for SARS-CoV-2 by NAAT on 1/8/2021. Rapidly worsened and was intubated on 1/13/2021.  Critical COVID-19 pneumonia, respiratory failure, ANASTASIA, elevated bilirubin, COVID-associated coagulopathy   Clinical status: remains critical, worsening renal function and pct   Therapeutic:  o Ongoing antibiotics:d/c vancomycin, d/c cefepime,continue meropenem on 1/22  o Antiviral agent: remdesivir 1/9-13  o Anti-inflammatory agents:  - Dexamethasone:1/8-19  - Solu-Medrol:1/19-  o Completed antibiotics: vancomycin 1/11-22, cefepime 1/11-22  o Convalescent plasma receipt:  o Other agents:    Diagnostic:   F/u:BDG, Aspergillus Ag, IL-6   Other:      Electronically signed by: Electronically signed by Anna Ocampo MD on 1/22/2021 at 1:41 PM

## 2021-01-22 NOTE — PLAN OF CARE
Ongoing  Goal: Maintain absence of muscle cramping  1/22/2021 0403 by Ari Hargrove RN  Outcome: Ongoing  1/22/2021 0402 by Ari Hargrove RN  Outcome: Ongoing  Goal: Maintain normal serum potassium, sodium, calcium, phosphorus, and pH  1/22/2021 0403 by Ari Hargrove, RN  Outcome: Ongoing  1/22/2021 0402 by Ari Hargrove, RN  Outcome: Ongoing     Problem: Loneliness or Risk for Loneliness  Goal: Demonstrate positive use of time alone when socialization is not possible  1/22/2021 0403 by Ari Hargrove, RN  Outcome: Ongoing  1/22/2021 0402 by Ari Hargrove RN  Outcome: Ongoing     Problem: Fatigue  Goal: Verbalize increase energy and improved vitality  1/22/2021 0403 by Ari Hargrove RN  Outcome: Ongoing  1/22/2021 0402 by Ari Hargrove RN  Outcome: Ongoing     Problem: Skin Integrity:  Goal: Will show no infection signs and symptoms  Description: Will show no infection signs and symptoms  1/22/2021 0403 by Ari Hargrove, RN  Outcome: Ongoing  1/22/2021 0402 by Ari Hargrove RN  Outcome: Ongoing  Goal: Absence of new skin breakdown  Description: Absence of new skin breakdown  1/22/2021 0403 by Ari Hargrove, RN  Outcome: Ongoing  1/22/2021 0402 by Ari Hargrove RN  Outcome: Ongoing     Problem: Falls - Risk of:  Goal: Will remain free from falls  Description: Will remain free from falls  1/22/2021 0403 by Ari Hargrove RN  Outcome: Ongoing  1/22/2021 0402 by Ari Hargrove RN  Outcome: Ongoing  Goal: Absence of physical injury  Description: Absence of physical injury  1/22/2021 0403 by Ari Hargrove RN  Outcome: Ongoing  1/22/2021 0402 by Ari Hargrove RN  Outcome: Ongoing     Problem: Restraint Use - Nonviolent/Non-Self-Destructive Behavior:  Goal: Absence of restraint indications  Description: Absence of restraint indications  1/22/2021 0403 by Ari Hargrove, RN  Outcome: Ongoing  1/22/2021 0402 by Ari Hargrove RN  Outcome: Ongoing  Goal: Absence of restraint-related injury  Description: Absence of restraint-related injury  1/22/2021 0403 by Claudine Marin RN  Outcome: Ongoing  1/22/2021 0402 by Claudine Marin RN  Outcome: Ongoing     Problem: Gas Exchange - Impaired:  Goal: Levels of oxygenation will improve  Description: Levels of oxygenation will improve  Outcome: Ongoing     Problem: Fluid Volume:  Goal: Ability to achieve a balanced intake and output will improve  Description: Ability to achieve a balanced intake and output will improve  Outcome: Ongoing

## 2021-01-22 NOTE — PROGRESS NOTES
01/22/21 0402   Vent Information   Vent Type 980   Vent Mode AC/VC+   Vt Ordered 550 mL   Rate Set 16 bmp   Peak Flow 0 L/min   Pressure Support 0 cmH20   FiO2  100 %  (Simultaneous filing. User may not have seen previous data.)   SpO2 95 %  (Simultaneous filing. User may not have seen previous data.)   SpO2/FiO2 ratio 95  (Simultaneous filing. User may not have seen previous data.)   Sensitivity 2   PEEP/CPAP 10   I Time/ I Time % 0 s   Humidification Source HME   Circuit Condensation Drained   Nitric Oxide/Epoprostenol In Use? No   Vent Patient Data   High Peep/I Pressure 0   Peak Inspiratory Pressure 26 cmH2O   Mean Airway Pressure 16 cmH20   Rate Measured 22 br/min   Vt Exhaled 571 mL   Minute Volume 9.57 Liters   I:E Ratio 1:3.60   Cough/Sputum   Sputum How Obtained Suctioned;Endotracheal   Cough Non-productive;Dry   Frequency Occasional   Sputum Amount None   Sputum Color None   Spontaneous Breathing Trial (SBT) RT Doc   Pulse 52  (Simultaneous filing. User may not have seen previous data.)   Breath Sounds   Right Upper Lobe Diminished   Right Middle Lobe Diminished   Right Lower Lobe Diminished   Left Upper Lobe Diminished   Left Lower Lobe Diminished   Additional Respiratory  Assessments   Resp 13  (Simultaneous filing. User may not have seen previous data.)   Position Semi-Jacobson's   Oral Care Completed? No   Alarm Settings   High Pressure Alarm 50 cmH2O   Press Low Alarm 13.5 cmH2O   Delay Alarm 20 sec(s)   Low Minute Volume Alarm 2.5 L/min   Apnea (secs) 20 secs   High Respiratory Rate 40 br/min   Low Exhaled Vt  250 mL   Patient Observation   Observations Pt is resting in bed   ETT (adult)   Placement Date/Time: 01/13/21 (c 124   Preoxygenation: Yes  Mask Ventilation: Ventilated by mask (1)  Technique: Video laryngoscopy  Tube Size: 7.5 mm  Laryngoscope: Mac  Blade Size: 4  Grade View: Full view of the glottis  Insertion attempts: 1  Pl. ..    Secured at 27 cm   Measured From Lips   ET Placement Left Secured By Commercial tube [email or web address]   Site Condition [employer]

## 2021-01-22 NOTE — PROGRESS NOTES
Hospitalist Progress Note      Name:  Babatunde Prince /Age/Sex: 1945  (67 y.o. male)   MRN & CSN:  [id number] & [id number] Admission Date/Time: 2021  4:54 PM   Location:  - PCP: Johnathon Carroll MD         Hospital Day: 15    Assessment and Plan:   Babatunde Prince is a 67 y.o.  male  who presents with shortness of breath. · Acute respiratory failure with hypoxia secondary to COVID-19 viral pneumonia infection, concern for PE  · Septic shock due to viral pneumonia, possible bacterial superinfection  · COVID-19 positive: 2021  · IV antibiotics, IV steroids initiated on 2021   · Convalescent plasma given: 2021    · Remdesivir: 21  · Oxygen requirement today: ventilated now, still requiring high vent settings  ·   · On pressors  · Ultrasound DVT negative, DC heparin infusion, continue prophylactic  · Pulmonology on board  · ID, continue on cefepime, switch DEXA to Solu-Medrol      · A. Fib, rate controlled  Cardiology was consulted for possible junctional bradycardia, assessed as slow Afib  Was started on dopamine  Cardiology on board    · ANASTASIA, likely prerenal, worsening  Avoid nephrotoxics , nephrology,  CRRT    · Diabetes mellitus type 2, poor control, sliding scale, Lantus, hypoglycemic protocol, endocrine on board     · Nutrition  On tube feeds, dietitian on board    Chronic medical conditions  · HFrEF , not in exacerbation  · Morbid obesity with BMI of 47, encourage weight loss  · HTN, medications on hold due to hypotension  · HLD, fenofibrate  · Chronic anxiety disorder, valium  · Hypothyroidism, synthroid     Prognosis guarded, Angel updated  Diet DIET TUBE FEED CONTINUOUS/CYCLIC NPO; Low Calorie High Protein (Vital HP);  Nasogastric; Continuous; 10; 65; 24   DVT Prophylaxis [] Lovenox, []  Heparin, [] SCDs, []No VTE prophylaxis, patient ambulating   GI Prophylaxis [] PPI, [] H2 Blocker, [] No GI prophylaxis, patient is receiving diet/Tube Feeds   Code Status Full Code Disposition Patient requires continued admission due to    MDM [] Low, [] Moderate,[]  High  Patient's risk as above due to      History of Present Illness:     Pt S&E. Intubated, sedated  10-14 point ROS reviewed negative, unless as noted above    Objective: Intake/Output Summary (Last 24 hours) at 1/22/2021 1127  Last data filed at 1/22/2021 1100  Gross per 24 hour   Intake 5658.57 ml   Output 1241 ml   Net 4417.57 ml      Vitals:   Vitals:    01/22/21 1100   BP: (!) 117/52   Pulse: 58   Resp: 12   Temp:    SpO2: 94%     Physical Exam:    GEN Sedated, intubated. EYES Pupils are equally round. No scleral erythema, discharge, or conjunctivitis. HENT Mucous membranes are moist.  ET tube  NECK No apparent thyromegaly or masses. NG tube in situ  RESP crackles bilateral diminished BS, no wheezes, rales or rhonchi. Symmetric chest movement . CARDIO/VASC S1/S2 auscultated. Regular rate without appreciable murmurs, rubs, or gallops. Peripheral pulses equal bilaterally and palpable. No peripheral edema. GI Abdomen is soft without significant tenderness, masses, or guarding. Bowel sounds are normoactive. Rectal exam deferred.  Alan catheter is present. HEME/LYMPH No petechiae or ecchymoses. MSK No gross joint deformities. Spontaneous movement of all extremities  SKIN Normal coloration, warm, dry.   NEURO Cranial nerves appear grossly intact,On Vent    Medications:   Medications:    vancomycin  2,000 mg Intravenous Q24H    insulin glargine  60 Units Subcutaneous Nightly    insulin NPH  40 Units Subcutaneous QAM    heparin (porcine)  5,000 Units Subcutaneous 3 times per day    cefepime  2,000 mg Intravenous Q8H    vancomycin (VANCOCIN) intermittent dosing (placeholder)   Other RX Placeholder    metoclopramide  10 mg Intravenous TID    albuterol sulfate HFA  4 puff Inhalation Q4H    And    ipratropium  4 puff Inhalation Q4H    insulin lispro  0-36 Units Subcutaneous Q4H    chlorhexidine  15 mL Mouth/Throat BID    famotidine (PEPCID) injection  20 mg Intravenous BID    miconazole   Topical BID    [Held by provider] apixaban  5 mg Oral BID    sodium chloride flush  10 mL Intravenous 2 times per day    sodium chloride flush  10 mL Intravenous 2 times per day    [Held by provider] atorvastatin  10 mg Oral Daily    [Held by provider] folic acid  1 mg Oral Daily    levothyroxine  75 mcg Oral Daily    [Held by provider] mesalamine  800 mg Oral TID    [Held by provider] pantoprazole  40 mg Oral QAM AC    aspirin  81 mg Oral Daily      Infusions:    dialysis builder 1,600 mL/hr at 01/22/21 0904    prismaSATE BGK 4/2.5 1,600 mL/hr (01/22/21 0904)    dialysis builder 200 mL/hr at 01/22/21 0904    norepinephrine 25 mcg/min (01/22/21 0915)    fentaNYL 200 mcg/hr (01/22/21 0544)    propofol 50 mcg/kg/min (01/22/21 1057)    dextrose      DOPamine Stopped (01/21/21 1639)     PRN Meds:     heparin (porcine), 2,600 Units, PRN      potassium chloride, 20 mEq, PRN      magnesium sulfate, 1,000 mg, PRN      calcium gluconate, 1 g, PRN    Or      calcium gluconate, 2 g, PRN    Or      calcium gluconate, 3 g, PRN    Or      calcium gluconate, 4 g, PRN      sodium phosphate IVPB, 6 mmol, PRN    Or      sodium phosphate IVPB, 12 mmol, PRN    Or      sodium phosphate IVPB, 18 mmol, PRN    Or      sodium phosphate IVPB, 24 mmol, PRN      acetaminophen, 650 mg, Q6H PRN    Or      acetaminophen, 650 mg, Q6H PRN      heparin (porcine), 10,000 Units, PRN      heparin (porcine), 5,000 Units, PRN      polyvinyl alcohol, 1 drop, Q4H PRN    And      artificial tears, , Q4H PRN      sodium chloride flush, 10 mL, PRN      glucose, 15 g, PRN      dextrose, 12.5 g, PRN      glucagon (rDNA), 1 mg, PRN      dextrose, 100 mL/hr, PRN      albuterol sulfate HFA, 2 puff, Q6H PRN      sodium chloride flush, 10 mL, PRN      promethazine, 12.5 mg, Q6H PRN    Or      ondansetron, 4 mg, Q6H PRN     polyethylene glycol, 17 g, Daily PRN      guaiFENesin-dextromethorphan, 5 mL, Q4H PRN      diazePAM, 5 mg, TID PRN      sodium chloride, 30 mL, PRN        Data    Recent Labs     01/20/21  0830 01/21/21  0525 01/22/21  0521   WBC 15.3* 20.4* 20.0*   HGB 10.2* 10.3* 9.0*   HCT 29.2* 29.7* 26.7*    282 211      Recent Labs     01/21/21  0525 01/21/21  1230 01/21/21  1820 01/22/21  0521   * 126* 122* 123*   K 5.6* 5.0 4.7 5.6*   CL 87* 88* 87* 86*   CO2 19* 21 20* 19*   PHOS 6.7*  --  4.8 6.1*   BUN 86* 74* 69* 77*   CREATININE 2.9* 2.6* 2.7* 3.3*     Recent Labs     01/21/21  1230 01/21/21 1820 01/22/21  0521   AST 28 26 25   ALT 15 14 13   BILITOT 1.1* 1.2* 1.0   ALKPHOS 42 41 37*     No results for input(s): INR in the last 72 hours. No results for input(s): CKTOTAL, CKMB, CKMBINDEX, TROPONINI in the last 72 hours.         Electronically signed by Shiraz Olivas MD on 1/22/2021 at 11:27 AM

## 2021-01-23 NOTE — PLAN OF CARE
Absence of physical injury  Outcome: Ongoing     Problem: Restraint Use - Nonviolent/Non-Self-Destructive Behavior:  Goal: Absence of restraint indications  Description: Absence of restraint indications  Outcome: Ongoing  Goal: Absence of restraint-related injury  Description: Absence of restraint-related injury  Outcome: Ongoing     Problem: Restraint Use - Nonviolent/Non-Self-Destructive Behavior:  Goal: Absence of restraint indications  Description: Absence of restraint indications  Outcome: Ongoing  Goal: Absence of restraint-related injury  Description: Absence of restraint-related injury  Outcome: Ongoing     Problem: Gas Exchange - Impaired:  Goal: Levels of oxygenation will improve  Description: Levels of oxygenation will improve  Outcome: Ongoing     Problem: Fluid Volume:  Goal: Ability to achieve a balanced intake and output will improve  Description: Ability to achieve a balanced intake and output will improve  Outcome: Ongoing     Problem: Physical Regulation:  Goal: Ability to maintain clinical measurements within normal limits will improve  Description: Ability to maintain clinical measurements within normal limits will improve  Outcome: Ongoing  Goal: Will show no signs and symptoms of electrolyte imbalance  Description: Will show no signs and symptoms of electrolyte imbalance  Outcome: Ongoing  Goal: Complications related to the disease process, condition or treatment will be avoided or minimized  Description: Complications related to the disease process, condition or treatment will be avoided or minimized  Outcome: Ongoing     Problem: Fluid Volume - Imbalance:  Goal: Absence of imbalanced fluid volume signs and symptoms  Description: Absence of imbalanced fluid volume signs and symptoms  Outcome: Ongoing     Problem: Cardiac:  Goal: Ability to maintain vital signs within normal range will improve  Description: Ability to maintain vital signs within normal range will improve  Outcome: Ongoing  Goal: Cardiovascular alteration will improve  Description: Cardiovascular alteration will improve  Outcome: Ongoing     Problem: Health Behavior:  Goal: Will modify at least one risk factor affecting health status  Description: Will modify at least one risk factor affecting health status  Outcome: Ongoing  Goal: Identification of resources available to assist in meeting health care needs will improve  Description: Identification of resources available to assist in meeting health care needs will improve  Outcome: Ongoing     Problem: Discharge Planning:  Goal: Discharged to appropriate level of care  Description: Discharged to appropriate level of care  Outcome: Ongoing

## 2021-01-23 NOTE — PROGRESS NOTES
Comprehensive Nutrition Assessment    Type and Reason for Visit:  Reassess    Nutrition Recommendations/Plan:   · Reduce EN to 10 mL/hr    Nutrition Assessment:  Pt EN has been restarted, however, he is more hemodynamically unstable with the start of CRRT and his vasopressors have been increased in return. Will reduce EN to 10 mL/hr to better meet pt physical needs and reassess on Monday to see if it can be advanced. Malnutrition Assessment:  Malnutrition Status:  Insufficient data    Context:  Acute Illness       Estimated Daily Nutrient Needs:  Energy (kcal):  0689-9617(hypocaloric feeds 11-14 kcal/kg);  Weight Used for Energy Requirements:  Current     Protein (g):  120-162; Weight Used for Protein Requirements:  Ideal            Wounds:  None       Current Nutrition Therapies:    Current Tube Feeding (TF) Orders:  · Feeding Route: Nasogastric  · Formula: Low Calorie, High Protein  · Schedule: Continuous  · Current TF & Flush Orders Provides: 720 kcal and 63 g of protein per day     Additional Calorie Sources:   1964 kcal from propofol    Anthropometric Measures:  · Height: 5' 11.65\" (182 cm)  · Current Body Weight: 341 lb 14.9 oz (155.1 kg)   · Admission Body Weight: 341 lb 14.9 oz (155.1 kg)(unknown weight source)    · Usual Body Weight: (n/a)     · Ideal Body Weight: 176 lbs; % Ideal Body Weight 194.3 %   · BMI: 46.8  · BMI Categories: Obese Class 3 (BMI 40.0 or greater)       Nutrition Diagnosis:   · Inadequate oral intake related to impaired respiratory function as evidenced by NPO or clear liquid status due to medical condition      Nutrition Interventions:   Food and/or Nutrient Delivery:  Modify Tube Feeding  Nutrition Education/Counseling:  No recommendation at this time   Coordination of Nutrition Care:  Continue to monitor while inpatient    Goals:  pt will receive greater than 75% of his estimated needs       Nutrition Monitoring and Evaluation:   Behavioral-Environmental Outcomes:  None

## 2021-01-23 NOTE — PROGRESS NOTES
Nephrology Progress Note  1/23/2021 1:47 PM        Subjective:   Admit Date: 1/8/2021  PCP: Ant Garcia MD    Interval History: overall condition remains  critical    Diet: TF per NG     ROS:  On CKRT- pressor- NE - oliguric - on mech support ( vent ) AC FIO2 70 %( lower ) / PEEP 10/ pl pressure 26      Data:     Current meds:    meropenem  1,000 mg Intravenous Q12H    minocycline  100 mg Oral BID    methylPREDNISolone  40 mg Intravenous Q6H    insulin glargine  60 Units Subcutaneous Nightly    insulin NPH  40 Units Subcutaneous QAM    heparin (porcine)  5,000 Units Subcutaneous 3 times per day    metoclopramide  10 mg Intravenous TID    albuterol sulfate HFA  4 puff Inhalation Q4H    And    ipratropium  4 puff Inhalation Q4H    insulin lispro  0-36 Units Subcutaneous Q4H    chlorhexidine  15 mL Mouth/Throat BID    famotidine (PEPCID) injection  20 mg Intravenous BID    miconazole   Topical BID    [Held by provider] apixaban  5 mg Oral BID    sodium chloride flush  10 mL Intravenous 2 times per day    sodium chloride flush  10 mL Intravenous 2 times per day    [Held by provider] atorvastatin  10 mg Oral Daily    [Held by provider] folic acid  1 mg Oral Daily    levothyroxine  75 mcg Oral Daily    [Held by provider] mesalamine  800 mg Oral TID    [Held by provider] pantoprazole  40 mg Oral QAM AC    aspirin  81 mg Oral Daily      heparin 5000 units CRRT syringe 0.8 mL/hr at 01/23/21 0904    propofol 60 mcg/kg/min (01/23/21 1236)    midazolam 2 mg/hr (01/23/21 1221)    dialysis builder 1,600 mL/hr at 01/23/21 1142    prismaSATE BGK 4/2.5 1,600 mL/hr (01/23/21 0955)    dialysis builder 200 mL/hr at 01/22/21 1508    norepinephrine 19 mcg/min (01/23/21 0948)    fentaNYL 200 mcg/hr (01/23/21 0934)    dextrose      DOPamine Stopped (01/21/21 1639)         I/O last 3 completed shifts:   In: 3605.6 [I.V.:2614.5; NG/GT:527; IV Piggyback:464.1]  Out: 4801 [Urine:68]    CBC:   Recent Labs 01/21/21  0525 01/22/21  0521 01/23/21  0545   WBC 20.4* 20.0* 22.1*   HGB 10.3* 9.0* 7.7*    211 254          Recent Labs     01/22/21  0521 01/22/21  1850 01/23/21  0545   * 124* 126*   K 5.6* 5.5* 5.4*   CL 86* 90* 94*   CO2 19* 22 23   BUN 77* 56* 37*   CREATININE 3.3* 2.4* 1.8*   GLUCOSE 168* 179* 179*       Lab Results   Component Value Date    CALCIUM 7.3 (L) 01/23/2021    PHOS 3.7 01/23/2021       Objective:     Vitals: BP (!) 122/49   Pulse 55   Temp 97.8 °F (36.6 °C) (Rectal)   Resp 28 Comment: on vent  Ht 5' 11.65\" (1.82 m)   Wt (!) 302 lb 11.1 oz (137.3 kg)   SpO2 (!) 88%   BMI 41.45 kg/m²     General appearance: On vent , sedated - with propofol does reduced to 60 mcg/ kg/ min  , versed and fentanyl   HEENT:  + conj pallor- no gross scleral  icterus - NG   Neck:  Supple- Rt IJ HD temp cath -   Lungs:  Limited exam , + adv Bs  Heart:  Bradycardia   Abdomen: soft  Extremities:  ++ edema   Has zamora and RUE PICC       Problem List :         Impression :     1. ANASTASIA- aneuric sec to sever septic shock  2. Septic shock COVID -19 with  Moderate  ARDS ( Po2/ FIO2- 133 )  And multiorgan dys Fx   3. K / acidosis better with CKRT  4.  DM / fluid overload etc       Recommendation/Plan  :     1. Keep CKRT  2. 4/2.5   3. Slow UF as tolerated as he has fluid overload   4. Glycemic control - he is with steroid   5. Watch for iatrogenic and nosocomial complication  6. supportive care  7.  Follow  clinically       Yesi Tello MD

## 2021-01-23 NOTE — PROGRESS NOTES
Progress Note( Dr. Mark Nicholson)    Subjective:   Admit Date: 1/8/2021  PCP: Fely Murry MD    Admitted For :Hypoxia, shortness of breath, Covid pneumonia    Consulted For: Better control of blood glucose    Interval History: Patient is respiratory status got worse was sedated intubated and placed on ventilator on 1/13/2021  patient's blood glucose are better       Lipids:   Lab Results   Component Value Date    CHOL 115 10/29/2012    HDL 39 10/29/2012    TRIG 80 10/29/2012     Glucose:    [id number]:No results found for: LABA1C  High Sensitivity TSH:   Lab Results   Component Value Date    TSHHS 2.320 10/29/2012     Free T3: No results found for: FT3  Free T4:  Lab Results   Component Value Date    T4FREE 0.89 10/29/2012       Xr Chest Portable    Result Date: 1/8/2021  EXAMINATION: ONE XRAY VIEW OF THE CHEST 1/8/2021 6:12 pm COMPARISON: 02/17/2014 radiograph HISTORY: ORDERING SYSTEM PROVIDED HISTORY: sob \       No acute finding in the chest.     Ct Chest Pulmonary Embolism W Contrast    Result Date: 1/9/2021  EXAMINATION: CTA OF THE CHEST 1/9/2021 4:02 pm       1. No pulmonary embolism. 2. Pulmonary parenchymal findings typical of mild COVID-19 pneumonia. Note that CT pulmonary findings of COVID-19 show overlap with other disease entities including H1N1 influenza, other viral pneumonia (i.e. adenovirus, CMV), organizing pneumonia, alveolar proteinosis and acute interstitial pneumonitis. 3. Trace bilateral pleural effusion. 4. Emphysema. 5. Calcific atherosclerosis aorta and coronary arteries. 6. Mild mediastinal and right hilar lymph node enlargement is almost certainly reactive. This should be followed with IV contrast-enhanced CT chest in 3 months to ensure stability.        Scheduled Medicines   Medications:    meropenem  1,000 mg Intravenous Q12H    minocycline  100 mg Oral BID    methylPREDNISolone  40 mg Intravenous Q6H    insulin glargine  60 Units Subcutaneous Nightly    insulin NPH  40 Units Subcutaneous QAM    heparin (porcine)  5,000 Units Subcutaneous 3 times per day    metoclopramide  10 mg Intravenous TID    albuterol sulfate HFA  4 puff Inhalation Q4H    And    ipratropium  4 puff Inhalation Q4H    insulin lispro  0-36 Units Subcutaneous Q4H    chlorhexidine  15 mL Mouth/Throat BID    famotidine (PEPCID) injection  20 mg Intravenous BID    miconazole   Topical BID    [Held by provider] apixaban  5 mg Oral BID    sodium chloride flush  10 mL Intravenous 2 times per day    sodium chloride flush  10 mL Intravenous 2 times per day    [Held by provider] atorvastatin  10 mg Oral Daily    [Held by provider] folic acid  1 mg Oral Daily    levothyroxine  75 mcg Oral Daily    [Held by provider] mesalamine  800 mg Oral TID    [Held by provider] pantoprazole  40 mg Oral QAM AC    aspirin  81 mg Oral Daily      Infusions:    heparin 5000 units CRRT syringe 0.8 mL/hr at 01/23/21 0904    propofol 50 mcg/kg/min (01/23/21 1358)    midazolam 6 mg/hr (01/23/21 1450)    cisatracurium (NIMBEX) infusion      dialysis builder 1,600 mL/hr at 01/23/21 1457    prismaSATE BGK 4/2.5 1,600 mL/hr (01/23/21 1352)    dialysis builder 200 mL/hr at 01/22/21 1508    norepinephrine 19 mcg/min (01/23/21 0948)    fentaNYL 200 mcg/hr (01/23/21 1501)    dextrose      DOPamine Stopped (01/21/21 1639)         Objective:   Vitals: BP (!) 105/56   Pulse 59   Temp 97.8 °F (36.6 °C) (Rectal)   Resp 12   Ht 5' 11.65\" (1.82 m)   Wt (!) 302 lb 11.1 oz (137.3 kg)   SpO2 93%   BMI 41.45 kg/m²   General appearance: Sedated, intubated and on ventilator   neck: no JVD or bruit  Thyroid : Normal lobes   Lungs: Has Vesicular Breath sounds has some wheezing and some rales intubated and on ventilator  Heart:  regular rate and rhythm  Abdomen: soft, non-tender; bowel sounds normal; no masses,  no organomegaly  Musculoskeletal: Normal  Extremities: extremities normal, , no edema  Neurologic: Sedated, intubated and on ventilator    Assessment:     Patient Active Problem List:     COVID-19       Pneumonia        Diabetes mellitus        Hypertension        Hyperlipidemia        Obstructive sleep apnea        Obesity      Plan:     1. Reviewed POC blood glucose . Labs and X ray results   2. Reviewed Current Medicines   3. On  Correction bolus Humalog/ Basal Lantus Insulin regime and also NPH insulin  4. Monitor Blood glucose frequently   5. Modified  the dose of Insulin/ other medicines as needed   6. Will follow     .      Savi Johnston MD

## 2021-01-23 NOTE — PROGRESS NOTES
Pt dyssynchronous with ventilator despite sedation medcation change and increasing IV infusion rate. Dr. Kenzie Orlando contacted re: initiating neuromuscular blocker agent. Telephone order given to start Nimbex 200mg/100mL IV infusion.

## 2021-01-23 NOTE — PROGRESS NOTES
Pulmonary and Critical Care  Progress Note    Subjective: The patient is sedated on vent. Shortness of breath none  Chest pain none  Addressing respiratory complaints Patient is negative for  hemoptysis and cyanosis  CONSTITUTIONAL:  negative for fevers and chills      Past Medical History:     has a past medical history of Abdominal aortic aneurysm (AAA) without rupture ([facility] [address] Anxiety, Diabetes mellitus ([facility] [address] Edema, Hyperlipidemia, [facility] AMINA on CPAP, and Ulcerative colitis ([facility] has a past surgical history that includes Coronary artery bypass graft (1995); Tonsillectomy; and IR NONTUNNELED VASCULAR CATHETER > 5 YEARS (1/20/2021). reports that he does not drink alcohol or use drugs. Family history:  family history is not on file. No Known Allergies  Social History:    Reviewed; no changes    Objective:   PHYSICAL EXAM:        VITALS:  BP (!) 122/49   Pulse 55   Temp 97.8 °F (36.6 °C) (Rectal)   Resp 28 Comment: on vent  Ht 5' 11.65\" (1.82 m)   Wt (!) 302 lb 11.1 oz (137.3 kg)   SpO2 (!) 88%   BMI 41.45 kg/m²     24HR INTAKE/OUTPUT:      Intake/Output Summary (Last 24 hours) at 1/23/2021 1322  Last data filed at 1/23/2021 1309  Gross per 24 hour   Intake 2462.73 ml   Output 6078 ml   Net -3615.27 ml       CONSTITUTIONAL:  Sedated on vent. LUNGS:  decreased breath sounds, basilar crackles. CARDIOVASCULAR:  normal S1 and S2 and negative JVD  ABD:Abdomen soft, non-tender. BS normal. No masses,  No organomegaly  NEURO:Sedated.   DATA:    CBC:  Recent Labs     01/21/21  0525 01/22/21  0521 01/23/21  0545   WBC 20.4* 20.0* 22.1*   RBC 3.26* 2.82* 2.42*   HGB 10.3* 9.0* 7.7*   HCT 29.7* 26.7* 23.2*    211 254   MCV 91.1 94.7 95.9   MCH 31.6* 31.9* 31.8*   MCHC 34.7 33.7 33.2   RDW 13.7 14.3 14.7      BMP:  Recent Labs     01/22/21  0521 01/22/21  1850 01/23/21  0545   * 124* 126*   K 5.6* 5.5* 5.4*   CL 86* 90* 94*   CO2 19* 22 23   BUN 77* 56* 37*   CREATININE 3. 3* 2.4* 1.8*   CALCIUM 7.8* 7.5* 7.3*   GLUCOSE 168* 179* 179*      ABG:  Recent Labs     01/21/21  1300 01/22/21  0600 01/23/21  0600   PH 7.27* 7.25* 7.29*   PO2ART 72* 67* 80   CGX4HKT 49.0* 47.0* 46.0*   O2SAT 92.4* 89.2* 94.6*     Lab Results   Component Value Date    PROBNP 5,607 (H) 01/19/2021    PROBNP 4,182 (H) 01/08/2021     No results found for: [facility]    Radiology Review:  Pertinent images / reports were reviewed as a part of this visit. Assessment:     Patient Active Problem List   Diagnosis    COVID-19    Pneumonia due to severe acute respiratory syndrome coronavirus 2 (SARS-CoV-2)    Acute respiratory failure with hypoxia (HCC)    ANASTASIA (acute kidney injury) ([facility] [address]       Plan:   1. Overall the patient is unchanged  2. Inc. A/C to 20.  3. Repeat ABGs in 2 hrs.     Denver Watkins MD  1/23/2021  1:22 PM

## 2021-01-23 NOTE — PROGRESS NOTES
I called this patient's son Larissa Pimentel to give him a morning update, but he did not answer. I left a message to give us a call if he did want an update.

## 2021-01-23 NOTE — PROGRESS NOTES
Hospitalist Progress Note      Name:  Baabtunde Prince /Age/Sex: 1945  (67 y.o. male)   MRN & CSN:  [id number] & [id number] Admission Date/Time: 2021  4:54 PM   Location:  - PCP: Johnathon Carroll MD         Hospital Day: 16    Assessment and Plan:   Babatunde Prince is a 67 y.o.  male  who presents with shortness of breath. · Acute respiratory failure with hypoxia secondary to COVID-19 viral pneumonia infection, concern for PE  · Septic shock due to viral pneumonia, possible bacterial superinfection  · COVID-19 positive: 2021  · IV antibiotics, IV steroids initiated on 2021   · Convalescent plasma given: 2021    · Remdesivir: 21  · Oxygen requirement : ventilated  still requiring high vent settings  · On pressors  · Ultrasound DVT negative, DC heparin infusion, continue prophylactic  · Pulmonology on board  · ID, dc vanc and cefepime, switch to leonel and minocycline , On Solu-Medrol      · A. Fib, rate controlled  Cardiology was consulted for possible junctional bradycardia, assessed as slow Afib  Cardiology on board    · ANASTASIA, likely prerenal, worsening  Avoid nephrotoxics , nephrology,  CRRT    · Diabetes mellitus type 2, poor control, sliding scale, Lantus, hypoglycemic protocol, endocrine on board     · Nutrition  On tube feeds, dietitian on board    Chronic medical conditions  · HFrEF , not in exacerbation  · Morbid obesity with BMI of 47, encourage weight loss  · HTN, medications on hold due to hypotension  · HLD, fenofibrate  · Chronic anxiety disorder, valium  · Hypothyroidism, synthroid     Prognosis guarded  Diet DIET TUBE FEED CONTINUOUS/CYCLIC NPO; Low Calorie High Protein (Vital HP);  Nasogastric; Continuous; 10; 10; 24   DVT Prophylaxis [] Lovenox, []  Heparin, [] SCDs, []No VTE prophylaxis, patient ambulating   GI Prophylaxis [] PPI, [] H2 Blocker, [] No GI prophylaxis, patient is receiving diet/Tube Feeds   Code Status Full Code   Disposition Patient requires continued admission due to    MDM [] Low, [] Moderate,[]  High  Patient's risk as above due to      History of Present Illness:     Pt S&E. Intubated, sedated  10-14 point ROS reviewed negative, unless as noted above    Objective: Intake/Output Summary (Last 24 hours) at 1/23/2021 1259  Last data filed at 1/23/2021 1200  Gross per 24 hour   Intake 2462.73 ml   Output 5796 ml   Net -3333.27 ml      Vitals:   Vitals:    01/23/21 1130   BP: (!) 122/49   Pulse: 55   Resp: 28   Temp: 97.8 °F (36.6 °C)   SpO2: (!) 88%     Physical Exam:    GEN Sedated, intubated. EYES Pupils are equally round. No scleral erythema, discharge, or conjunctivitis. HENT Mucous membranes are moist.  ET tube  NECK No apparent thyromegaly or masses. NG tube in situ  RESP crackles bilateral diminished BS, no wheezes, rales or rhonchi. Symmetric chest movement . CARDIO/VASC S1/S2 auscultated. Regular rate without appreciable murmurs, rubs, or gallops. Peripheral pulses equal bilaterally and palpable. No peripheral edema. GI Abdomen is soft without significant tenderness, masses, or guarding. Bowel sounds are normoactive. Rectal exam deferred.  Alan catheter is present. HEME/LYMPH No petechiae or ecchymoses. MSK No gross joint deformities. Spontaneous movement of all extremities  SKIN Normal coloration, warm, dry.   NEURO Cranial nerves appear grossly intact,On Vent    Medications:   Medications:    meropenem  1,000 mg Intravenous Q12H    minocycline  100 mg Oral BID    methylPREDNISolone  40 mg Intravenous Q6H    insulin glargine  60 Units Subcutaneous Nightly    insulin NPH  40 Units Subcutaneous QAM    heparin (porcine)  5,000 Units Subcutaneous 3 times per day    metoclopramide  10 mg Intravenous TID    albuterol sulfate HFA  4 puff Inhalation Q4H    And    ipratropium  4 puff Inhalation Q4H    insulin lispro  0-36 Units Subcutaneous Q4H    chlorhexidine  15 mL Mouth/Throat BID    famotidine (PEPCID) injection 20 mg Intravenous BID    miconazole   Topical BID    [Held by provider] apixaban  5 mg Oral BID    sodium chloride flush  10 mL Intravenous 2 times per day    sodium chloride flush  10 mL Intravenous 2 times per day    [Held by provider] atorvastatin  10 mg Oral Daily    [Held by provider] folic acid  1 mg Oral Daily    levothyroxine  75 mcg Oral Daily    [Held by provider] mesalamine  800 mg Oral TID    [Held by provider] pantoprazole  40 mg Oral QAM AC    aspirin  81 mg Oral Daily      Infusions:    heparin 5000 units CRRT syringe 0.8 mL/hr at 01/23/21 0904    propofol 60 mcg/kg/min (01/23/21 1236)    midazolam 2 mg/hr (01/23/21 1221)    dialysis builder 1,600 mL/hr at 01/23/21 1142    prismaSATE BGK 4/2.5 1,600 mL/hr (01/23/21 0955)    dialysis builder 200 mL/hr at 01/22/21 1508    norepinephrine 19 mcg/min (01/23/21 0948)    fentaNYL 200 mcg/hr (01/23/21 0934)    dextrose      DOPamine Stopped (01/21/21 1639)     PRN Meds:     heparin (porcine), 2,600 Units, PRN      potassium chloride, 20 mEq, PRN      magnesium sulfate, 1,000 mg, PRN      calcium gluconate, 1 g, PRN    Or      calcium gluconate, 2 g, PRN    Or      calcium gluconate, 3 g, PRN    Or      calcium gluconate, 4 g, PRN      sodium phosphate IVPB, 6 mmol, PRN    Or      sodium phosphate IVPB, 12 mmol, PRN    Or      sodium phosphate IVPB, 18 mmol, PRN    Or      sodium phosphate IVPB, 24 mmol, PRN      acetaminophen, 650 mg, Q6H PRN    Or      acetaminophen, 650 mg, Q6H PRN      heparin (porcine), 10,000 Units, PRN      heparin (porcine), 5,000 Units, PRN      polyvinyl alcohol, 1 drop, Q4H PRN    And      artificial tears, , Q4H PRN      sodium chloride flush, 10 mL, PRN      glucose, 15 g, PRN      dextrose, 12.5 g, PRN      glucagon (rDNA), 1 mg, PRN      dextrose, 100 mL/hr, PRN      albuterol sulfate HFA, 2 puff, Q6H PRN      sodium chloride flush, 10 mL, PRN      promethazine, 12.5 mg, Q6H PRN Or      ondansetron, 4 mg, Q6H PRN      polyethylene glycol, 17 g, Daily PRN      guaiFENesin-dextromethorphan, 5 mL, Q4H PRN      diazePAM, 5 mg, TID PRN      sodium chloride, 30 mL, PRN        Data    Recent Labs     01/21/21  0525 01/22/21  0521 01/23/21  0545   WBC 20.4* 20.0* 22.1*   HGB 10.3* 9.0* 7.7*   HCT 29.7* 26.7* 23.2*    211 254      Recent Labs     01/22/21  0521 01/22/21 1850 01/23/21  0545   * 124* 126*   K 5.6* 5.5* 5.4*   CL 86* 90* 94*   CO2 19* 22 23   PHOS 6.1* 4.8 3.7   BUN 77* 56* 37*   CREATININE 3.3* 2.4* 1.8*     Recent Labs     01/22/21 0521 01/22/21 1850 01/23/21  0545   AST 25 30 25   ALT 13 14 13   BILITOT 1.0 1.4* 0.9   ALKPHOS 37* 44 37*     No results for input(s): INR in the last 72 hours. No results for input(s): CKTOTAL, CKMB, CKMBINDEX, TROPONINI in the last 72 hours.         Electronically signed by Lovenia Mcardle, MD on 1/23/2021 at 12:59 PM

## 2021-01-23 NOTE — PROGRESS NOTES
Dr. Thais Welch at bedside, informed her of patient's vent dyssynchrony - pt breathing over vent, not obtaining adequate tidal volumes or breaths > 1000mL. Verbal order to start Versed gtt and wean off Propofol gtt.

## 2021-01-23 NOTE — PROGRESS NOTES
Progress Note( Dr. Alaina Diez)    Subjective:   Admit Date: 1/8/2021  PCP: Tim Damian MD    Admitted For :Hypoxia, shortness of breath, Covid pneumonia    Consulted For: Better control of blood glucose    Interval History: Patient is respiratory status got worse was sedated intubated and placed on ventilator on 1/13/2021  patient's blood glucose are better         DATA    CBC:   Recent Labs     01/21/21  0525 01/22/21  0521 01/23/21  0545   WBC 20.4* 20.0* 22.1*   HGB 10.3* 9.0* 7.7*    211 254    CMP:  Recent Labs     01/22/21  0521 01/22/21  1850 01/23/21  0545   * 124* 126*   K 5.6* 5.5* 5.4*   CL 86* 90* 94*   CO2 19* 22 23   BUN 77* 56* 37*   CREATININE 3.3* 2.4* 1.8*   CALCIUM 7.8* 7.5* 7.3*   PROT 4.7* 5.1* 4.3*   LABALBU 2.1* 2.1* 1.9*   BILITOT 1.0 1.4* 0.9   ALKPHOS 37* 44 37*   AST 25 30 25   ALT 13 14 13     Lipids:   Lab Results   Component Value Date    CHOL 115 10/29/2012    HDL 39 10/29/2012    TRIG 80 10/29/2012     Glucose:    [id number]:No results found for: LABA1C  High Sensitivity TSH:   Lab Results   Component Value Date    TSHHS 2.320 10/29/2012     Free T3: No results found for: FT3  Free T4:  Lab Results   Component Value Date    T4FREE 0.89 10/29/2012       Xr Chest Portable    Result Date: 1/8/2021  EXAMINATION: ONE XRAY VIEW OF THE CHEST 1/8/2021 6:12 pm COMPARISON: 02/17/2014 radiograph HISTORY: ORDERING SYSTEM PROVIDED HISTORY: sob \       No acute finding in the chest.     Ct Chest Pulmonary Embolism W Contrast    Result Date: 1/9/2021  EXAMINATION: CTA OF THE CHEST 1/9/2021 4:02 pm       1. No pulmonary embolism. 2. Pulmonary parenchymal findings typical of mild COVID-19 pneumonia. Note that CT pulmonary findings of COVID-19 show overlap with other disease entities including H1N1 influenza, other viral pneumonia (i.e. adenovirus, CMV), organizing pneumonia, alveolar proteinosis and acute interstitial pneumonitis. 3. Trace bilateral pleural effusion. 4. Emphysema.  5. Calcific atherosclerosis aorta and coronary arteries. 6. Mild mediastinal and right hilar lymph node enlargement is almost certainly reactive. This should be followed with IV contrast-enhanced CT chest in 3 months to ensure stability.        Scheduled Medicines   Medications:    meropenem  1,000 mg Intravenous Q12H    minocycline  100 mg Oral BID    methylPREDNISolone  40 mg Intravenous Q6H    insulin glargine  60 Units Subcutaneous Nightly    insulin NPH  40 Units Subcutaneous QAM    heparin (porcine)  5,000 Units Subcutaneous 3 times per day    metoclopramide  10 mg Intravenous TID    albuterol sulfate HFA  4 puff Inhalation Q4H    And    ipratropium  4 puff Inhalation Q4H    insulin lispro  0-36 Units Subcutaneous Q4H    chlorhexidine  15 mL Mouth/Throat BID    famotidine (PEPCID) injection  20 mg Intravenous BID    miconazole   Topical BID    [Held by provider] apixaban  5 mg Oral BID    sodium chloride flush  10 mL Intravenous 2 times per day    sodium chloride flush  10 mL Intravenous 2 times per day    [Held by provider] atorvastatin  10 mg Oral Daily    [Held by provider] folic acid  1 mg Oral Daily    levothyroxine  75 mcg Oral Daily    [Held by provider] mesalamine  800 mg Oral TID    [Held by provider] pantoprazole  40 mg Oral QAM AC    aspirin  81 mg Oral Daily      Infusions:    heparin 5000 units CRRT syringe 0.8 mL/hr at 01/23/21 0904    propofol 50 mcg/kg/min (01/23/21 1358)    midazolam 6 mg/hr (01/23/21 1450)    cisatracurium (NIMBEX) infusion      dialysis builder 1,600 mL/hr at 01/23/21 1457    prismaSATE BGK 4/2.5 1,600 mL/hr (01/23/21 1352)    dialysis builder 200 mL/hr at 01/22/21 1508    norepinephrine 19 mcg/min (01/23/21 0948)    fentaNYL 200 mcg/hr (01/23/21 1501)    dextrose      DOPamine Stopped (01/21/21 1639)         Objective:   Vitals: BP (!) 105/56   Pulse 59   Temp 97.8 °F (36.6 °C) (Rectal)   Resp 12   Ht 5' 11.65\" (1.82 m)   Wt (!) 302 lb 11.1 oz (137.3 kg)   SpO2 93%   BMI 41.45 kg/m²   General appearance: Sedated, intubated and on ventilator   neck: no JVD or bruit  Thyroid : Normal lobes   Lungs: Has Vesicular Breath sounds has some wheezing and some rales intubated and on ventilator  Heart:  regular rate and rhythm  Abdomen: soft, non-tender; bowel sounds normal; no masses,  no organomegaly  Musculoskeletal: Normal  Extremities: extremities normal, , no edema  Neurologic: Sedated, intubated and on ventilator    Assessment:     Patient Active Problem List:     COVID-19       Pneumonia        Diabetes mellitus        Hypertension        Hyperlipidemia        Obstructive sleep apnea        Obesity      Plan:     1. Reviewed POC blood glucose . Labs and X ray results   2. Reviewed Current Medicines   3. On  Correction bolus Humalog/ Basal Lantus Insulin regime and also NPH insulin  4. Monitor Blood glucose frequently   5. Modified  the dose of Insulin/ other medicines as needed   6. Will follow     .      Delores Everett MD

## 2021-01-23 NOTE — PROGRESS NOTES
Per chest x-ray, ETT tube is 5cm above jalil. Advanced tube by 2cm. ETT tube is currently 26cm @ lip. Chest x-ray has been ordered to check new placement.

## 2021-01-23 NOTE — PROGRESS NOTES
Progress Note( Dr. Ivana Harris)    Subjective:   Admit Date: 1/8/2021  PCP: Terrie Mccall MD    Admitted For :Hypoxia, shortness of breath, Covid pneumonia    Consulted For: Better control of blood glucose    Interval History: Patient is respiratory status got worse was sedated intubated and placed on ventilator on 1/13/2021  patient's blood glucose are better       Intake/Output Summary (Last 24 hours) at 1/23/2021 1656  Last data filed at 1/23/2021 1500  Gross per 24 hour   Intake 2088.28 ml   Output 5358 ml   Net -3269.72 ml       DATA    CBC:   Recent Labs     01/21/21  0525 01/22/21  0521 01/23/21  0545   WBC 20.4* 20.0* 22.1*   HGB 10.3* 9.0* 7.7*    211 254    CMP:  Recent Labs     01/22/21  0521 01/22/21  1850 01/23/21  0545   * 124* 126*   K 5.6* 5.5* 5.4*   CL 86* 90* 94*   CO2 19* 22 23   BUN 77* 56* 37*   CREATININE 3.3* 2.4* 1.8*   CALCIUM 7.8* 7.5* 7.3*   PROT 4.7* 5.1* 4.3*   LABALBU 2.1* 2.1* 1.9*   BILITOT 1.0 1.4* 0.9   ALKPHOS 37* 44 37*   AST 25 30 25   ALT 13 14 13     Lipids:   Lab Results   Component Value Date    CHOL 115 10/29/2012    HDL 39 10/29/2012    TRIG 80 10/29/2012     Glucose:  Recent Labs     01/23/21  0603 01/23/21  0954 01/23/21  1204   POCGLU 185* 190* 185*     [id number]:No results found for: LABA1C  High Sensitivity TSH:   Lab Results   Component Value Date    TSHHS 2.320 10/29/2012     Free T3: No results found for: FT3  Free T4:  Lab Results   Component Value Date    T4FREE 0.89 10/29/2012       Xr Chest Portable    Result Date: 1/8/2021  EXAMINATION: ONE XRAY VIEW OF THE CHEST 1/8/2021 6:12 pm COMPARISON: 02/17/2014 radiograph HISTORY: ORDERING SYSTEM PROVIDED HISTORY: sob \       No acute finding in the chest.     Ct Chest Pulmonary Embolism W Contrast    Result Date: 1/9/2021  EXAMINATION: CTA OF THE CHEST 1/9/2021 4:02 pm       1. No pulmonary embolism. 2. Pulmonary parenchymal findings typical of mild COVID-19 pneumonia.   Note that CT pulmonary findings of COVID-19 show overlap with other disease entities including H1N1 influenza, other viral pneumonia (i.e. adenovirus, CMV), organizing pneumonia, alveolar proteinosis and acute interstitial pneumonitis. 3. Trace bilateral pleural effusion. 4. Emphysema. 5. Calcific atherosclerosis aorta and coronary arteries. 6. Mild mediastinal and right hilar lymph node enlargement is almost certainly reactive. This should be followed with IV contrast-enhanced CT chest in 3 months to ensure stability.        Scheduled Medicines   Medications:    meropenem  1,000 mg Intravenous Q12H    minocycline  100 mg Oral BID    methylPREDNISolone  40 mg Intravenous Q6H    insulin glargine  60 Units Subcutaneous Nightly    insulin NPH  40 Units Subcutaneous QAM    heparin (porcine)  5,000 Units Subcutaneous 3 times per day    metoclopramide  10 mg Intravenous TID    albuterol sulfate HFA  4 puff Inhalation Q4H    And    ipratropium  4 puff Inhalation Q4H    insulin lispro  0-36 Units Subcutaneous Q4H    chlorhexidine  15 mL Mouth/Throat BID    famotidine (PEPCID) injection  20 mg Intravenous BID    miconazole   Topical BID    [Held by provider] apixaban  5 mg Oral BID    sodium chloride flush  10 mL Intravenous 2 times per day    sodium chloride flush  10 mL Intravenous 2 times per day    [Held by provider] atorvastatin  10 mg Oral Daily    [Held by provider] folic acid  1 mg Oral Daily    levothyroxine  75 mcg Oral Daily    [Held by provider] mesalamine  800 mg Oral TID    [Held by provider] pantoprazole  40 mg Oral QAM AC    aspirin  81 mg Oral Daily      Infusions:    heparin 5000 units CRRT syringe 0.8 mL/hr at 01/23/21 0904    propofol 50 mcg/kg/min (01/23/21 1358)    midazolam 6 mg/hr (01/23/21 1450)    cisatracurium (NIMBEX) infusion      dialysis builder 1,600 mL/hr at 01/23/21 1457    prismaSATE BGK 4/2.5 1,600 mL/hr (01/23/21 1352)    dialysis builder 200 mL/hr at 01/22/21 1508    norepinephrine 19 mcg/min (01/23/21 4004)    fentaNYL 200 mcg/hr (01/23/21 1501)    dextrose      DOPamine Stopped (01/21/21 1639)         Objective:   Vitals: BP (!) 105/56   Pulse 59   Temp 97.8 °F (36.6 °C) (Rectal)   Resp 12   Ht 5' 11.65\" (1.82 m)   Wt (!) 302 lb 11.1 oz (137.3 kg)   SpO2 93%   BMI 41.45 kg/m²   General appearance: Sedated, intubated and on ventilator   neck: no JVD or bruit  Thyroid : Normal lobes   Lungs: Has Vesicular Breath sounds has some wheezing and some rales intubated and on ventilator  Heart:  regular rate and rhythm  Abdomen: soft, non-tender; bowel sounds normal; no masses,  no organomegaly  Musculoskeletal: Normal  Extremities: extremities normal, , no edema  Neurologic: Sedated, intubated and on ventilator    Assessment:     Patient Active Problem List:     COVID-19       Pneumonia        Diabetes mellitus        Hypertension        Hyperlipidemia        Obstructive sleep apnea        Obesity      Plan:     1. Reviewed POC blood glucose . Labs and X ray results   2. Reviewed Current Medicines   3. On  Correction bolus Humalog/ Basal Lantus Insulin regime and also NPH insulin  4. Monitor Blood glucose frequently   5. Modified  the dose of Insulin/ other medicines as needed   6. Will follow     .      Carla Lazaro MD

## 2021-01-23 NOTE — PROGRESS NOTES
Message sent to Dr. Shalom Capps to clarify heparin infusion for CRRT; CRRT max dose at one time is 5mL. OK to give 2000unit/8mL bolus, give 5mL then give 3mL via CRRT.

## 2021-01-23 NOTE — PROGRESS NOTES
Progress Note( Dr. Justo Mccord)  1/23/2021  Subjective:   Admit Date: 1/8/2021  PCP: Bailey Garcia MD    Admitted For :Hypoxia, shortness of breath, Covid pneumonia    Consulted For: Better control of blood glucose    Interval History: Patient is respiratory status got worse was sedated intubated and placed on ventilator on 1/13/2021  patient's blood glucose are better       Intake/Output Summary (Last 24 hours) at 1/23/2021 1655  Last data filed at 1/23/2021 1500  Gross per 24 hour   Intake 2088.28 ml   Output 5358 ml   Net -3269.72 ml       DATA    CBC:   Recent Labs     01/21/21  0525 01/22/21  0521 01/23/21  0545   WBC 20.4* 20.0* 22.1*   HGB 10.3* 9.0* 7.7*    211 254    CMP:  Recent Labs     01/22/21  0521 01/22/21  1850 01/23/21  0545   * 124* 126*   K 5.6* 5.5* 5.4*   CL 86* 90* 94*   CO2 19* 22 23   BUN 77* 56* 37*   CREATININE 3.3* 2.4* 1.8*   CALCIUM 7.8* 7.5* 7.3*   PROT 4.7* 5.1* 4.3*   LABALBU 2.1* 2.1* 1.9*   BILITOT 1.0 1.4* 0.9   ALKPHOS 37* 44 37*   AST 25 30 25   ALT 13 14 13     Lipids:   Lab Results   Component Value Date    CHOL 115 10/29/2012    HDL 39 10/29/2012    TRIG 80 10/29/2012     Glucose:  Recent Labs     01/23/21  0603 01/23/21  0954 01/23/21  1204   POCGLU 185* 190* 185*     [id number]:No results found for: LABA1C  High Sensitivity TSH:   Lab Results   Component Value Date    TSHHS 2.320 10/29/2012     Free T3: No results found for: FT3  Free T4:  Lab Results   Component Value Date    T4FREE 0.89 10/29/2012       Xr Chest Portable    Result Date: 1/8/2021  EXAMINATION: ONE XRAY VIEW OF THE CHEST 1/8/2021 6:12 pm COMPARISON: 02/17/2014 radiograph HISTORY: ORDERING SYSTEM PROVIDED HISTORY: sob \       No acute finding in the chest.     Ct Chest Pulmonary Embolism W Contrast    Result Date: 1/9/2021  EXAMINATION: CTA OF THE CHEST 1/9/2021 4:02 pm       1. No pulmonary embolism. 2. Pulmonary parenchymal findings typical of mild COVID-19 pneumonia.   Note that CT pulmonary findings of COVID-19 show overlap with other disease entities including H1N1 influenza, other viral pneumonia (i.e. adenovirus, CMV), organizing pneumonia, alveolar proteinosis and acute interstitial pneumonitis. 3. Trace bilateral pleural effusion. 4. Emphysema. 5. Calcific atherosclerosis aorta and coronary arteries. 6. Mild mediastinal and right hilar lymph node enlargement is almost certainly reactive. This should be followed with IV contrast-enhanced CT chest in 3 months to ensure stability.        Scheduled Medicines   Medications:    meropenem  1,000 mg Intravenous Q12H    minocycline  100 mg Oral BID    methylPREDNISolone  40 mg Intravenous Q6H    insulin glargine  60 Units Subcutaneous Nightly    insulin NPH  40 Units Subcutaneous QAM    heparin (porcine)  5,000 Units Subcutaneous 3 times per day    metoclopramide  10 mg Intravenous TID    albuterol sulfate HFA  4 puff Inhalation Q4H    And    ipratropium  4 puff Inhalation Q4H    insulin lispro  0-36 Units Subcutaneous Q4H    chlorhexidine  15 mL Mouth/Throat BID    famotidine (PEPCID) injection  20 mg Intravenous BID    miconazole   Topical BID    [Held by provider] apixaban  5 mg Oral BID    sodium chloride flush  10 mL Intravenous 2 times per day    sodium chloride flush  10 mL Intravenous 2 times per day    [Held by provider] atorvastatin  10 mg Oral Daily    [Held by provider] folic acid  1 mg Oral Daily    levothyroxine  75 mcg Oral Daily    [Held by provider] mesalamine  800 mg Oral TID    [Held by provider] pantoprazole  40 mg Oral QAM AC    aspirin  81 mg Oral Daily      Infusions:    heparin 5000 units CRRT syringe 0.8 mL/hr at 01/23/21 0904    propofol 50 mcg/kg/min (01/23/21 1358)    midazolam 6 mg/hr (01/23/21 1450)    cisatracurium (NIMBEX) infusion      dialysis builder 1,600 mL/hr at 01/23/21 1457    prismaSATE BGK 4/2.5 1,600 mL/hr (01/23/21 1352)    dialysis builder 200 mL/hr at 01/22/21 1508    norepinephrine 19 mcg/min (01/23/21 0948)    fentaNYL 200 mcg/hr (01/23/21 1501)    dextrose      DOPamine Stopped (01/21/21 1639)         Objective:   Vitals: BP (!) 105/56   Pulse 59   Temp 97.8 °F (36.6 °C) (Rectal)   Resp 12   Ht 5' 11.65\" (1.82 m)   Wt (!) 302 lb 11.1 oz (137.3 kg)   SpO2 93%   BMI 41.45 kg/m²   General appearance: Sedated, intubated and on ventilator   neck: no JVD or bruit  Thyroid : Normal lobes   Lungs: Has Vesicular Breath sounds has some wheezing and some rales intubated and on ventilator  Heart:  regular rate and rhythm  Abdomen: soft, non-tender; bowel sounds normal; no masses,  no organomegaly  Musculoskeletal: Normal  Extremities: extremities normal, , no edema  Neurologic: Sedated, intubated and on ventilator    Assessment:     Patient Active Problem List:     COVID-19       Pneumonia        Diabetes mellitus        Hypertension        Hyperlipidemia        Obstructive sleep apnea        Obesity      Plan:     1. Reviewed POC blood glucose . Labs and X ray results   2. Reviewed Current Medicines   3. On  Correction bolus Humalog/ Basal Lantus Insulin regime and also NPH insulin  4. Monitor Blood glucose frequently   5. Modified  the dose of Insulin/ other medicines as needed   6. Will follow     .      Ab Reynoso MD

## 2021-01-23 NOTE — PROGRESS NOTES
Infectious Disease Progress Note  2021   Patient Name: Reji Arvizu : 1945       Reason for visit: F/u COVID-19 pneumonia, acute respiratory failure? History:? Interval history noted  Intubated, sedated and on mechanical ventilation  Physical Exam:  Vital Signs: BP (!) 111/50   Pulse 57   Temp 98 °F (36.7 °C) (Rectal)   Resp 9   Ht 5' 11.65\" (1.82 m)   Wt (!) 302 lb 11.1 oz (137.3 kg)   SpO2 90%   BMI 41.45 kg/m²     Gen: intubated and sedated  Skin: no stigmata of endocarditis  Wounds: C/D/I  HEMT: AT/NC ETT  Eyes: PERRLA. Neck: Supple. Trachea midline. No LAD. Chest:  transmitted breath sounds. Heart:   Abd: soft, non-distended, no tenderness, no hepatomegaly. Normoactive bowel sounds. Ext: no clubbing, cyanosis, or edema  Catheter Site: without erythema or tenderness  LDA: right arm PICC line, Urethral catheter:  Neuro: sedated and on the ventilator. Radiologic / Imaging / TESTING    Chest x-ray 2021   impression:  Endotracheal tube placement now lies 6.6 cm above the jalil. Similar appearing parenchymal infiltrates seen diffusely, with possible trace   right pleural effusion at the costophrenic angle.         Labs:    Recent Results (from the past 24 hour(s))   Calcium, Ionized    Collection Time: 21 11:50 AM   Result Value Ref Range    Ionized Ca 0.99 (L) 1.12 - 1.32 mMOL/L    Calcium, Ion 3.96 (L) 4.48 - 5.28 MG/DL   Magnesium    Collection Time: 21 11:50 AM   Result Value Ref Range    Magnesium 2.5 (H) 1.8 - 2.4 mg/dl   POCT Glucose    Collection Time: 21 12:50 PM   Result Value Ref Range    POC Glucose 151 (H) 70 - 99 MG/DL   POCT Glucose    Collection Time: 21  4:28 PM   Result Value Ref Range    POC Glucose 202 (H) 70 - 99 MG/DL   Comprehensive Metabolic Panel w/ Reflex to MG    Collection Time: 21  6:50 PM   Result Value Ref Range    Sodium 124 (L) 135 - 145 MMOL/L    Potassium 5.5 (HH) 3.5 - 5.1 MMOL/L    Chloride 90 (L) 99 - 110 mMol/L CO2 22 21 - 32 MMOL/L    BUN 56 (H) 6 - 23 MG/DL    CREATININE 2.4 (H) 0.9 - 1.3 MG/DL    Glucose 179 (H) 70 - 99 MG/DL    Calcium 7.5 (L) 8.3 - 10.6 MG/DL    Alb 2.1 (L) 3.4 - 5.0 GM/DL    Total Protein 5.1 (L) 6.4 - 8.2 GM/DL    Total Bilirubin 1.4 (H) 0.0 - 1.0 MG/DL    ALT 14 10 - 40 U/L    AST 30 15 - 37 IU/L    Alkaline Phosphatase 44 40 - 128 IU/L    GFR Non- 26 (L) >60 mL/min/1.73m2    GFR  32 (L) >60 mL/min/1.73m2    Anion Gap 12 4 - 16   Calcium, Ionized    Collection Time: 01/22/21  6:50 PM   Result Value Ref Range    Ionized Ca 1.01 (L) 1.12 - 1.32 mMOL/L    Calcium, Ion 4.04 (L) 4.48 - 5.28 MG/DL   Magnesium    Collection Time: 01/22/21  6:50 PM   Result Value Ref Range    Magnesium 2.5 (H) 1.8 - 2.4 mg/dl   Phosphorus    Collection Time: 01/22/21  6:50 PM   Result Value Ref Range    Phosphorus 4.8 2.5 - 4.9 MG/DL   POCT Glucose    Collection Time: 01/22/21  9:28 PM   Result Value Ref Range    POC Glucose 168 (H) 70 - 99 MG/DL   Calcium, Ionized    Collection Time: 01/23/21 12:15 AM   Result Value Ref Range    Ionized Ca 1.04 (L) 1.12 - 1.32 mMOL/L    Calcium, Ion 4.16 (L) 4.48 - 5.28 MG/DL   Magnesium    Collection Time: 01/23/21 12:15 AM   Result Value Ref Range    Magnesium 2.3 1.8 - 2.4 mg/dl   POCT Glucose    Collection Time: 01/23/21 12:16 AM   Result Value Ref Range    POC Glucose 187 (H) 70 - 99 MG/DL   CBC    Collection Time: 01/23/21  5:45 AM   Result Value Ref Range    WBC 22.1 (H) 4.0 - 10.5 K/CU MM    RBC 2.42 (L) 4.6 - 6.2 M/CU MM    Hemoglobin 7.7 (L) 13.5 - 18.0 GM/DL    Hematocrit 23.2 (L) 42 - 52 %    MCV 95.9 78 - 100 FL    MCH 31.8 (H) 27 - 31 PG    MCHC 33.2 32.0 - 36.0 %    RDW 14.7 11.7 - 14.9 %    MPV 11.4 (H) 7.5 - 11.1 FL   D-Dimer, Quantitative    Collection Time: 01/23/21  5:45 AM   Result Value Ref Range    D-Dimer, Quant 865 (H) <230 NG/mL(DDU)   Fibrinogen    Collection Time: 01/23/21  5:45 AM   Result Value Ref Range    Fibrinogen 653 (H) 196.9 - 442.1 MG/DL   Procalcitonin    Collection Time: 01/23/21  5:45 AM   Result Value Ref Range    Procalcitonin 2.57    C-reactive protein    Collection Time: 01/23/21  5:45 AM   Result Value Ref Range    CRP, High Sensitivity >300.0 mg/L   Comprehensive Metabolic Panel w/ Reflex to MG    Collection Time: 01/23/21  5:45 AM   Result Value Ref Range    Sodium 126 (L) 135 - 145 MMOL/L    Potassium 5.4 (H) 3.5 - 5.1 MMOL/L    Chloride 94 (L) 99 - 110 mMol/L    CO2 23 21 - 32 MMOL/L    BUN 37 (H) 6 - 23 MG/DL    CREATININE 1.8 (H) 0.9 - 1.3 MG/DL    Glucose 179 (H) 70 - 99 MG/DL    Calcium 7.3 (L) 8.3 - 10.6 MG/DL    Alb 1.9 (L) 3.4 - 5.0 GM/DL    Total Protein 4.3 (L) 6.4 - 8.2 GM/DL    Total Bilirubin 0.9 0.0 - 1.0 MG/DL    ALT 13 10 - 40 U/L    AST 25 15 - 37 IU/L    Alkaline Phosphatase 37 (L) 40 - 129 IU/L    GFR Non- 37 (L) >60 mL/min/1.73m2    GFR  45 (L) >60 mL/min/1.73m2    Anion Gap 9 4 - 16   Calcium, Ionized    Collection Time: 01/23/21  5:45 AM   Result Value Ref Range    Ionized Ca 1.01 (L) 1.12 - 1.32 mMOL/L    Calcium, Ion 4.04 (L) 4.48 - 5.28 MG/DL   Magnesium    Collection Time: 01/23/21  5:45 AM   Result Value Ref Range    Magnesium 2.3 1.8 - 2.4 mg/dl   Phosphorus    Collection Time: 01/23/21  5:45 AM   Result Value Ref Range    Phosphorus 3.7 2.5 - 4.9 MG/DL   Blood gas, arterial    Collection Time: 01/23/21  6:00 AM   Result Value Ref Range    pH, Bld 7.29 (L) 7.34 - 7.45    pCO2, Arterial 46.0 (H) 32 - 45 MMHG    pO2, Arterial 80 75 - 100 MMHG    Base Excess 5 (H) 0 - 3.3    HCO3, Arterial 22.1 18 - 23 MMOL/L    CO2 Content 23.5 19 - 24 MMOL/L    O2 Sat 94.6 (L) 96 - 97 %    Carbon Monoxide, Blood 2.1 0 - 5 %    Methemoglobin, Arterial 1.2 <1.5 %    Comment ACVC 16 550 70% +10    POCT Glucose    Collection Time: 01/23/21  6:03 AM   Result Value Ref Range    POC Glucose 185 (H) 70 - 99 MG/DL   POCT Glucose    Collection Time: 01/23/21  9:54 AM   Result Value Ref Range    POC Glucose 190 (H) 70 - 99 MG/DL     CULTURE results: Invalid input(s): BLOOD CULTURE,  URINE CULTURE, SURGICAL CULTURE    Diagnosis:  Patient Active Problem List   Diagnosis    COVID-19    Pneumonia due to severe acute respiratory syndrome coronavirus 2 (SARS-CoV-2)    Acute respiratory failure with hypoxia (HCC)    ANASTASIA (acute kidney injury) ([facility]       Active Problems  Principal Problem:    COVID-19  Active Problems:    Pneumonia due to severe acute respiratory syndrome coronavirus 2 (SARS-CoV-2)    Acute respiratory failure with hypoxia (HCC)    ANASTASIA (acute kidney injury) ([facility]  Resolved Problems:    * No resolved hospital problems. *      Impression and plan   Summary and rationale: Patient is a 67 y.o.  male T2DM,CAD, HLD, HTN, AMINA on CPAP, AAA, BMI 46 kg/m2 who was admitted 1/8/2021 for further evaluation and management of SOB that started on 1/6/2021. He had associated, fatigue, myalgia, cough. Tested positive for SARS-CoV-2 by NAAT on 1/8/2021. Rapidly worsened and was intubated on 1/13/2021.  Critical COVID-19 pneumonia, respiratory failure, ANASTASIA, elevated bilirubin, COVID-associated coagulopathy   Clinical status: remains critical, some reduction in pct   Therapeutic:  o Ongoing antibiotics:continue meropenem on 1/22, and minocycline  o Antiviral agent: remdesivir 1/9-13  o Anti-inflammatory agents:  - Dexamethasone:1/8-19  - Solu-Medrol:1/19-  o Completed antibiotics: vancomycin 1/11-22, cefepime 1/11-22  o Convalescent plasma receipt:  o Other agents:    Diagnostic: repeat respiratory cx   F/u:BDG, Aspergillus Ag, IL-6   Other:      Electronically signed by: Electronically signed by Veronika Arriaga MD on 1/23/2021 at 11:14 AM

## 2021-01-24 NOTE — PROGRESS NOTES
Nephrology Progress Note  1/24/2021 10:05 AM        Subjective:   Admit Date: 1/8/2021  PCP: Alfredo Perez MD    Interval History: remains on vent - CKRT - pressor     Diet: TF per NG     ROS:  Intubated - AC FIO2 higher 9-0 % now - PEEP higher 14 now - pl pressure 35 also higher   Anuric   On NE but lower dose   CKRT running ok   With Nimbex     Data:     Current meds:    insulin glargine  30 Units Subcutaneous Nightly    insulin NPH  20 Units Subcutaneous QAM    meropenem  1,000 mg Intravenous Q12H    minocycline  100 mg Oral BID    methylPREDNISolone  40 mg Intravenous Q6H    heparin (porcine)  5,000 Units Subcutaneous 3 times per day    metoclopramide  10 mg Intravenous TID    albuterol sulfate HFA  4 puff Inhalation Q4H    And    ipratropium  4 puff Inhalation Q4H    insulin lispro  0-36 Units Subcutaneous Q4H    chlorhexidine  15 mL Mouth/Throat BID    famotidine (PEPCID) injection  20 mg Intravenous BID    miconazole   Topical BID    [Held by provider] apixaban  5 mg Oral BID    sodium chloride flush  10 mL Intravenous 2 times per day    sodium chloride flush  10 mL Intravenous 2 times per day    [Held by provider] atorvastatin  10 mg Oral Daily    [Held by provider] folic acid  1 mg Oral Daily    levothyroxine  75 mcg Oral Daily    [Held by provider] mesalamine  800 mg Oral TID    [Held by provider] pantoprazole  40 mg Oral QAM AC    aspirin  81 mg Oral Daily      heparin 5000 units CRRT syringe 0.8 mL/hr at 01/23/21 0904    midazolam 10 mg/hr (01/24/21 0735)    cisatracurium (NIMBEX) infusion 3 mcg/kg/min (01/24/21 0030)    dialysis builder 1,600 mL/hr at 01/24/21 0149    prismaSATE BGK 4/2.5 1,600 mL/hr (01/24/21 0545)    dialysis builder 200 mL/hr at 01/23/21 1926    norepinephrine 15 mcg/min (01/24/21 0946)    fentaNYL 200 mcg/hr (01/24/21 0220)    dextrose           I/O last 3 completed shifts:   In: 2210.7 [I.V.:1890.7; NG/GT:220; IV Piggyback:100]  Out: 0915 [Urine:53]    CBC:   Recent Labs     01/22/21  0521 01/23/21  0545 01/24/21  0607   WBC 20.0* 22.1* 20.9*   HGB 9.0* 7.7* 7.0*    254 118*          Recent Labs     01/22/21  1850 01/23/21  0545 01/23/21  1900   * 126* 127*   K 5.5* 5.4* 5.2*   CL 90* 94* 94*   CO2 22 23 24   BUN 56* 37* 34*   CREATININE 2.4* 1.8* 1.7*   GLUCOSE 179* 179* 167*       Lab Results   Component Value Date    CALCIUM 8.1 (L) 01/23/2021    PHOS 4.5 01/23/2021       Objective:     Vitals: BP (!) 102/46   Pulse 72   Temp 97.3 °F (36.3 °C) (Rectal)   Resp 20   Ht 5' 11.65\" (1.82 m)   Wt 294 lb 12.1 oz (133.7 kg)   SpO2 95%   BMI 40.36 kg/m²     General appearance:  Intubated - sedated with versed and fentanyl  HEENT:  ++ conj pallor  Neck:  Supple- Rt Ij HD cath-   Lungs:  + adv BS limited exam  Heart:  irregular  Abdomen: soft  Extremities:  ++ edema   Has zamora- also RUE PICC       Problem List :         Impression :     1. ANASTASIA- aneuric- sec to sever septic shock  2. Severe septic shock- CoVID 19 with ever ARDS and multiorgan dys Fx   3. DM/     Fluid overload- high K despite CKRT suggestive of ? tissue destruction     Recommendation/Plan  :     1. Overall poor prognosis   2. Keep CKRT   3. Ok to keep 4/2.5 to reduce K gradient - less arrhythmogenic   4. May need d/w family  5.  He is not improving  6. watch  For  any new complication  7. follow clinically       Chauncy Libman MD

## 2021-01-24 NOTE — PROGRESS NOTES
Progress Note( Dr. Rashid Settler)  1/24/2021  Subjective:   Admit Date: 1/8/2021  PCP: Letty Contreras MD    Admitted For :Hypoxia, shortness of breath, Covid pneumonia    Consulted For: Better control of blood glucose    Interval History: Patient is respiratory status got worse was sedated intubated and placed on ventilator on 1/13/2021  patient's blood glucose are better   Started on CRRT      Intake/Output Summary (Last 24 hours) at 1/24/2021 1655  Last data filed at 1/24/2021 1300  Gross per 24 hour   Intake 2281.6 ml   Output 4282 ml   Net -2000.4 ml       DATA    CBC:   Recent Labs     01/22/21  0521 01/23/21  0545 01/24/21  0607   WBC 20.0* 22.1* 20.9*   HGB 9.0* 7.7* 7.0*    254 118*    CMP:  Recent Labs     01/23/21  0545 01/23/21  1900 01/24/21  0607   * 127* 133*   K 5.4* 5.2* 5.7*   CL 94* 94* 100   CO2 23 24 23   BUN 37* 34* 29*   CREATININE 1.8* 1.7* 1.4*   CALCIUM 7.3* 8.1* 7.3*   PROT 4.3* 5.4* 4.2*   LABALBU 1.9* 2.4* 1.8*   BILITOT 0.9 1.0 0.6   ALKPHOS 37* 50 37*   AST 25 32 27   ALT 13 18 14     Lipids:   Lab Results   Component Value Date    CHOL 115 10/29/2012    HDL 39 10/29/2012    TRIG 80 10/29/2012     Glucose:  Recent Labs     01/24/21  0122 01/24/21  0613 01/24/21  1000   POCGLU 152* 163* 158*     [id number]:No results found for: LABA1C  High Sensitivity TSH:   Lab Results   Component Value Date    TSHHS 2.320 10/29/2012     Free T3: No results found for: FT3  Free T4:  Lab Results   Component Value Date    T4FREE 0.89 10/29/2012       Xr Chest Portable    Result Date: 1/8/2021  EXAMINATION: ONE XRAY VIEW OF THE CHEST 1/8/2021 6:12 pm COMPARISON: 02/17/2014 radiograph HISTORY: ORDERING SYSTEM PROVIDED HISTORY: sob \       No acute finding in the chest.     Ct Chest Pulmonary Embolism W Contrast    Result Date: 1/9/2021  EXAMINATION: CTA OF THE CHEST 1/9/2021 4:02 pm       1. No pulmonary embolism. 2. Pulmonary parenchymal findings typical of mild COVID-19 pneumonia.   Note that CT Objective:   Vitals: BP (!) 107/54   Pulse 74   Temp 99 °F (37.2 °C) (Rectal)   Resp 24   Ht 5' 11.65\" (1.82 m)   Wt 294 lb 12.1 oz (133.7 kg)   SpO2 94%   BMI 40.36 kg/m²   General appearance: Sedated, intubated and on ventilator   neck: no JVD or bruit  Thyroid : Normal lobes   Lungs: Has Vesicular Breath sounds has some wheezing and some rales intubated and on ventilator  Heart:  regular rate and rhythm  Abdomen: soft, non-tender; bowel sounds normal; no masses,  no organomegaly  Musculoskeletal: Normal  Extremities: extremities normal, , no edema  Neurologic: Sedated, intubated and on ventilator    Assessment:     Patient Active Problem List:     COVID-19       Pneumonia        Diabetes mellitus        Hypertension        Hyperlipidemia        Obstructive sleep apnea        Obesity      Plan:     1. Reviewed POC blood glucose . Labs and X ray results   2. Reviewed Current Medicines   3. On  Correction bolus Humalog/ Basal Lantus Insulin regime and also NPH insulin  4. Monitor Blood glucose frequently   5. Modified  the dose of Insulin/ other medicines as needed   6. Also on CRRT now  7. Will follow   8. The prognosis of this patient is guarded    .      Marry Borden MD

## 2021-01-24 NOTE — PROGRESS NOTES
Pulmonary and Critical Care  Progress Note    Subjective: The patient is doing poorly. Shortness of breath none. Chest pain none  Addressing respiratory complaints Patient is negative for  hemoptysis and cyanosis  CONSTITUTIONAL:  negative for fevers and chills      Past Medical History:     has a past medical history of Abdominal aortic aneurysm (AAA) without rupture ([facility] Anxiety, Diabetes mellitus ([facility] Edema, Hyperlipidemia, MI (myocardial infarction) ([facility] AMINA on CPAP, and Ulcerative colitis ([facility] has a past surgical history that includes Coronary artery bypass graft (1995); Tonsillectomy; and IR NONTUNNELED VASCULAR CATHETER > 5 YEARS (1/20/2021). reports that he does not drink alcohol or use drugs. Family history:  family history is not on file. No Known Allergies  Social History:    Reviewed; no changes    Objective:   PHYSICAL EXAM:        VITALS:  BP (!) 112/49   Pulse 59   Temp 97.3 °F (36.3 °C) (Rectal)   Resp 20   Ht 5' 11.65\" (1.82 m)   Wt 294 lb 12.1 oz (133.7 kg)   SpO2 94%   BMI 40.36 kg/m²     24HR INTAKE/OUTPUT:      Intake/Output Summary (Last 24 hours) at 1/24/2021 1338  Last data filed at 1/24/2021 1300  Gross per 24 hour   Intake 2281.6 ml   Output 4580 ml   Net -2298.4 ml       CONSTITUTIONAL:  Sedated on vent. LUNGS:  decreased breath sounds, basilar crackles. CARDIOVASCULAR:  normal S1 and S2 and negative JVD  ABD:Abdomen soft, non-tender. BS normal. No masses,  No organomegaly  NEURO:Sedated.   DATA:    CBC:  Recent Labs     01/22/21  0521 01/23/21  0545 01/24/21  0607   WBC 20.0* 22.1* 20.9*   RBC 2.82* 2.42* 2.27*   HGB 9.0* 7.7* 7.0*   HCT 26.7* 23.2* 22.2*    254 118*   MCV 94.7 95.9 97.8   MCH 31.9* 31.8* 30.8   MCHC 33.7 33.2 31.5*   RDW 14.3 14.7 15.2*      BMP:  Recent Labs     01/23/21  0545 01/23/21  1900 01/24/21  0607   * 127* 133*   K 5.4* 5.2* 5.7*   CL 94* 94* 100   CO2 23 24 23   BUN 37* 34* 29*   CREATININE 1.8* 1.7* 1.4*   CALCIUM 7.3* 8. 1* 7.3*   GLUCOSE 179* 167* 162*      ABG:  Recent Labs     01/23/21  1530 01/24/21  0515 01/24/21  0600   PH 7.24* 7.17* 7.16*   PO2ART 84 59* 33*   RRF1OWM 56.0* 75.0* 78.0*   O2SAT 94.2* 87.5* 58.9*     Lab Results   Component Value Date    PROBNP 5,607 (H) 01/19/2021    PROBNP 4,182 (H) 01/08/2021     No results found for: [facility]    Radiology Review:  Pertinent images / reports were reviewed as a part of this visit. Assessment:     Patient Active Problem List   Diagnosis    COVID-19    Pneumonia due to severe acute respiratory syndrome coronavirus 2 (SARS-CoV-2)    Acute respiratory failure with hypoxia (HCC)    ANASTASIA (acute kidney injury) ([facility]       Plan:   1. Overall the patient is worse  2. Inc. A/C to 24.  3. Repeat ABGs in 2 hrs.     Jennie Nath MD  1/24/2021  1:38 PM

## 2021-01-24 NOTE — PROGRESS NOTES
Per ABG results 1/24/21 at 0515 I increased VT from 560 to 600 and FIO2 from 80 to 90%. Please note that ABG results from 1/24/21 at 0600 are mixed venous. Discussed with nurse.

## 2021-01-24 NOTE — FLOWSHEET NOTE
RN in patient chart to complete/document hourly CRRT intake readings and vitals for primary RN Samantha.

## 2021-01-24 NOTE — PLAN OF CARE
Problem: Airway Clearance - Ineffective  Goal: Achieve or maintain patent airway  Outcome: Ongoing     Problem: Gas Exchange - Impaired  Goal: Absence of hypoxia  Outcome: Ongoing  Goal: Promote optimal lung function  Outcome: Ongoing     Problem: Breathing Pattern - Ineffective  Goal: Ability to achieve and maintain a regular respiratory rate  Outcome: Ongoing     Problem:  Body Temperature -  Risk of, Imbalanced  Goal: Ability to maintain a body temperature within defined limits  Outcome: Ongoing  Goal: Will regain or maintain usual level of consciousness  Outcome: Ongoing  Goal: Complications related to the disease process, condition or treatment will be avoided or minimized  Outcome: Ongoing     Problem: Isolation Precautions - Risk of Spread of Infection  Goal: Prevent transmission of infection  Outcome: Ongoing     Problem: Nutrition Deficits  Goal: Optimize nutritional status  Outcome: Ongoing     Problem: Risk for Fluid Volume Deficit  Goal: Maintain normal heart rhythm  Outcome: Ongoing  Goal: Maintain absence of muscle cramping  Outcome: Ongoing  Goal: Maintain normal serum potassium, sodium, calcium, phosphorus, and pH  Outcome: Ongoing     Problem: Loneliness or Risk for Loneliness  Goal: Demonstrate positive use of time alone when socialization is not possible  Outcome: Ongoing     Problem: Fatigue  Goal: Verbalize increase energy and improved vitality  Outcome: Ongoing     Problem: Patient Education: Go to Patient Education Activity  Goal: Patient/Family Education  Outcome: Ongoing     Problem: Skin Integrity:  Goal: Will show no infection signs and symptoms  Description: Will show no infection signs and symptoms  Outcome: Ongoing  Goal: Absence of new skin breakdown  Description: Absence of new skin breakdown  Outcome: Ongoing     Problem: Falls - Risk of:  Goal: Will remain free from falls  Description: Will remain free from falls  Outcome: Ongoing  Goal: Absence of physical injury  Description: Absence of physical injury  Outcome: Ongoing     Problem: Restraint Use - Nonviolent/Non-Self-Destructive Behavior:  Goal: Absence of restraint indications  Description: Absence of restraint indications  Outcome: Ongoing  Goal: Absence of restraint-related injury  Description: Absence of restraint-related injury  Outcome: Ongoing     Problem: Gas Exchange - Impaired:  Goal: Levels of oxygenation will improve  Description: Levels of oxygenation will improve  Outcome: Ongoing     Problem: Fluid Volume:  Goal: Ability to achieve a balanced intake and output will improve  Description: Ability to achieve a balanced intake and output will improve  Outcome: Ongoing     Problem: Physical Regulation:  Goal: Ability to maintain clinical measurements within normal limits will improve  Description: Ability to maintain clinical measurements within normal limits will improve  Outcome: Ongoing  Goal: Will show no signs and symptoms of electrolyte imbalance  Description: Will show no signs and symptoms of electrolyte imbalance  Outcome: Ongoing  Goal: Complications related to the disease process, condition or treatment will be avoided or minimized  Description: Complications related to the disease process, condition or treatment will be avoided or minimized  Outcome: Ongoing     Problem: Fluid Volume - Imbalance:  Goal: Absence of imbalanced fluid volume signs and symptoms  Description: Absence of imbalanced fluid volume signs and symptoms  Outcome: Ongoing     Problem: Cardiac:  Goal: Ability to maintain vital signs within normal range will improve  Description: Ability to maintain vital signs within normal range will improve  Outcome: Ongoing  Goal: Cardiovascular alteration will improve  Description: Cardiovascular alteration will improve  Outcome: Ongoing     Problem: Health Behavior:  Goal: Will modify at least one risk factor affecting health status  Description: Will modify at least one risk factor affecting health status  Outcome: Ongoing  Goal: Identification of resources available to assist in meeting health care needs will improve  Description: Identification of resources available to assist in meeting health care needs will improve  Outcome: Ongoing     Problem: Discharge Planning:  Goal: Discharged to appropriate level of care  Description: Discharged to appropriate level of care  Outcome: Ongoing

## 2021-01-24 NOTE — PLAN OF CARE
Problem: Airway Clearance - Ineffective  Goal: Achieve or maintain patent airway  1/23/2021 2227 by Eden Davis RN  Outcome: Ongoing  1/23/2021 1956 by Tayler Greenberg RN  Outcome: Ongoing     Problem: Gas Exchange - Impaired  Goal: Absence of hypoxia  1/23/2021 2227 by Eden Davis RN  Outcome: Ongoing  1/23/2021 1956 by Tayler Greenberg RN  Outcome: Ongoing  Goal: Promote optimal lung function  1/23/2021 2227 by Eden Davis RN  Outcome: Ongoing  1/23/2021 1956 by Tayler Greenberg RN  Outcome: Ongoing     Problem: Breathing Pattern - Ineffective  Goal: Ability to achieve and maintain a regular respiratory rate  1/23/2021 2227 by Eden Davis RN  Outcome: Ongoing  1/23/2021 1956 by Tayler Greenberg RN  Outcome: Ongoing     Problem:  Body Temperature -  Risk of, Imbalanced  Goal: Ability to maintain a body temperature within defined limits  1/23/2021 2227 by Eden Davis RN  Outcome: Ongoing  1/23/2021 1956 by Tayler Greenberg RN  Outcome: Ongoing  Goal: Will regain or maintain usual level of consciousness  1/23/2021 2227 by Eden Davis RN  Outcome: Ongoing  1/23/2021 1956 by Tayler Greenberg RN  Outcome: Ongoing  Goal: Complications related to the disease process, condition or treatment will be avoided or minimized  1/23/2021 2227 by Eden Davis RN  Outcome: Ongoing  1/23/2021 1956 by Tayler Greenberg RN  Outcome: Ongoing     Problem: Isolation Precautions - Risk of Spread of Infection  Goal: Prevent transmission of infection  1/23/2021 2227 by Eden Davis RN  Outcome: Ongoing  1/23/2021 1956 by Tayler Greenberg RN  Outcome: Ongoing     Problem: Nutrition Deficits  Goal: Optimize nutritional status  1/23/2021 2227 by Eden Davis RN  Outcome: Ongoing  1/23/2021 1956 by Tayler Greenberg RN  Outcome: Ongoing     Problem: Risk for Fluid Volume Deficit  Goal: Maintain normal heart rhythm  1/23/2021 2227 by Eden Davis RN  Outcome: Ongoing  1/23/2021 1956 by Tayler Greenberg Levels of oxygenation will improve  1/23/2021 2227 by Cheryl Tyson RN  Outcome: Ongoing  1/23/2021 1956 by Marleni Mireles RN  Outcome: Ongoing     Problem: Physical Regulation:  Goal: Ability to maintain clinical measurements within normal limits will improve  Description: Ability to maintain clinical measurements within normal limits will improve  1/23/2021 2227 by Cheryl Tyson RN  Outcome: Ongoing  1/23/2021 1956 by Marleni Mireles RN  Outcome: Ongoing  Goal: Will show no signs and symptoms of electrolyte imbalance  Description: Will show no signs and symptoms of electrolyte imbalance  1/23/2021 2227 by Cheryl Tyson RN  Outcome: Ongoing  1/23/2021 1956 by Marleni Mireles RN  Outcome: Ongoing  Goal: Complications related to the disease process, condition or treatment will be avoided or minimized  Description: Complications related to the disease process, condition or treatment will be avoided or minimized  1/23/2021 2227 by Cheryl Tyson RN  Outcome: Ongoing  1/23/2021 1956 by Marleni Mireles RN  Outcome: Ongoing     Problem: Fluid Volume - Imbalance:  Goal: Absence of imbalanced fluid volume signs and symptoms  Description: Absence of imbalanced fluid volume signs and symptoms  1/23/2021 2227 by Cheryl Tyson RN  Outcome: Ongoing  1/23/2021 1956 by Marleni Mireles RN  Outcome: Ongoing     Problem: Cardiac:  Goal: Ability to maintain vital signs within normal range will improve  Description: Ability to maintain vital signs within normal range will improve  1/23/2021 2227 by Cheryl Tyson RN  Outcome: Ongoing  1/23/2021 1956 by Marleni Mireles RN  Outcome: Ongoing  Goal: Cardiovascular alteration will improve  Description: Cardiovascular alteration will improve  1/23/2021 2227 by Cheryl Tyson RN  Outcome: Ongoing  1/23/2021 1956 by Marleni Mireles RN  Outcome: Ongoing     Problem: Health Behavior:  Goal: Will modify at least one risk factor affecting health status  Description: Will modify at least one risk factor affecting health status  1/23/2021 2227 by Remedios Will RN  Outcome: Ongoing  1/23/2021 1956 by Lenise Osler, RN  Outcome: Ongoing  Goal: Identification of resources available to assist in meeting health care needs will improve  Description: Identification of resources available to assist in meeting health care needs will improve  1/23/2021 2227 by Remedios Will RN  Outcome: Ongoing  1/23/2021 1956 by Lenise Osler, RN  Outcome: Ongoing     Problem: Discharge Planning:  Goal: Discharged to appropriate level of care  Description: Discharged to appropriate level of care  1/23/2021 2227 by Remedios Will RN  Outcome: Ongoing  1/23/2021 1956 by Lenise Osler, RN  Outcome: Ongoing

## 2021-01-24 NOTE — PROGRESS NOTES
Hospitalist Progress Note      Name:  Mildred Conway /Age/Sex: 1945  (67 y.o. male)   MRN & CSN:  [id number] & [id number] Admission Date/Time: 2021  4:54 PM   Location:  -A PCP: Gifty Keane MD         Hospital Day: 17    Assessment and Plan:   Mildred Conway is a 67 y.o.  male  who presents with shortness of breath. · Acute respiratory failure with hypoxia secondary to COVID-19 viral pneumonia infection, concern for PE  · Septic shock due to viral pneumonia, possible bacterial superinfection  · COVID-19 positive: 2021  · IV antibiotics, IV steroids initiated on 2021   · Convalescent plasma given: 2021    · Remdesivir: 21  · Oxygen requirement : ventilated  still requiring high vent settings  · On pressors  · Ultrasound DVT negative, DC heparin infusion, continue prophylactic  · Pulmonology on board  · ID, dc vanc and cefepime, switch to leonel and minocycline , On Solu-Medrol      · A. Fib, rate controlled  Cardiology was consulted for possible junctional bradycardia, assessed as slow Afib  Cardiology on board    · ANASTASIA, likely prerenal, worsening  · Hyperkalemia  Avoid nephrotoxics , nephrology,  CRRT    · Diabetes mellitus type 2, poor control, sliding scale, Lantus, hypoglycemic protocol, endocrine on board     · Nutrition  On tube feeds, dietitian on board    Chronic medical conditions  · HFrEF , not in exacerbation  · Morbid obesity with BMI of 47, encourage weight loss  · HTN, medications on hold due to hypotension  · HLD, fenofibrate  · Chronic anxiety disorder, valium  · Hypothyroidism, synthroid     Prognosis guarded, spoke to Justin Starkey, coming to terms , hoping to hold on for a while, hopefully till Wednesday. Wants to change code status to [facility]. Diet DIET TUBE FEED CONTINUOUS/CYCLIC NPO; Low Calorie High Protein (Vital HP);  Nasogastric; Continuous; 10; 10; 24   DVT Prophylaxis [] Lovenox, []  Heparin, [] SCDs, []No VTE prophylaxis, patient ambulating   GI Prophylaxis [] PPI, [] H2 Blocker, [] No GI prophylaxis, patient is receiving diet/Tube Feeds   Code Status Full Code   Disposition Patient requires continued admission due to    MDM [] Low, [] Moderate,[]  High  Patient's risk as above due to      History of Present Illness:     Pt S&E. Intubated, sedated  10-14 point ROS reviewed negative, unless as noted above    Objective: Intake/Output Summary (Last 24 hours) at 1/24/2021 1227  Last data filed at 1/24/2021 1200  Gross per 24 hour   Intake 2281.6 ml   Output 4698 ml   Net -2416.4 ml      Vitals:   Vitals:    01/24/21 1129   BP:    Pulse: 71   Resp:    Temp:    SpO2: 94%     Physical Exam:    GEN Sedated, intubated. EYES Pupils are equally round. No scleral erythema, discharge, or conjunctivitis. HENT Mucous membranes are moist.  ET tube  NECK No apparent thyromegaly or masses. NG tube in situ  RESP crackles bilateral diminished BS, no wheezes, rales or rhonchi. Symmetric chest movement . CARDIO/VASC S1/S2 auscultated. Regular rate without appreciable murmurs, rubs, or gallops. Peripheral pulses equal bilaterally and palpable. No peripheral edema. GI Abdomen is soft without significant tenderness, masses, or guarding. Bowel sounds are normoactive. Rectal exam deferred.  Alan catheter is present. HEME/LYMPH No petechiae or ecchymoses. MSK No gross joint deformities. Spontaneous movement of all extremities  SKIN Normal coloration, warm, dry.   NEURO Cranial nerves appear grossly intact,On Vent    Medications:   Medications:    insulin glargine  30 Units Subcutaneous Nightly    insulin NPH  20 Units Subcutaneous QAM    meropenem  1,000 mg Intravenous Q12H    minocycline  100 mg Oral BID    methylPREDNISolone  40 mg Intravenous Q6H    heparin (porcine)  5,000 Units Subcutaneous 3 times per day    metoclopramide  10 mg Intravenous TID    albuterol sulfate HFA  4 puff Inhalation Q4H    And    ipratropium  4 puff Inhalation Q4H    insulin lispro  0-36 Units Subcutaneous Q4H    chlorhexidine  15 mL Mouth/Throat BID    famotidine (PEPCID) injection  20 mg Intravenous BID    miconazole   Topical BID    [Held by provider] apixaban  5 mg Oral BID    sodium chloride flush  10 mL Intravenous 2 times per day    sodium chloride flush  10 mL Intravenous 2 times per day    [Held by provider] atorvastatin  10 mg Oral Daily    [Held by provider] folic acid  1 mg Oral Daily    levothyroxine  75 mcg Oral Daily    [Held by provider] mesalamine  800 mg Oral TID    [Held by provider] pantoprazole  40 mg Oral QAM AC    aspirin  81 mg Oral Daily      Infusions:    dialysis builder      dialysis builder      prismaSATE BGK 2/0      heparin 5000 units CRRT syringe 0.8 mL/hr at 01/23/21 0904    midazolam 10 mg/hr (01/24/21 0735)    cisatracurium (NIMBEX) infusion 3 mcg/kg/min (01/24/21 0833)    norepinephrine 20 mcg/min (01/24/21 1101)    fentaNYL 200 mcg/hr (01/24/21 0749)    dextrose       PRN Meds:     heparin (porcine), 2,600 Units, PRN      potassium chloride, 20 mEq, PRN      magnesium sulfate, 1,000 mg, PRN      calcium gluconate, 1 g, PRN    Or      calcium gluconate, 2 g, PRN    Or      calcium gluconate, 3 g, PRN    Or      calcium gluconate, 4 g, PRN      sodium phosphate IVPB, 6 mmol, PRN    Or      sodium phosphate IVPB, 12 mmol, PRN    Or      sodium phosphate IVPB, 18 mmol, PRN    Or      sodium phosphate IVPB, 24 mmol, PRN      acetaminophen, 650 mg, Q6H PRN    Or      acetaminophen, 650 mg, Q6H PRN      heparin (porcine), 10,000 Units, PRN      heparin (porcine), 5,000 Units, PRN      polyvinyl alcohol, 1 drop, Q4H PRN    And      artificial tears, , Q4H PRN      sodium chloride flush, 10 mL, PRN      glucose, 15 g, PRN      dextrose, 12.5 g, PRN      glucagon (rDNA), 1 mg, PRN      dextrose, 100 mL/hr, PRN      albuterol sulfate HFA, 2 puff, Q6H PRN      sodium chloride flush, 10 mL, PRN      promethazine, 12.5 mg, Q6H PRN    Or      ondansetron, 4 mg, Q6H PRN      polyethylene glycol, 17 g, Daily PRN      guaiFENesin-dextromethorphan, 5 mL, Q4H PRN      diazePAM, 5 mg, TID PRN      sodium chloride, 30 mL, PRN        Data    Recent Labs     01/22/21  0521 01/23/21  0545 01/24/21  0607   WBC 20.0* 22.1* 20.9*   HGB 9.0* 7.7* 7.0*   HCT 26.7* 23.2* 22.2*    254 118*      Recent Labs     01/23/21  0545 01/23/21  1900 01/24/21  0607   * 127* 133*   K 5.4* 5.2* 5.7*   CL 94* 94* 100   CO2 23 24 23   PHOS 3.7 4.5 4.5   BUN 37* 34* 29*   CREATININE 1.8* 1.7* 1.4*     Recent Labs     01/23/21  0545 01/23/21  1900 01/24/21  0607   AST 25 32 27   ALT 13 18 14   BILITOT 0.9 1.0 0.6   ALKPHOS 37* 50 37*     No results for input(s): INR in the last 72 hours. No results for input(s): CKTOTAL, CKMB, CKMBINDEX, TROPONINI in the last 72 hours.         Electronically signed by Laurita Herrmann MD on 1/24/2021 at 12:27 PM

## 2021-01-24 NOTE — PROGRESS NOTES
PC from lab reporting a critical potassium level of 5.7. Pt's lab values sent to Dr. Hunter Underwood. Stated to switch CRRT solution 2K/0Ca. PC to pharmacy to update order and request new bags.

## 2021-01-24 NOTE — PROGRESS NOTES
I called this patient's son Agustin Jim this morning to give him a daily update. He said that he had asked someone to put a note in asking that he be called between 6-8 PM each day, but he said he really does not like to be called and woke up every morning, unless it is an emergency. He said if it is an emergency we can call and wake him up anytime, but otherwise he does not want to be called for morning updates, he said when he wakes up each morning, he will call himself for an update. But he would like to continue to get 6-8 pm updates from the staff.

## 2021-01-24 NOTE — PROGRESS NOTES
CKRT Note     Pt seen on CKRT  .  [place]    Access : Rt IJ temp HD cath    BP : 104/45    Blood flow: 150 ml/min     Pressor : NE     EF : 24 ml/kg/h  UFR : 11 ml/kg/h    FF : 27  %     AC : pre filter heparin

## 2021-01-24 NOTE — PROGRESS NOTES
We moved this patient from room 2011 to room 2007. I brought all his belongings (3 large bags, I placed patient labels on them all) to this room and placed them in room 2007 locker.  The things that were in the room 2011 safe, I placed them in a small bag with a patient label, and I put the small bag in one of his belonging bag (placed it in his shoe)

## 2021-01-25 NOTE — PROGRESS NOTES
Hospitalist Progress Note      Name:  Natalio Ray /Age/Sex: 1945  (67 y.o. male)   MRN & CSN:  [id number] & [id number] Admission Date/Time: 2021  4:54 PM   Location:  -A PCP: Sadie Randall MD         Hospital Day: 18    Assessment and Plan:   Natalio Ray is a 67 y.o.  male  who presents with shortness of breath. · Acute respiratory failure with hypoxia secondary to COVID-19 viral pneumonia infection, concern for PE  · Septic shock due to viral pneumonia, possible bacterial superinfection  · COVID-19 positive: 2021  · IV antibiotics, IV steroids initiated on 2021   · Convalescent plasma given: 2021    · Remdesivir: 21  · Oxygen requirement : ventilated  still requiring high vent settings  · On pressors  · Ultrasound DVT negative, DC heparin infusion, continue prophylactic  · Pulmonology on board  · ID, dc vanc and cefepime, switch to leonel and minocycline , On Solu-Medrol      · A. Fib, rate controlled  Cardiology was consulted for possible junctional bradycardia, assessed as slow Afib  Cardiology on board    · ANASTASIA, likely prerenal, worsening  · Metabolic acidosis  · Hyperkalemia  Avoid nephrotoxics , nephrology,  CRRT, bicarb    · Acute blood loss anemia, multifactorial, underlying illness, CRRT  Transfuse for hb <7, monitor    · Diabetes mellitus type 2, poor control, sliding scale, Lantus, hypoglycemic protocol, endocrine on board     · Nutrition  On tube feeds, dietitian on board    Chronic medical conditions  · HFrEF , not in exacerbation  · Morbid obesity with BMI of 47, encourage weight loss  · HTN, medications on hold due to hypotension  · HLD, fenofibrate  · Chronic anxiety disorder, valium  · Hypothyroidism, synthroid     Prognosis guarded, spoke to Kavin Boyle, coming to terms , hoping to hold on for a while, hopefully till Wednesday. Code status to [facility].  Palliative care on board  Patient is critical  Diet DIET TUBE FEED CONTINUOUS/CYCLIC NPO; Low Calorie Subcutaneous 3 times per day    metoclopramide  10 mg Intravenous TID    albuterol sulfate HFA  4 puff Inhalation Q4H    And    ipratropium  4 puff Inhalation Q4H    insulin lispro  0-36 Units Subcutaneous Q4H    chlorhexidine  15 mL Mouth/Throat BID    famotidine (PEPCID) injection  20 mg Intravenous BID    miconazole   Topical BID    [Held by provider] apixaban  5 mg Oral BID    sodium chloride flush  10 mL Intravenous 2 times per day    sodium chloride flush  10 mL Intravenous 2 times per day    [Held by provider] atorvastatin  10 mg Oral Daily    [Held by provider] folic acid  1 mg Oral Daily    levothyroxine  75 mcg Oral Daily    [Held by provider] mesalamine  800 mg Oral TID    [Held by provider] pantoprazole  40 mg Oral QAM AC    aspirin  81 mg Oral Daily      Infusions:    IV infusion builder 100 mL/hr at 01/25/21 1231    dialysis builder 200 mL/hr at 01/24/21 1600    dialysis builder 1,600 mL/hr at 01/25/21 0958    prismaSATE BGK 2/0 1,600 mL/hr (01/25/21 0952)    heparin 5000 units CRRT syringe 0.8 mL/hr at 01/24/21 1924    midazolam 10 mg/hr (01/25/21 0540)    cisatracurium (NIMBEX) infusion 3 mcg/kg/min (01/25/21 0953)    norepinephrine 21 mcg/min (01/25/21 1228)    fentaNYL 200 mcg/hr (01/25/21 1132)    dextrose       PRN Meds:     heparin (porcine), 2,600 Units, PRN      potassium chloride, 20 mEq, PRN      magnesium sulfate, 1,000 mg, PRN      calcium gluconate, 1 g, PRN    Or      calcium gluconate, 2 g, PRN    Or      calcium gluconate, 3 g, PRN    Or      calcium gluconate, 4 g, PRN      sodium phosphate IVPB, 6 mmol, PRN    Or      sodium phosphate IVPB, 12 mmol, PRN    Or      sodium phosphate IVPB, 18 mmol, PRN    Or      sodium phosphate IVPB, 24 mmol, PRN      acetaminophen, 650 mg, Q6H PRN    Or      acetaminophen, 650 mg, Q6H PRN      heparin (porcine), 10,000 Units, PRN      heparin (porcine), 5,000 Units, PRN      polyvinyl alcohol, 1 drop, Q4H PRN    And      artificial tears, , Q4H PRN      sodium chloride flush, 10 mL, PRN      glucose, 15 g, PRN      dextrose, 12.5 g, PRN      glucagon (rDNA), 1 mg, PRN      dextrose, 100 mL/hr, PRN      albuterol sulfate HFA, 2 puff, Q6H PRN      sodium chloride flush, 10 mL, PRN      promethazine, 12.5 mg, Q6H PRN    Or      ondansetron, 4 mg, Q6H PRN      polyethylene glycol, 17 g, Daily PRN      guaiFENesin-dextromethorphan, 5 mL, Q4H PRN      diazePAM, 5 mg, TID PRN      sodium chloride, 30 mL, PRN        Data    Recent Labs     01/23/21  0545 01/24/21  0607 01/25/21  0550   WBC 22.1* 20.9* 24.6*   HGB 7.7* 7.0* 7.4*   HCT 23.2* 22.2* 24.0*    118* 161      Recent Labs     01/24/21  0607 01/24/21  1715 01/25/21  0550   * 129* 129*   K 5.7* 5.1 4.2    94* 92*   CO2 23 23 17*   PHOS 4.5 3.8 2.3*   BUN 29* 34* 21   CREATININE 1.4* 1.7* 1.2     Recent Labs     01/24/21  0607 01/24/21  1715 01/25/21  0550   AST 27 25 23   ALT 14 19 16   BILITOT 0.6 0.8 0.4   ALKPHOS 37* 50 35*     No results for input(s): INR in the last 72 hours. No results for input(s): CKTOTAL, CKMB, CKMBINDEX, TROPONINI in the last 72 hours.         Electronically signed by Willian Haro MD on 1/25/2021 at 12:43 PM

## 2021-01-25 NOTE — PROGRESS NOTES
I am not going to call this patient's son Naomi Lopes this morning to give him a shift update, he told me yesterday that he did not want to be called in the mornings and woke up unless it is an emergency. He said he would call and get an update when he wakes up each morning.

## 2021-01-25 NOTE — PROGRESS NOTES
All this patient's belongings were sent home with this patient's son Knute Holter, who was granted permission to come to bedside for a visit. Angel asked that we leave this patient's glasses at bedside incase this patient does wake up and get off the vent. All other belongings sent home with son Knute Holter.

## 2021-01-25 NOTE — FLOWSHEET NOTE
Dr. Michael Hutton at bedside, aware BP dropping, increasing levo drip, also 12 beat run of vtach. Made aware of T4 level.

## 2021-01-25 NOTE — PLAN OF CARE
Problem: Airway Clearance - Ineffective  Goal: Achieve or maintain patent airway  1/25/2021 1008 by Brianna Monte RN  Outcome: Ongoing  1/24/2021 2343 by Meg Merlos RN  Outcome: Ongoing     Problem: Gas Exchange - Impaired  Goal: Absence of hypoxia  1/25/2021 1008 by Brianna Mnote RN  Outcome: Ongoing  1/24/2021 2343 by Meg Merlos RN  Outcome: Ongoing  Goal: Promote optimal lung function  1/25/2021 1008 by Brianna Monte RN  Outcome: Ongoing  1/24/2021 2343 by Meg Merlos RN  Outcome: Ongoing     Problem: Breathing Pattern - Ineffective  Goal: Ability to achieve and maintain a regular respiratory rate  1/25/2021 1008 by Brianna Monte RN  Outcome: Ongoing  1/24/2021 2343 by Meg Merlos RN  Outcome: Ongoing     Problem:  Body Temperature -  Risk of, Imbalanced  Goal: Ability to maintain a body temperature within defined limits  1/25/2021 1008 by Brianna Monte RN  Outcome: Ongoing  1/24/2021 2343 by Meg Merlos RN  Outcome: Ongoing  Goal: Will regain or maintain usual level of consciousness  1/25/2021 1008 by Brianna Monte RN  Outcome: Ongoing  1/24/2021 2343 by Meg Merlos RN  Outcome: Ongoing  Goal: Complications related to the disease process, condition or treatment will be avoided or minimized  1/25/2021 1008 by Brianna Monte RN  Outcome: Ongoing  1/24/2021 2343 by Meg Merlos RN  Outcome: Ongoing     Problem: Isolation Precautions - Risk of Spread of Infection  Goal: Prevent transmission of infection  1/25/2021 1008 by Brianna Monte RN  Outcome: Ongoing  1/24/2021 2343 by Meg Merlos RN  Outcome: Ongoing     Problem: Nutrition Deficits  Goal: Optimize nutritional status  1/25/2021 1008 by Brianna Monte RN  Outcome: Ongoing  1/24/2021 2343 by Meg Merlos RN  Outcome: Ongoing     Problem: Risk for Fluid Volume Deficit  Goal: Maintain normal heart rhythm  1/25/2021 1008 by Brianna Monte RN  Outcome: Ongoing  1/24/2021 2343 by Meg Merlos RN  Outcome: Ongoing  Goal: Maintain absence of muscle cramping  1/25/2021 1008 by Wilda Serrato RN  Outcome: Ongoing  1/24/2021 2343 by Erendira White RN  Outcome: Ongoing  Goal: Maintain normal serum potassium, sodium, calcium, phosphorus, and pH  1/25/2021 1008 by Wilda Serrato RN  Outcome: Ongoing  1/24/2021 2343 by Erendira White RN  Outcome: Ongoing     Problem: Loneliness or Risk for Loneliness  Goal: Demonstrate positive use of time alone when socialization is not possible  1/25/2021 1008 by Wilda Serrato RN  Outcome: Ongoing  1/24/2021 2343 by Erendira White RN  Outcome: Ongoing     Problem: Fatigue  Goal: Verbalize increase energy and improved vitality  1/25/2021 1008 by Wilda Serrato RN  Outcome: Ongoing  1/24/2021 2343 by Erendira White RN  Outcome: Ongoing     Problem: Patient Education: Go to Patient Education Activity  Goal: Patient/Family Education  1/25/2021 1008 by Wilda Serrato RN  Outcome: Ongoing  1/24/2021 2343 by Erendira White RN  Outcome: Ongoing     Problem: Skin Integrity:  Goal: Will show no infection signs and symptoms  Description: Will show no infection signs and symptoms  1/25/2021 1008 by Wilda Serrato RN  Outcome: Ongoing  1/24/2021 2343 by Erendira White RN  Outcome: Ongoing  Goal: Absence of new skin breakdown  Description: Absence of new skin breakdown  1/25/2021 1008 by Wilda Serrato RN  Outcome: Ongoing  1/24/2021 2343 by Erendira White RN  Outcome: Ongoing     Problem: Falls - Risk of:  Goal: Will remain free from falls  Description: Will remain free from falls  1/25/2021 1008 by Wilda Serrato RN  Outcome: Ongoing  1/24/2021 2343 by Erendira White RN  Outcome: Ongoing  Goal: Absence of physical injury  Description: Absence of physical injury  1/25/2021 1008 by Wilda Serrato RN  Outcome: Ongoing  1/24/2021 2343 by Erendira White RN  Outcome: Ongoing     Problem: Restraint Use - Nonviolent/Non-Self-Destructive Behavior:  Goal: Absence of restraint indications  Description: Absence of restraint indications  1/25/2021 1008 by Ester Allred RN  Outcome: Ongoing  1/24/2021 2343 by Charisse Jacob RN  Outcome: Ongoing  Goal: Absence of restraint-related injury  Description: Absence of restraint-related injury  1/25/2021 1008 by Ester Allred RN  Outcome: Ongoing  1/24/2021 2343 by Charisse Jacob RN  Outcome: Ongoing     Problem: Gas Exchange - Impaired:  Goal: Levels of oxygenation will improve  Description: Levels of oxygenation will improve  1/25/2021 1008 by Ester Allred RN  Outcome: Ongoing  1/24/2021 2343 by Charisse Jacob RN  Outcome: Ongoing     Problem: Fluid Volume:  Goal: Ability to achieve a balanced intake and output will improve  Description: Ability to achieve a balanced intake and output will improve  1/25/2021 1008 by Ester Allred RN  Outcome: Ongoing  1/24/2021 2343 by Charisse Jacob RN  Outcome: Ongoing     Problem: Physical Regulation:  Goal: Ability to maintain clinical measurements within normal limits will improve  Description: Ability to maintain clinical measurements within normal limits will improve  1/25/2021 1008 by Ester Allred RN  Outcome: Ongoing  1/24/2021 2343 by Charisse Jacob RN  Outcome: Ongoing  Goal: Will show no signs and symptoms of electrolyte imbalance  Description: Will show no signs and symptoms of electrolyte imbalance  1/25/2021 1008 by Ester Allred RN  Outcome: Ongoing  1/24/2021 2343 by Charisse Jacob RN  Outcome: Ongoing  Goal: Complications related to the disease process, condition or treatment will be avoided or minimized  Description: Complications related to the disease process, condition or treatment will be avoided or minimized  1/25/2021 1008 by Ester Allred RN  Outcome: Ongoing  1/24/2021 2343 by Charisse Jacob RN  Outcome: Ongoing     Problem: Fluid Volume - Imbalance:  Goal: Absence of imbalanced fluid volume signs and symptoms  Description: Absence of imbalanced fluid volume signs and symptoms  1/25/2021 1008 by Benji Salinas RN  Outcome: Ongoing  1/24/2021 2343 by Nico Sanchez RN  Outcome: Ongoing     Problem: Cardiac:  Goal: Ability to maintain vital signs within normal range will improve  Description: Ability to maintain vital signs within normal range will improve  1/25/2021 1008 by Benji Salinas RN  Outcome: Ongoing  1/24/2021 2343 by Nico Sanchez RN  Outcome: Ongoing  Goal: Cardiovascular alteration will improve  Description: Cardiovascular alteration will improve  1/25/2021 1008 by Benji Salinas RN  Outcome: Ongoing  1/24/2021 2343 by Nico Sanchez RN  Outcome: Ongoing     Problem: Health Behavior:  Goal: Will modify at least one risk factor affecting health status  Description: Will modify at least one risk factor affecting health status  1/25/2021 1008 by Benji Salinas RN  Outcome: Ongoing  1/24/2021 2343 by Nico Sanchez RN  Outcome: Ongoing  Goal: Identification of resources available to assist in meeting health care needs will improve  Description: Identification of resources available to assist in meeting health care needs will improve  1/25/2021 1008 by Benji Salinas RN  Outcome: Ongoing  1/24/2021 2343 by Nico Sanchez RN  Outcome: Ongoing     Problem: Discharge Planning:  Goal: Discharged to appropriate level of care  Description: Discharged to appropriate level of care  1/25/2021 1008 by Benji Salinas RN  Outcome: Ongoing  1/24/2021 2343 by Nico Sanchez RN  Outcome: Ongoing

## 2021-01-25 NOTE — CARE COORDINATION
Received phone call from Iram at [facility]. She was inquiring on if patient is going to be discharged today. CM let her know that  patient has discharge orders. Iram asked for discharge orders to be faxed over.  DEB let her know that will fax them over once they are completed and she voiced understanding

## 2021-01-25 NOTE — PROGRESS NOTES
Infectious Disease Progress Note  2021   Patient Name: Paulino Goldmann : 1945       Reason for visit: F/u COVID-19 pneumonia, acute respiratory failure? History:? Interval history noted  Intubated, sedated and on mechanical ventilation  Physical Exam:  Vital Signs: BP 89/68   Pulse 122   Temp 97.3 °F (36.3 °C) (Rectal)   Resp 24   Ht 5' 11.65\" (1.82 m)   Wt 286 lb 9.6 oz (130 kg)   SpO2 92%   BMI 39.25 kg/m²     Gen: intubated and sedated  Skin: no stigmata of endocarditis  Wounds: C/D/I  HEMT: AT/NC ETT  Eyes: PERRLA. Neck: Supple. Trachea midline. No LAD. Chest:  transmitted breath sounds. Heart:   Abd: soft, non-distended, no tenderness, no hepatomegaly. Normoactive bowel sounds. Ext: no clubbing, cyanosis, or edema  Catheter Site: without erythema or tenderness  LDA: right arm PICC line, Urethral catheter:  Neuro: sedated and on the ventilator. Radiologic / Imaging / TESTING    Chest x-ray 2021   impression:  Endotracheal tube placement now lies 6.6 cm above the jalil. Similar appearing parenchymal infiltrates seen diffusely, with possible trace   right pleural effusion at the costophrenic angle.         Labs:    Recent Results (from the past 24 hour(s))   POCT Glucose    Collection Time: 21  9:22 PM   Result Value Ref Range    POC Glucose 139 (H) 70 - 99 MG/DL   Calcium, Ionized    Collection Time: 21 11:50 PM   Result Value Ref Range    Ionized Ca 0.73 (L) 1.12 - 1.32 mMOL/L    Calcium, Ion 2.92 (L) 4.48 - 5.28 MG/DL   Magnesium    Collection Time: 21 11:50 PM   Result Value Ref Range    Magnesium 2.1 1.8 - 2.4 mg/dl   POCT Glucose    Collection Time: 21  1:13 AM   Result Value Ref Range    POC Glucose 145 (H) 70 - 99 MG/DL   POCT Glucose    Collection Time: 21  5:49 AM   Result Value Ref Range    POC Glucose 186 (H) 70 - 99 MG/DL   CBC    Collection Time: 21  5:50 AM   Result Value Ref Range    WBC 24.6 (H) 4.0 - 10.5 K/CU MM    RBC 2.36 (L) 4.6 - 6.2 M/CU MM    Hemoglobin 7.4 (L) 13.5 - 18.0 GM/DL    Hematocrit 24.0 (L) 42 - 52 %    .7 (H) 78 - 100 FL    MCH 31.4 (H) 27 - 31 PG    MCHC 30.8 (L) 32.0 - 36.0 %    RDW 15.7 (H) 11.7 - 14.9 %    Platelets 101 654 - 142 K/CU MM    MPV 11.8 (H) 7.5 - 11.1 FL   D-Dimer, Quantitative    Collection Time: 01/25/21  5:50 AM   Result Value Ref Range    D-Dimer, Quant 681 (H) <230 NG/mL(DDU)   Fibrinogen    Collection Time: 01/25/21  5:50 AM   Result Value Ref Range    Fibrinogen 548 (H) 196.9 - 442.1 MG/DL   Procalcitonin    Collection Time: 01/25/21  5:50 AM   Result Value Ref Range    Procalcitonin 1.06    C-reactive protein    Collection Time: 01/25/21  5:50 AM   Result Value Ref Range    CRP, High Sensitivity 148.7 mg/L   Comprehensive Metabolic Panel w/ Reflex to MG    Collection Time: 01/25/21  5:50 AM   Result Value Ref Range    Sodium 129 (L) 135 - 145 MMOL/L    Potassium 4.2 3.5 - 5.1 MMOL/L    Chloride 92 (L) 99 - 110 mMol/L    CO2 17 (L) 21 - 32 MMOL/L    BUN 21 6 - 23 MG/DL    CREATININE 1.2 0.9 - 1.3 MG/DL    Glucose 138 (H) 70 - 99 MG/DL    Calcium 4.0 (LL) 8.3 - 10.6 MG/DL    Alb 1.7 (L) 3.4 - 5.0 GM/DL    Total Protein 3.7 (L) 6.4 - 8.2 GM/DL    Total Bilirubin 0.4 0.0 - 1.0 MG/DL    ALT 16 10 - 40 U/L    AST 23 15 - 37 IU/L    Alkaline Phosphatase 35 (L) 40 - 129 IU/L    GFR Non- 59 (L) >60 mL/min/1.73m2    GFR African American >60 >60 mL/min/1.73m2    Anion Gap 20 (H) 4 - 16   Calcium, Ionized    Collection Time: 01/25/21  5:50 AM   Result Value Ref Range    Ionized Ca 0.72 (L) 1.12 - 1.32 mMOL/L    Calcium, Ion 2.88 (L) 4.48 - 5.28 MG/DL   Magnesium    Collection Time: 01/25/21  5:50 AM   Result Value Ref Range    Magnesium 1.7 (L) 1.8 - 2.4 mg/dl   Phosphorus    Collection Time: 01/25/21  5:50 AM   Result Value Ref Range    Phosphorus 2.3 (L) 2.5 - 4.9 MG/DL   Triglyceride    Collection Time: 01/25/21  5:50 AM   Result Value Ref Range    Triglycerides 352 (H) <150

## 2021-01-25 NOTE — PLAN OF CARE
Problem: Airway Clearance - Ineffective  Goal: Achieve or maintain patent airway  Outcome: Ongoing     Problem: Gas Exchange - Impaired  Goal: Absence of hypoxia  Outcome: Ongoing  Goal: Promote optimal lung function  Outcome: Ongoing     Problem: Breathing Pattern - Ineffective  Goal: Ability to achieve and maintain a regular respiratory rate  Outcome: Ongoing     Problem:  Body Temperature -  Risk of, Imbalanced  Goal: Ability to maintain a body temperature within defined limits  Outcome: Ongoing  Goal: Will regain or maintain usual level of consciousness  Outcome: Ongoing  Goal: Complications related to the disease process, condition or treatment will be avoided or minimized  Outcome: Ongoing     Problem: Isolation Precautions - Risk of Spread of Infection  Goal: Prevent transmission of infection  Outcome: Ongoing     Problem: Nutrition Deficits  Goal: Optimize nutritional status  Outcome: Ongoing     Problem: Risk for Fluid Volume Deficit  Goal: Maintain normal heart rhythm  Outcome: Ongoing  Goal: Maintain absence of muscle cramping  Outcome: Ongoing  Goal: Maintain normal serum potassium, sodium, calcium, phosphorus, and pH  Outcome: Ongoing     Problem: Loneliness or Risk for Loneliness  Goal: Demonstrate positive use of time alone when socialization is not possible  Outcome: Ongoing     Problem: Fatigue  Goal: Verbalize increase energy and improved vitality  Outcome: Ongoing     Problem: Patient Education: Go to Patient Education Activity  Goal: Patient/Family Education  Outcome: Ongoing     Problem: Skin Integrity:  Goal: Will show no infection signs and symptoms  Description: Will show no infection signs and symptoms  Outcome: Ongoing  Goal: Absence of new skin breakdown  Description: Absence of new skin breakdown  Outcome: Ongoing     Problem: Falls - Risk of:  Goal: Will remain free from falls  Description: Will remain free from falls  Outcome: Ongoing  Goal: Absence of physical injury  Description: Absence of physical injury  Outcome: Ongoing     Problem: Gas Exchange - Impaired:  Goal: Levels of oxygenation will improve  Description: Levels of oxygenation will improve  Outcome: Ongoing     Problem: Fluid Volume:  Goal: Ability to achieve a balanced intake and output will improve  Description: Ability to achieve a balanced intake and output will improve  Outcome: Ongoing     Problem: Physical Regulation:  Goal: Ability to maintain clinical measurements within normal limits will improve  Description: Ability to maintain clinical measurements within normal limits will improve  Outcome: Ongoing  Goal: Will show no signs and symptoms of electrolyte imbalance  Description: Will show no signs and symptoms of electrolyte imbalance  Outcome: Ongoing  Goal: Complications related to the disease process, condition or treatment will be avoided or minimized  Description: Complications related to the disease process, condition or treatment will be avoided or minimized  Outcome: Ongoing     Problem: Fluid Volume - Imbalance:  Goal: Absence of imbalanced fluid volume signs and symptoms  Description: Absence of imbalanced fluid volume signs and symptoms  Outcome: Ongoing     Problem: Cardiac:  Goal: Ability to maintain vital signs within normal range will improve  Description: Ability to maintain vital signs within normal range will improve  Outcome: Ongoing  Goal: Cardiovascular alteration will improve  Description: Cardiovascular alteration will improve  Outcome: Ongoing     Problem: Health Behavior:  Goal: Will modify at least one risk factor affecting health status  Description: Will modify at least one risk factor affecting health status  Outcome: Ongoing  Goal: Identification of resources available to assist in meeting health care needs will improve  Description: Identification of resources available to assist in meeting health care needs will improve  Outcome: Ongoing     Problem: Discharge Planning:  Goal: Discharged to appropriate level of care  Description: Discharged to appropriate level of care  Outcome: Ongoing

## 2021-01-25 NOTE — FLOWSHEET NOTE
Dr. Yuko Servin rounding, will be speaking with family, until he puts plan with family in place he would like this nurse to take 0 off with CRRT as pt BP is dropping.

## 2021-01-25 NOTE — PROGRESS NOTES
Nephrology Progress Note  1/25/2021 11:00 AM  Subjective:      Interval History: Argentina Cohen is a 67 y.o. male  On vent and crrt and I dw son rashmi as ppt dnr cc-a and not yet ready to plan hospice      Data:   Scheduled Meds:   meropenem  1,000 mg Intravenous Q8H    insulin glargine  30 Units Subcutaneous Nightly    insulin NPH  20 Units Subcutaneous QAM    minocycline  100 mg Oral BID    methylPREDNISolone  40 mg Intravenous Q6H    heparin (porcine)  5,000 Units Subcutaneous 3 times per day    metoclopramide  10 mg Intravenous TID    albuterol sulfate HFA  4 puff Inhalation Q4H    And    ipratropium  4 puff Inhalation Q4H    insulin lispro  0-36 Units Subcutaneous Q4H    chlorhexidine  15 mL Mouth/Throat BID    famotidine (PEPCID) injection  20 mg Intravenous BID    miconazole   Topical BID    [Held by provider] apixaban  5 mg Oral BID    sodium chloride flush  10 mL Intravenous 2 times per day    sodium chloride flush  10 mL Intravenous 2 times per day    [Held by provider] atorvastatin  10 mg Oral Daily    [Held by provider] folic acid  1 mg Oral Daily    levothyroxine  75 mcg Oral Daily    [Held by provider] mesalamine  800 mg Oral TID    [Held by provider] pantoprazole  40 mg Oral QAM AC    aspirin  81 mg Oral Daily     Continuous Infusions:   dialysis builder 200 mL/hr at 01/24/21 1600    dialysis builder 1,600 mL/hr at 01/25/21 0958    prismaSATE BGK 2/0 1,600 mL/hr (01/25/21 0952)    heparin 5000 units CRRT syringe 0.8 mL/hr at 01/24/21 1924    midazolam 10 mg/hr (01/25/21 0540)    cisatracurium (NIMBEX) infusion 3 mcg/kg/min (01/25/21 0953)    norepinephrine 21 mcg/min (01/25/21 0946)    fentaNYL 200 mcg/hr (01/25/21 0643)    dextrose             CBC:   Recent Labs     01/23/21  0545 01/24/21  0607 01/25/21  0550   WBC 22.1* 20.9* 24.6*   HGB 7.7* 7.0* 7.4*    118* 161     BMP:    Recent Labs     01/24/21  0607 01/24/21  1715 01/25/21  0550   * 129* 129*   K 5.7* 5.1 4.2    94* 92*   CO2 23 23 17*   BUN 29* 34* 21   CREATININE 1.4* 1.7* 1.2   GLUCOSE 162* 196* 138*       Renal Labs  Albumin:    Lab Results   Component Value Date    LABALBU 1.7 01/25/2021     Calcium:    Lab Results   Component Value Date    CALCIUM 4.0 01/25/2021     Phosphorus:    Lab Results   Component Value Date    PHOS 2.3 01/25/2021     U/A:    Lab Results   Component Value Date    NITRU NEGATIVE 01/19/2021    COLORU ROBINA 01/19/2021    WBCUA NONE SEEN 01/19/2021    RBCUA 29 01/19/2021    MUCUS RARE 01/19/2021    TRICHOMONAS NONE SEEN 01/19/2021    BACTERIA RARE 01/19/2021    CLARITYU SLIGHTLY CLOUDY 01/19/2021    SPECGRAV 1.020 01/19/2021    UROBILINOGEN NORMAL 01/19/2021    BILIRUBINUR NEGATIVE 01/19/2021    BLOODU SMALL 01/19/2021    KETUA SMALL 01/19/2021           Objective:   I/O: 01/24 0701 - 01/25 0700  In: 1198.8 [I.V.:838.8]  Out: 3512 [Urine:4]  Vitals: /72   Pulse 123   Temp 98.2 °F (36.8 °C) (Rectal)   Resp 24   Ht 5' 11.65\" (1.82 m)   Wt 286 lb 9.6 oz (130 kg)   SpO2 100%   BMI 39.25 kg/m²   General appearance: sedated ett  HEENT: Head: Normal, normocephalic, atraumatic.   Neck: supple, symmetrical, trachea midline  Lungs: diminished breath sounds bilaterally  Heart: S1, S2 normal  Abdomen: abnormal findings:  soft nt  Extremities: edema trace  Neurologic: Mental status: alertness: sedated        Assessment and Plan:      IMP:  arf from atn on ckd 3  Met acidosis  Hyperkalemia  resp failure with covid  Hypotension  Dm2  Hyponatremia    Plan     1 on crrt and try uf 25-50cc/hr  2 maintain dialysis with crrt and bicarb gtt for combined acidosis  3 K stable mointor na  4 on vent and sedation monitor   5 wean pressor and watch as uf  6 ssi  7 maintain abx therapy  Will follow   Is dnr cc-a  I dw son and maintain for now  Replete electrolyte  Treat thyroid         Karle Ee, MD

## 2021-01-26 NOTE — PROGRESS NOTES
Comprehensive Nutrition Assessment    Type and Reason for Visit:  Reassess    Nutrition Recommendations/Plan:   Recommend NPO at this time d/t pt not hemodynamically stable at this time  Restart EN when medically appropriate if aggressive nutrition therapy desired  Will continue to monitor POC    Nutrition Assessment:  pt remains intubated, on multiple pressors, no EN at this time, noted DNR-CC, pt remains at high nutrition risk    Malnutrition Assessment:  Malnutrition Status:  Insufficient data    Context:  Acute Illness       Estimated Daily Nutrient Needs:  Energy (kcal):  1299-8088(hypocaloric feeds 11-14 kcal/kg);  Weight Used for Energy Requirements:  Current     Protein (g):  120-162; Weight Used for Protein Requirements:  Ideal       Nutrition Related Findings:  Labs: Na 132, BUN 36, Cr 1.8, Mag 2.5, Phos 9.0, Glucose 199, WBC 39.6      Wounds:  None       Current Nutrition Therapies:    No diet orders on file    Anthropometric Measures:  · Height: 5' 11.65\" (182 cm)  · Current Body Weight: 286 lb 9.6 oz (130 kg)(unknown weight source)   · Admission Body Weight: 341 lb 14.9 oz (155.1 kg)(unknown weight source)    · Usual Body Weight: (n/a)     · Ideal Body Weight: 176 lbs; % Ideal Body Weight 162.8 %   · BMI: 39.2  · BMI Categories: Obese Class 3 (BMI 40.0 or greater)       Nutrition Diagnosis:   · Inadequate oral intake related to impaired respiratory function as evidenced by NPO or clear liquid status due to medical condition    Nutrition Interventions:   Food and/or Nutrient Delivery:  Continue NPO  Nutrition Education/Counseling:  No recommendation at this time   Coordination of Nutrition Care:  Continue to monitor while inpatient    Goals:  Pt will have nutrition source initiated when medically appropriate if aggressive nutrition therapy desired     Nutrition Monitoring and Evaluation:   Behavioral-Environmental Outcomes:  None Identified   Food/Nutrient Intake Outcomes:  Diet Advancement/Tolerance,

## 2021-01-26 NOTE — FLOWSHEET NOTE
Per Dr. Ramirez and Palliative care families wishes is a compassionate wean. Pt is still on the vent at this time. Ok to dc all bp medications and monitor pt for the next step in the process to compassionately extubate. All pressors dc'd at this time.

## 2021-01-26 NOTE — DISCHARGE SUMMARY
[address] Hospitalist     Discharge Summary    Name:  Dean Wright /Age/Sex: 1945  (67 y.o. male)   MRN & CSN:  [id number] & [id number] Admission Date/Time: 2021  4:54 PM   Attending:  Neto Hernandez MD Discharging Physician: Neto Hernandez MD     HPI:     Please, see admission HPI in [address] and patient's hospital course below    Hospital Course:   Dean Wright is a 67 y.o.  male  who presents with COVID-19       Reportedly, patient was pronounced dead at 16:23 on 21 after compassionate extubation     The following assessments below reflect the patient's hospital course    Acute respiratory failure with hypoxia secondary to COVID-19 viral pneumonia infection, concern for PE    Oxygen requirement : ventilated  still requiring high vent settings  IV antibiotics, IV steroids initiated on 2021   Convalescent plasma given: 2021    Remdesivir: 21  Septic shock due to viral pneumonia, possible bacterial superinfection    Septic shock likely from COVID-19 positive: 2021    Requiring 3 to 4 pressors and still hypotensive   Ultrasound DVT negative, DC heparin infusion, continue prophylactic  Pulmonology on board  ID, dc vanc and cefepime, switch to leonel and minocycline , On Solu-Medrol    A. Fib, rate controlled    Cardiology was consulted for possible junctional bradycardia, assessed as slow Afib  Cardiology on board     ANASTASIA, likely prerenal, worsening  Metabolic acidosis  Hyperkalemia    Avoid nephrotoxics , nephrology,  CRRT, bicarb     Acute blood loss anemia, multifactorial,     underlying illness, CRRT  Transfuse for hb <7, monitor     Diabetes mellitus type 2,     poor control, sliding scale, Lantus, hypoglycemic protocol, endocrine on board      Nutrition - On tube feeds, dietitian on board    Chronic medical conditions:     HFrEF , not in exacerbation  Morbid obesity with BMI of 47, encourage weight loss  HTN, medications on hold due to hypotension  HLD, fenofibrate  Chronic anxiety disorder, valium  Hypothyroidism, synthroid     Patient  on 2021    Consults this admission:  IP CONSULT TO PHARMACY  IP CONSULT TO ENDOCRINOLOGY  IP CONSULT TO CARDIOLOGY  IP CONSULT TO PULMONOLOGY  IP CONSULT TO IV TEAM  IP CONSULT TO DIETITIAN  IP CONSULT TO DIETITIAN  IP CONSULT TO IV TEAM  IP CONSULT TO NEPHROLOGY  IP CONSULT TO INFECTIOUS DISEASES  IP CONSULT TO INTERVENTIONAL RADIOLOGY  IP CONSULT TO [address] [place] [address] Avenue TO [address] [place] [address]    Discharge Instruction:     Condition on discharge:     Discharge Medications:      Albino, Holli [facility] Medication Instructions EP    Printed on:21 1631   Medication Information                      amLODIPine (NORVASC) 10 MG tablet  Take 10 mg by mouth daily             atorvastatin (LIPITOR) 10 MG tablet  Take 10 mg by mouth daily             chlorthalidone (HYGROTON) 25 MG tablet  Take 25 mg by mouth daily             diazePAM (VALIUM) 5 MG tablet  Take 5 mg by mouth 3 times daily as needed for Anxiety.             ezetimibe (ZETIA) 10 MG tablet  Take 10 mg by mouth daily             fenofibrate micronized (LOFIBRA) 200 MG capsule  Take 200 mg by mouth every morning (before breakfast)             folic acid (FOLVITE) 1 MG tablet  Take 1 mg by mouth daily             glipiZIDE (GLUCOTROL XL) 10 MG extended release tablet  Take 10 mg by mouth daily             HYDROcodone-acetaminophen (NORCO) 5-325 MG per tablet  Take 1 tablet by mouth every 6 hours as needed for Pain.              Insulin Glargine, 2 Unit Dial, (TOUJEO MAX SOLOSTAR) 300 UNIT/ML SOPN  Inject 70 Units into the skin daily             levothyroxine (SYNTHROID) 75 MCG tablet  Take 75 mcg by mouth Daily             mesalamine (LIALDA) 1.2 g EC tablet  Take 2,400 mg by mouth daily (with breakfast)             olmesartan (BENICAR) 40 MG tablet  Take 40 mg by mouth daily             omeprazole (PRILOSEC) 20 MG delayed release capsule  Take 20 mg by mouth daily

## 2021-01-26 NOTE — FLOWSHEET NOTE
Demond Sauecda here to assist with airleak after [facility] rotated the tube per her direction.

## 2021-01-26 NOTE — PROGRESS NOTES
Infectious Disease Progress Note  2021   Patient Name: Dayan Roblero : 1945       Reason for visit: F/u COVID-19 pneumonia, acute respiratory failure? History:? Interval history noted  Intubated, sedated and on mechanical ventilation  Physical Exam:  Vital Signs: BP (!) 80/50   Pulse 120   Temp 98.2 °F (36.8 °C) (Rectal)   Resp 24   Ht 5' 11.65\" (1.82 m)   Wt 286 lb 9.6 oz (130 kg)   SpO2 (!) 85%   BMI 39.25 kg/m²     Gen: intubated and sedated  Skin: no stigmata of endocarditis  Wounds: C/D/I  HEMT: AT/NC ETT  Eyes: PERRLA. Neck: Supple. Trachea midline. No LAD. Chest:  transmitted breath sounds. Heart:   Abd: soft, non-distended, no tenderness, no hepatomegaly. Normoactive bowel sounds. Ext: no clubbing, cyanosis, or edema  Catheter Site: without erythema or tenderness  LDA: right arm PICC line, Urethral catheter:  Neuro: sedated and on the ventilator. Radiologic / Imaging / TESTING    Chest x-ray 2021   impression:  Endotracheal tube placement now lies 6.6 cm above the jalil. Similar appearing parenchymal infiltrates seen diffusely, with possible trace   right pleural effusion at the costophrenic angle.         Labs:    Recent Results (from the past 24 hour(s))   POCT Glucose    Collection Time: 21 12:30 PM   Result Value Ref Range    POC Glucose 120 (H) 70 - 99 MG/DL   Calcium, Ionized    Collection Time: 21 12:49 PM   Result Value Ref Range    Ionized Ca 0.68 (L) 1.12 - 1.32 mMOL/L    Calcium, Ion 2.72 (L) 4.48 - 5.28 MG/DL   Magnesium    Collection Time: 21 12:49 PM   Result Value Ref Range    Magnesium 2.3 1.8 - 2.4 mg/dl   POCT Glucose    Collection Time: 21  4:33 PM   Result Value Ref Range    POC Glucose 164 (H) 70 - 99 MG/DL   Comprehensive Metabolic Panel w/ Reflex to MG    Collection Time: 21  6:00 PM   Result Value Ref Range    Sodium 134 (L) 135 - 145 MMOL/L    Potassium 4.6 3.5 - 5.1 MMOL/L    Chloride 95 (L) 99 - 110 mMol/L    CO2 Hemoglobin 8.1 (L) 13.5 - 18.0 GM/DL    Hematocrit 27.4 (L) 42 - 52 %    .6 (H) 78 - 100 FL    MCH 30.9 27 - 31 PG    MCHC 29.6 (L) 32.0 - 36.0 %    RDW 15.6 (H) 11.7 - 14.9 %    Platelets 328 294 - 913 K/CU MM    MPV 11.9 (H) 7.5 - 11.1 FL   D-Dimer, Quantitative    Collection Time: 01/26/21  6:35 AM   Result Value Ref Range    D-Dimer, Quant 1004 (H) <230 NG/mL(DDU)   Fibrinogen    Collection Time: 01/26/21  6:35 AM   Result Value Ref Range    Fibrinogen 576 (H) 196.9 - 442.1 MG/DL   C-reactive protein    Collection Time: 01/26/21  6:35 AM   Result Value Ref Range    CRP, High Sensitivity 104.6 mg/L   Comprehensive Metabolic Panel w/ Reflex to MG    Collection Time: 01/26/21  6:35 AM   Result Value Ref Range    Sodium 132 (L) 135 - 145 MMOL/L    Potassium 4.7 3.5 - 5.1 MMOL/L    Chloride 91 (L) 99 - 110 mMol/L    CO2 20 (L) 21 - 32 MMOL/L    BUN 36 (H) 6 - 23 MG/DL    CREATININE 1.8 (H) 0.9 - 1.3 MG/DL    Glucose 199 (H) 70 - 99 MG/DL    Calcium 4.7 (LL) 8.3 - 10.6 MG/DL    Alb 2.6 (L) 3.4 - 5.0 GM/DL    Total Protein 5.6 (L) 6.4 - 8.2 GM/DL    Total Bilirubin 1.2 (H) 0.0 - 1.0 MG/DL     (H) 10 - 40 U/L    AST 1,882 (H) 15 - 37 IU/L    Alkaline Phosphatase 55 40 - 128 IU/L    GFR Non- 37 (L) >60 mL/min/1.73m2    GFR  45 (L) >60 mL/min/1.73m2    Anion Gap 21 (H) 4 - 16   Calcium, Ionized    Collection Time: 01/26/21  6:35 AM   Result Value Ref Range    Ionized Ca 0.61 (L) 1.12 - 1.32 mMOL/L    Calcium, Ion 2.44 (L) 4.48 - 5.28 MG/DL   Magnesium    Collection Time: 01/26/21  6:35 AM   Result Value Ref Range    Magnesium 2.5 (H) 1.8 - 2.4 mg/dl   Phosphorus    Collection Time: 01/26/21  6:35 AM   Result Value Ref Range    Phosphorus 9.0 (H) 2.5 - 4.9 MG/DL   POCT Glucose    Collection Time: 01/26/21  6:50 AM   Result Value Ref Range    POC Glucose 189 (H) 70 - 99 MG/DL   POCT Glucose    Collection Time: 01/26/21  8:50 AM   Result Value Ref Range    POC Glucose 234 (H) 70 - 99 MG/DL     CULTURE results: Invalid input(s): BLOOD CULTURE,  URINE CULTURE, SURGICAL CULTURE    Diagnosis:  Patient Active Problem List   Diagnosis    COVID-19    Pneumonia due to severe acute respiratory syndrome coronavirus 2 (SARS-CoV-2)    Acute respiratory failure with hypoxia (HCC)    ANASTASIA (acute kidney injury) ([facility]       Active Problems  Principal Problem:    COVID-19  Active Problems:    Pneumonia due to severe acute respiratory syndrome coronavirus 2 (SARS-CoV-2)    Acute respiratory failure with hypoxia (HCC)    ANASTASIA (acute kidney injury) ([facility]  Resolved Problems:    * No resolved hospital problems. *      Impression and plan   Summary and rationale: Patient is a 67 y.o.  male T2DM,CAD, HLD, HTN, AMINA on CPAP, AAA, BMI 46 kg/m2 who was admitted 1/8/2021 for further evaluation and management of SOB that started on 1/6/2021. He had associated, fatigue, myalgia, cough. Tested positive for SARS-CoV-2 by NAAT on 1/8/2021. Rapidly worsened and was intubated on 1/13/2021. Critical COVID-19 pneumonia, respiratory failure, ANASTASIA, elevated bilirubin, COVID-associated coagulopathy, hepatitis   Clinical status: remains critical, on vasopressors, leukocytosis worsening, however CRP has reduced. Liver enzymes markedly elevated. Perhaps a reduction in CRP may be due to the reduced synthesis of CRP.    Therapeutic:  o Ongoing antibiotics:continue meropenem and  Minocycline 1/22-  o Antiviral agent: remdesivir 1/9-13  o Anti-inflammatory agents:  - Dexamethasone:1/8-19  - Solu-Medrol:1/19-  o Completed antibiotics: vancomycin 1/11-22, cefepime 1/11-22  o Convalescent plasma receipt:  o Other agents:    Diagnostic: repeat respiratory cx   F/u:BDG, Aspergillus Ag,   Other:      Electronically signed by: Electronically signed by Lydia Gee MD on 1/26/2021 at 10:30 AM

## 2021-01-26 NOTE — PROGRESS NOTES
Son Iain Dowd called and notified that pressors have been turned off. Informed him that staff will keep him updated.

## 2021-01-26 NOTE — PROGRESS NOTES
Labs     01/24/21  0607 01/25/21  0550 01/26/21  0635   WBC 20.9* 24.6* 39.6*   HGB 7.0* 7.4* 8.1*   * 161 202     BMP:    Recent Labs     01/24/21  1715 01/25/21  0550 01/25/21  1800   * 138 134*   K 5.1 4.6 4.6   CL 94* 98* 95*   CO2 23 19* 25   BUN 34* 29* 36*   CREATININE 1.7* 1.4* 1.6*   GLUCOSE 196* 157* 197*       Renal Labs  Albumin:    Lab Results   Component Value Date    LABALBU 2.6 01/25/2021     Calcium:    Lab Results   Component Value Date    CALCIUM 4.7 01/25/2021     Phosphorus:    Lab Results   Component Value Date    PHOS 3.9 01/25/2021     U/A:    Lab Results   Component Value Date    NITRU NEGATIVE 01/19/2021    COLORU ROBINA 01/19/2021    WBCUA NONE SEEN 01/19/2021    RBCUA 29 01/19/2021    MUCUS RARE 01/19/2021    TRICHOMONAS NONE SEEN 01/19/2021    BACTERIA RARE 01/19/2021    CLARITYU SLIGHTLY CLOUDY 01/19/2021    SPECGRAV 1.020 01/19/2021    UROBILINOGEN NORMAL 01/19/2021    BILIRUBINUR NEGATIVE 01/19/2021    BLOODU SMALL 01/19/2021    KETUA SMALL 01/19/2021           Objective:   I/O: 01/25 0701 - 01/26 0700  In: 528 [P.O.:29; I.V.:484]  Out: 4410 [Urine:10]  Vitals: BP (!) 82/52   Pulse 120   Temp 99 °F (37.2 °C) (Rectal)   Resp 24   Ht 5' 11.65\" (1.82 m)   Wt 286 lb 9.6 oz (130 kg)   SpO2 98%   BMI 39.25 kg/m²   General appearance: sedated ett  HEENT: Head: Normal, normocephalic, atraumatic.   Neck: supple, symmetrical, trachea midline  Lungs: diminished breath sounds bilaterally  Heart: S1, S2 normal  Abdomen: abnormal findings:  soft nt  Extremities: edema trace  Neurologic: Mental status: alertness: sedated        Assessment and Plan:      IMP:  arf from atn on ckd 3  Met acidosis  Hyperkalemia  resp failure with covid  Hypotension  Dm2  Hyponatremia    Plan     1 stopped crrt as not tolerate  2 stop sedation and monitor neuro state  3 from covid overall multiorgan failure  4 family to come at 9am   Pt is dnr cc-a and rec dw family to change to comfort care as overall option limited and quickly declining.,  Will follow up and dw his nurse           Maynor Lantigua MD

## 2021-01-26 NOTE — FLOWSHEET NOTE
B/p 71/49/59, HR dropping into the 110's. Noted generalized mottling during the bath. Patient experiencing a big airleak of ett. Losing small amount of air from tital volume. Reaching 400's from 600's. Titrating up levophed gtt.

## 2021-01-26 NOTE — PROGRESS NOTES
pulmonary      SUBJECTIVE: intubated on vent     OBJECTIVE    VITALS:  BP (!) 65/56   Pulse 106   Temp 99 °F (37.2 °C) (Rectal)   Resp 24   Ht 5' 11.65\" (1.82 m)   Wt 286 lb 9.6 oz (130 kg)   SpO2 96%   BMI 39.25 kg/m²   HEAD AND FACE EXAM:  No throat injection, no active exudate,no thrush  NECK EXAM;No JVD, no masses, symmetrical  CHEST EXAM; Expansion equal and symmetrical, no masses  LUNG EXAM; Good breath sounds bilaterally. There are expiratory wheezes both lungs, there are crackles at both lung bases  CARDIOVASCULAR EXAM: Positive S1 and S2, no S3 or S4, no clicks ,no murmurs  RIGHT AND LEFT LOWER EXTRIMITY EXAM: No edema, no swelling, no inflamation            LABS   Lab Results   Component Value Date    WBC 39.6 (HH) 01/26/2021    HGB 8.1 (L) 01/26/2021    HCT 27.4 (L) 01/26/2021    .6 (H) 01/26/2021     01/26/2021     Lab Results   Component Value Date    CREATININE 1.6 (H) 01/25/2021    BUN 36 (H) 01/25/2021     (L) 01/25/2021    K 4.6 01/25/2021    CL 95 (L) 01/25/2021    CO2 25 01/25/2021     Lab Results   Component Value Date    INR 1.02 01/08/2021    PROTIME 13.3 01/08/2021          Lab Results   Component Value Date    PHOS 3.9 01/25/2021    PHOS 3.1 01/25/2021    PHOS 3.8 01/24/2021        Recent Labs     01/24/21  0600 01/24/21  1345 01/25/21  0700   PH 7.16* 7.26* 7.20*   PO2ART 33* 59* 84   [id number] 78.0* 56.0* 65.0*   O2SAT 58.9* 88.9* 94.2*         Wt Readings from Last 3 Encounters:   01/25/21 286 lb 9.6 oz (130 kg)        1. Endotracheal tube is not identified.  Other lines and tubes are stable. 2. Interstitial edema appears slightly improved. 3. Persistent basilar opacities with small effusions.                   ASSESMENT  Ac resp failure  covid pneumonia  abraham pneumonia        PLAN  1. cpm  2. Cont full vent support  3.  Prognosis is grave    1/26/2021  Stuart Jacobs M.D.

## 2021-01-26 NOTE — PLAN OF CARE
Problem: Airway Clearance - Ineffective  Goal: Achieve or maintain patent airway  1/25/2021 2024 by Aaron Whatley RN  Outcome: Ongoing  1/25/2021 1008 by Natan Phillip RN  Outcome: Ongoing

## 2021-01-26 NOTE — PROGRESS NOTES
Time of death 200, son notified at 64, gave the name of  home [place] , son was unable to give spelling of name, will call and verify.

## 2021-01-26 NOTE — CONSULTS
[facility] Palliative Medicine Consultation      Mildred Conway  1945  [id number]  Primary Care:  Gifty Keane MD  Referring physician:  David Capps MD   Date of Admission:  1/8/2021  Date of Consultation:  1/26/2021    Reason for Consult:    _x___ Advance Care Planning  _x___Transition of Care Planning  _x__ Psychosocial Support  _x___ Symptom Management    Informant: Records are reviewed, history obtained from nurse. There are no family here. Chief Complaint: shortness of breath    History of Present Illness: Mildred Conway is a 67 y.o. male, who was admitted on 1./9/21 with shortness of breath. He was having generalize weakness, body aches, fever and cough. He was found to have COVID-19. He became hypoxic and was started on vapotherm however oxygen worsened and he was eventually intubated on 1/14/20. He received steroids, remdesivir and plasma. 1/19/21 nephrology was consulted and dialysis was started but unfortunately was unable to tolerate crrt and it was stopped. Dr. Angelo Jacobs discussed with family who agree to comfort care only. Past Medical History:   Diagnosis Date    Abdominal aortic aneurysm (AAA) without rupture (HCC)     Anxiety     Diabetes mellitus [facility]     Edema     Hyperlipidemia     MI (myocardial infarction) ([facility] 1992    Dr. Cally Cooper    AMINA on CPAP     Ulcerative colitis ([facility]        Past Surgical History   has a past surgical history that includes Coronary artery bypass graft (1995); Tonsillectomy; and IR NONTUNNELED VASCULAR CATHETER > 5 YEARS (1/20/2021). Social History:   Social History     Socioeconomic History    Marital status:       Spouse name: None    Number of children: None    Years of education: None    Highest education level: None   Occupational History    None   Social Needs    Financial resource strain: None    Food insecurity     Worry: None     Inability: None    Transportation needs     Medical: None     Non-medical: None

## 2021-01-26 NOTE — FLOWSHEET NOTE
hospitalist called for orders for decreased b/p.  Decreased fluid removal rate on CRRT to 10 from 220

## 2021-01-26 NOTE — FLOWSHEET NOTE
Dr. Bao Amaya called per perfect serve and informed of patient situation and family wishes.  Orders obtained for 25% Albumin and an epi gtt

## 2021-01-27 LAB
1,3 BETA-D-GLUCAN INTERP: NEGATIVE
1,3 BETA-D-GLUCAN: 52 PG/ML
ASPERGILLUS GALACTO AG: NEGATIVE
ASPERGILLUS GALACTO INDEX: 0.05
